# Patient Record
Sex: MALE | Race: WHITE | NOT HISPANIC OR LATINO | Employment: OTHER | URBAN - METROPOLITAN AREA
[De-identification: names, ages, dates, MRNs, and addresses within clinical notes are randomized per-mention and may not be internally consistent; named-entity substitution may affect disease eponyms.]

---

## 2023-03-30 LAB
ALANINE AMINOTRANSFERASE (SGPT) (U/L) IN SER/PLAS: 12 U/L (ref 10–52)
ALBUMIN (G/DL) IN SER/PLAS: 4.1 G/DL (ref 3.4–5)
ALKALINE PHOSPHATASE (U/L) IN SER/PLAS: 25 U/L (ref 33–136)
ANION GAP IN SER/PLAS: 12 MMOL/L (ref 10–20)
ASPARTATE AMINOTRANSFERASE (SGOT) (U/L) IN SER/PLAS: 17 U/L (ref 9–39)
BASOPHILS (10*3/UL) IN BLOOD BY AUTOMATED COUNT: 0.05 X10E9/L (ref 0–0.1)
BASOPHILS/100 LEUKOCYTES IN BLOOD BY AUTOMATED COUNT: 0.8 % (ref 0–2)
BILIRUBIN TOTAL (MG/DL) IN SER/PLAS: 0.6 MG/DL (ref 0–1.2)
CALCIDIOL (25 OH VITAMIN D3) (NG/ML) IN SER/PLAS: 64 NG/ML
CALCIUM (MG/DL) IN SER/PLAS: 9.5 MG/DL (ref 8.6–10.6)
CARBON DIOXIDE, TOTAL (MMOL/L) IN SER/PLAS: 27 MMOL/L (ref 21–32)
CHLORIDE (MMOL/L) IN SER/PLAS: 106 MMOL/L (ref 98–107)
CHOLESTEROL (MG/DL) IN SER/PLAS: 164 MG/DL (ref 0–199)
CHOLESTEROL IN HDL (MG/DL) IN SER/PLAS: 59.3 MG/DL
CHOLESTEROL/HDL RATIO: 2.8
COBALAMIN (VITAMIN B12) (PG/ML) IN SER/PLAS: 187 PG/ML (ref 211–911)
CREATININE (MG/DL) IN SER/PLAS: 1.48 MG/DL (ref 0.5–1.3)
EOSINOPHILS (10*3/UL) IN BLOOD BY AUTOMATED COUNT: 0.46 X10E9/L (ref 0–0.4)
EOSINOPHILS/100 LEUKOCYTES IN BLOOD BY AUTOMATED COUNT: 7.8 % (ref 0–6)
ERYTHROCYTE DISTRIBUTION WIDTH (RATIO) BY AUTOMATED COUNT: 13.2 % (ref 11.5–14.5)
ERYTHROCYTE MEAN CORPUSCULAR HEMOGLOBIN CONCENTRATION (G/DL) BY AUTOMATED: 32.8 G/DL (ref 32–36)
ERYTHROCYTE MEAN CORPUSCULAR VOLUME (FL) BY AUTOMATED COUNT: 95 FL (ref 80–100)
ERYTHROCYTES (10*6/UL) IN BLOOD BY AUTOMATED COUNT: 3.69 X10E12/L (ref 4.5–5.9)
GFR MALE: 44 ML/MIN/1.73M2
GLUCOSE (MG/DL) IN SER/PLAS: 94 MG/DL (ref 74–99)
HEMATOCRIT (%) IN BLOOD BY AUTOMATED COUNT: 35.1 % (ref 41–52)
HEMOGLOBIN (G/DL) IN BLOOD: 11.5 G/DL (ref 13.5–17.5)
IMMATURE GRANULOCYTES/100 LEUKOCYTES IN BLOOD BY AUTOMATED COUNT: 0.7 % (ref 0–0.9)
LDL: 91 MG/DL (ref 0–99)
LEUKOCYTES (10*3/UL) IN BLOOD BY AUTOMATED COUNT: 5.9 X10E9/L (ref 4.4–11.3)
LYMPHOCYTES (10*3/UL) IN BLOOD BY AUTOMATED COUNT: 2.32 X10E9/L (ref 0.8–3)
LYMPHOCYTES/100 LEUKOCYTES IN BLOOD BY AUTOMATED COUNT: 39.1 % (ref 13–44)
MONOCYTES (10*3/UL) IN BLOOD BY AUTOMATED COUNT: 0.42 X10E9/L (ref 0.05–0.8)
MONOCYTES/100 LEUKOCYTES IN BLOOD BY AUTOMATED COUNT: 7.1 % (ref 2–10)
NEUTROPHILS (10*3/UL) IN BLOOD BY AUTOMATED COUNT: 2.64 X10E9/L (ref 1.6–5.5)
NEUTROPHILS/100 LEUKOCYTES IN BLOOD BY AUTOMATED COUNT: 44.5 % (ref 40–80)
NRBC (PER 100 WBCS) BY AUTOMATED COUNT: 0 /100 WBC (ref 0–0)
PLATELETS (10*3/UL) IN BLOOD AUTOMATED COUNT: 213 X10E9/L (ref 150–450)
POTASSIUM (MMOL/L) IN SER/PLAS: 5.3 MMOL/L (ref 3.5–5.3)
PROTEIN TOTAL: 7.1 G/DL (ref 6.4–8.2)
SODIUM (MMOL/L) IN SER/PLAS: 140 MMOL/L (ref 136–145)
TRIGLYCERIDE (MG/DL) IN SER/PLAS: 67 MG/DL (ref 0–149)
UREA NITROGEN (MG/DL) IN SER/PLAS: 26 MG/DL (ref 6–23)
VLDL: 13 MG/DL (ref 0–40)

## 2023-11-01 DIAGNOSIS — F41.9 ANXIETY: Primary | ICD-10-CM

## 2023-11-01 RX ORDER — SERTRALINE HYDROCHLORIDE 50 MG/1
50 TABLET, FILM COATED ORAL NIGHTLY
Qty: 30 TABLET | Refills: 1 | Status: SHIPPED | OUTPATIENT
Start: 2023-11-01 | End: 2023-12-06

## 2023-11-04 PROBLEM — F32.A DEPRESSION: Status: ACTIVE | Noted: 2023-11-04

## 2023-11-04 PROBLEM — M54.9 BACK PAIN, CHRONIC: Status: ACTIVE | Noted: 2023-11-04

## 2023-11-04 PROBLEM — I35.8 HEART MURMUR, AORTIC: Status: ACTIVE | Noted: 2023-11-04

## 2023-11-04 PROBLEM — G89.29 BACK PAIN, CHRONIC: Status: ACTIVE | Noted: 2023-11-04

## 2023-11-04 PROBLEM — G62.9 PERIPHERAL NEUROPATHY: Status: ACTIVE | Noted: 2023-11-04

## 2023-11-04 PROBLEM — R94.4 ABNORMAL RENAL FUNCTION TEST: Status: ACTIVE | Noted: 2023-11-04

## 2023-11-04 PROBLEM — K59.09 CHRONIC CONSTIPATION: Status: ACTIVE | Noted: 2023-11-04

## 2023-11-04 PROBLEM — I35.1 AORTIC VALVE REGURGITATION, NONRHEUMATIC: Status: ACTIVE | Noted: 2023-11-04

## 2023-11-04 PROBLEM — E78.5 HYPERLIPIDEMIA: Status: ACTIVE | Noted: 2023-11-04

## 2023-11-04 PROBLEM — E87.6 HYPOKALEMIA: Status: ACTIVE | Noted: 2023-11-04

## 2023-11-04 PROBLEM — I35.0 AORTIC STENOSIS: Status: ACTIVE | Noted: 2023-11-04

## 2023-11-04 PROBLEM — I10 HYPERTENSION: Status: ACTIVE | Noted: 2023-11-04

## 2023-11-04 PROBLEM — N45.1 EPIDIDYMITIS, RIGHT: Status: ACTIVE | Noted: 2023-11-04

## 2023-11-04 PROBLEM — C46.9: Status: ACTIVE | Noted: 2023-11-04

## 2023-11-04 PROBLEM — N28.1 SIMPLE RENAL CYST: Status: ACTIVE | Noted: 2023-11-04

## 2023-11-04 PROBLEM — R53.83 CAREGIVER WITH FATIGUE: Status: ACTIVE | Noted: 2023-11-04

## 2023-11-04 PROBLEM — D64.9 ANEMIA: Status: ACTIVE | Noted: 2023-11-04

## 2023-11-04 PROBLEM — R73.09 ABNORMAL GLUCOSE: Status: ACTIVE | Noted: 2023-11-04

## 2023-11-04 PROBLEM — N18.30 CHRONIC KIDNEY DISEASE (CKD), STAGE III (MODERATE) (MULTI): Status: ACTIVE | Noted: 2023-11-04

## 2023-11-04 PROBLEM — R79.89 ABNORMAL TSH: Status: ACTIVE | Noted: 2023-11-04

## 2023-11-04 PROBLEM — B37.2 MONILIASIS SKIN: Status: ACTIVE | Noted: 2023-11-04

## 2023-11-04 PROBLEM — N52.9 ERECTILE DYSFUNCTION: Status: ACTIVE | Noted: 2023-11-04

## 2023-11-04 PROBLEM — R79.89 LOW VITAMIN D LEVEL: Status: ACTIVE | Noted: 2023-11-04

## 2023-11-04 PROBLEM — M48.061 SPINAL STENOSIS, LUMBAR: Status: ACTIVE | Noted: 2023-11-04

## 2023-11-04 RX ORDER — LISINOPRIL 20 MG/1
1 TABLET ORAL EVERY MORNING
COMMUNITY
Start: 2023-08-06

## 2023-11-04 RX ORDER — POTASSIUM CHLORIDE 1500 MG/1
1 TABLET, EXTENDED RELEASE ORAL DAILY
COMMUNITY
End: 2024-03-12

## 2023-11-04 RX ORDER — METOPROLOL SUCCINATE 50 MG/1
1 TABLET, EXTENDED RELEASE ORAL DAILY
COMMUNITY
End: 2023-11-10

## 2023-11-04 RX ORDER — NYSTATIN 100000 U/G
1 CREAM TOPICAL EVERY 12 HOURS
COMMUNITY
End: 2023-11-06 | Stop reason: ALTCHOICE

## 2023-11-04 RX ORDER — NORTRIPTYLINE HYDROCHLORIDE 10 MG/1
1 CAPSULE ORAL NIGHTLY
COMMUNITY
End: 2024-01-02

## 2023-11-04 RX ORDER — QUINAPRIL 20 MG/1
1 TABLET ORAL DAILY
COMMUNITY
Start: 2013-12-09 | End: 2023-11-06 | Stop reason: ALTCHOICE

## 2023-11-04 RX ORDER — CHOLECALCIFEROL (VITAMIN D3) 125 MCG
1 CAPSULE ORAL DAILY
COMMUNITY
Start: 2017-06-08

## 2023-11-04 RX ORDER — SIMVASTATIN 20 MG/1
1 TABLET, FILM COATED ORAL NIGHTLY
COMMUNITY
End: 2023-12-14

## 2023-11-04 RX ORDER — AMLODIPINE BESYLATE 10 MG/1
1 TABLET ORAL DAILY
COMMUNITY

## 2023-11-04 RX ORDER — ASPIRIN 81 MG/1
1 TABLET ORAL DAILY
COMMUNITY
Start: 2017-02-06

## 2023-11-06 ENCOUNTER — OFFICE VISIT (OUTPATIENT)
Dept: PRIMARY CARE | Facility: CLINIC | Age: 88
End: 2023-11-06
Payer: MEDICARE

## 2023-11-06 VITALS
HEART RATE: 67 BPM | BODY MASS INDEX: 26.91 KG/M2 | HEIGHT: 64 IN | WEIGHT: 157.63 LBS | DIASTOLIC BLOOD PRESSURE: 54 MMHG | SYSTOLIC BLOOD PRESSURE: 121 MMHG | OXYGEN SATURATION: 99 % | RESPIRATION RATE: 16 BRPM

## 2023-11-06 DIAGNOSIS — Z00.00 MEDICARE ANNUAL WELLNESS VISIT, SUBSEQUENT: Primary | ICD-10-CM

## 2023-11-06 DIAGNOSIS — Z23 NEED FOR IMMUNIZATION AGAINST INFLUENZA: ICD-10-CM

## 2023-11-06 DIAGNOSIS — Z23 NEED FOR VACCINATION: ICD-10-CM

## 2023-11-06 DIAGNOSIS — E78.00 PURE HYPERCHOLESTEROLEMIA: ICD-10-CM

## 2023-11-06 DIAGNOSIS — I10 PRIMARY HYPERTENSION: ICD-10-CM

## 2023-11-06 DIAGNOSIS — E53.8 VITAMIN B12 DEFICIENCY: ICD-10-CM

## 2023-11-06 PROBLEM — I70.91 GENERALIZED ATHEROSCLEROSIS: Status: ACTIVE | Noted: 2023-08-21

## 2023-11-06 PROBLEM — R53.83 CAREGIVER WITH FATIGUE: Status: RESOLVED | Noted: 2023-11-04 | Resolved: 2023-11-06

## 2023-11-06 PROBLEM — R73.09 ABNORMAL GLUCOSE: Status: RESOLVED | Noted: 2023-11-04 | Resolved: 2023-11-06

## 2023-11-06 PROBLEM — R79.89 ABNORMAL TSH: Status: RESOLVED | Noted: 2023-11-04 | Resolved: 2023-11-06

## 2023-11-06 PROBLEM — M54.9 BACK PAIN, CHRONIC: Status: RESOLVED | Noted: 2023-11-04 | Resolved: 2023-11-06

## 2023-11-06 PROBLEM — R60.0 EDEMA OF BOTH LOWER LEGS: Status: RESOLVED | Noted: 2023-08-21 | Resolved: 2023-11-06

## 2023-11-06 PROBLEM — G89.29 BACK PAIN, CHRONIC: Status: RESOLVED | Noted: 2023-11-04 | Resolved: 2023-11-06

## 2023-11-06 PROBLEM — K59.09 CHRONIC CONSTIPATION: Status: RESOLVED | Noted: 2023-11-04 | Resolved: 2023-11-06

## 2023-11-06 PROBLEM — R94.4 ABNORMAL RENAL FUNCTION TEST: Status: RESOLVED | Noted: 2023-11-04 | Resolved: 2023-11-06

## 2023-11-06 PROBLEM — B37.2 MONILIASIS SKIN: Status: RESOLVED | Noted: 2023-11-04 | Resolved: 2023-11-06

## 2023-11-06 PROBLEM — B35.1 ONYCHOMYCOSIS: Status: RESOLVED | Noted: 2023-08-21 | Resolved: 2023-11-06

## 2023-11-06 PROBLEM — I73.9 PVD (PERIPHERAL VASCULAR DISEASE) (CMS-HCC): Status: ACTIVE | Noted: 2023-08-21

## 2023-11-06 PROBLEM — L84 PRE-ULCERATIVE CALLUSES: Status: RESOLVED | Noted: 2023-08-21 | Resolved: 2023-11-06

## 2023-11-06 PROCEDURE — 90677 PCV20 VACCINE IM: CPT | Performed by: INTERNAL MEDICINE

## 2023-11-06 PROCEDURE — 1170F FXNL STATUS ASSESSED: CPT | Performed by: INTERNAL MEDICINE

## 2023-11-06 PROCEDURE — 96372 THER/PROPH/DIAG INJ SC/IM: CPT | Performed by: INTERNAL MEDICINE

## 2023-11-06 PROCEDURE — 1126F AMNT PAIN NOTED NONE PRSNT: CPT | Performed by: INTERNAL MEDICINE

## 2023-11-06 PROCEDURE — 99213 OFFICE O/P EST LOW 20 MIN: CPT | Performed by: INTERNAL MEDICINE

## 2023-11-06 PROCEDURE — 3074F SYST BP LT 130 MM HG: CPT | Performed by: INTERNAL MEDICINE

## 2023-11-06 PROCEDURE — 1159F MED LIST DOCD IN RCRD: CPT | Performed by: INTERNAL MEDICINE

## 2023-11-06 PROCEDURE — 90662 IIV NO PRSV INCREASED AG IM: CPT | Performed by: INTERNAL MEDICINE

## 2023-11-06 PROCEDURE — 3078F DIAST BP <80 MM HG: CPT | Performed by: INTERNAL MEDICINE

## 2023-11-06 PROCEDURE — G0008 ADMIN INFLUENZA VIRUS VAC: HCPCS | Performed by: INTERNAL MEDICINE

## 2023-11-06 PROCEDURE — 1160F RVW MEDS BY RX/DR IN RCRD: CPT | Performed by: INTERNAL MEDICINE

## 2023-11-06 PROCEDURE — G0009 ADMIN PNEUMOCOCCAL VACCINE: HCPCS | Performed by: INTERNAL MEDICINE

## 2023-11-06 PROCEDURE — G0439 PPPS, SUBSEQ VISIT: HCPCS | Performed by: INTERNAL MEDICINE

## 2023-11-06 RX ORDER — CYANOCOBALAMIN 1000 UG/ML
1000 INJECTION, SOLUTION INTRAMUSCULAR; SUBCUTANEOUS ONCE
Status: COMPLETED | OUTPATIENT
Start: 2023-11-06 | End: 2023-11-06

## 2023-11-06 RX ADMIN — CYANOCOBALAMIN 1000 MCG: 1000 INJECTION, SOLUTION INTRAMUSCULAR; SUBCUTANEOUS at 15:00

## 2023-11-06 ASSESSMENT — ENCOUNTER SYMPTOMS
SPEECH DIFFICULTY: 0
DYSURIA: 0
HEMATURIA: 0
WEAKNESS: 0
POLYPHAGIA: 0
APPETITE CHANGE: 0
BACK PAIN: 0
FLANK PAIN: 0
PALPITATIONS: 0
HEADACHES: 0
BRUISES/BLEEDS EASILY: 0
DIZZINESS: 0
PHOTOPHOBIA: 0
DIAPHORESIS: 0
NUMBNESS: 0
ARTHRALGIAS: 0
STRIDOR: 0
COUGH: 0
OCCASIONAL FEELINGS OF UNSTEADINESS: 0
DEPRESSION: 0
CHILLS: 0
TREMORS: 0
ADENOPATHY: 0
SEIZURES: 0
FATIGUE: 0
LOSS OF SENSATION IN FEET: 0

## 2023-11-06 ASSESSMENT — ACTIVITIES OF DAILY LIVING (ADL)
BATHING: INDEPENDENT
TAKING_MEDICATION: INDEPENDENT
GROCERY_SHOPPING: TOTAL CARE
DRESSING: INDEPENDENT
MANAGING_FINANCES: INDEPENDENT
DOING_HOUSEWORK: INDEPENDENT

## 2023-11-06 ASSESSMENT — PATIENT HEALTH QUESTIONNAIRE - PHQ9
SUM OF ALL RESPONSES TO PHQ9 QUESTIONS 1 AND 2: 0
2. FEELING DOWN, DEPRESSED OR HOPELESS: NOT AT ALL
1. LITTLE INTEREST OR PLEASURE IN DOING THINGS: NOT AT ALL

## 2023-11-06 ASSESSMENT — PAIN SCALES - GENERAL: PAINLEVEL: 0-NO PAIN

## 2023-11-06 NOTE — ASSESSMENT & PLAN NOTE
No recent hospitalizations.    All medications reviewed and reconciled by me the provider..  No use of controlled substances or opiates.    Family history, social history reviewed, no changes.    Patient does not smoke.    Patient does not drink.    Patient hydrates adequately daily.  Eats a well-balanced healthy diet.     Exercises adequately including walking and doing weightbearing exercises.    Patient denies any difficulty with memory or cognition.     Denies any difficulty with hearing.  Patient does not wear hearing aids.    No fall risk.  No recent falls.  Denies any difficulty walking.    Patient has  history of depression , denies any loss of interest, no feeling of sadness, no lack of motivation.    Patient is independent in all ADLs and IADLs.  Independent bathing, dressing, walking.  Takes care of own finances, shopping and cooking. Dependent on transportation.    End-of-life decision-making power of  reviewed with patient.  Received high-dose influenza vaccine and Prevnar 20 vaccine today.  Have fasting blood work.  Continue same medications.  Follow up in 6 months.

## 2023-11-06 NOTE — ASSESSMENT & PLAN NOTE
Hypertension : controlled blood pressure and continues same medications and educate a low salt diet do not exceed 200 mg per serving. Keep monitor the blood pressure at home. Refill medications

## 2023-11-06 NOTE — PROGRESS NOTES
"Subjective   Patient ID: Reid Cano is a 89 y.o. male who presents for Medicare Annual Wellness Visit Subsequent.    Medicare wellness visit.  He is in the room with his daughter.  He is dependent on transportation.  She lives alone and manages ADLs by himself.  Has no complaints today.  He has hypertension hypercholesterolemia, aortic stenosis, kyphosis,, history of depression.  He is compliant taking his medications.         Review of Systems   Constitutional:  Negative for appetite change, chills, diaphoresis and fatigue.   HENT:  Negative for congestion, ear discharge, hearing loss, mouth sores and postnasal drip.    Eyes:  Negative for photophobia and visual disturbance.   Respiratory:  Negative for cough and stridor.    Cardiovascular:  Negative for palpitations and leg swelling.   Endocrine: Negative for cold intolerance, heat intolerance, polyphagia and polyuria.   Genitourinary:  Negative for decreased urine volume, dysuria, enuresis, flank pain and hematuria.   Musculoskeletal:  Negative for arthralgias, back pain and gait problem.   Allergic/Immunologic: Negative for food allergies and immunocompromised state.   Neurological:  Negative for dizziness, tremors, seizures, syncope, speech difficulty, weakness, numbness and headaches.   Hematological:  Negative for adenopathy. Does not bruise/bleed easily.       Objective   /54 (BP Location: Left arm, Patient Position: Sitting)   Pulse 67   Resp 16   Ht 1.615 m (5' 3.58\")   Wt 71.5 kg (157 lb 10.1 oz)   SpO2 99%   BMI 27.41 kg/m²     Physical Exam  Vitals reviewed.   Constitutional:       Appearance: Normal appearance.   HENT:      Head: Normocephalic and atraumatic.      Right Ear: External ear normal.      Left Ear: External ear normal.      Mouth/Throat:      Mouth: Mucous membranes are moist.      Pharynx: Oropharynx is clear.   Neck:      Vascular: No carotid bruit.   Cardiovascular:      Rate and Rhythm: Normal rate and regular rhythm.      " Heart sounds: Murmur heard.      No gallop.      Comments: Systolic murmur.  Pulmonary:      Effort: Pulmonary effort is normal. No respiratory distress.      Breath sounds: No wheezing or rales.   Abdominal:      General: Abdomen is flat.      Palpations: Abdomen is soft.      Hernia: No hernia is present.   Musculoskeletal:         General: No swelling, tenderness, deformity or signs of injury.      Cervical back: No rigidity or tenderness.      Right lower leg: No edema.   Lymphadenopathy:      Cervical: No cervical adenopathy.   Skin:     Coloration: Skin is not jaundiced or pale.      Findings: No bruising, erythema, lesion or rash.      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      Mental Status: He is oriented to person, place, and time.      Motor: No weakness.      Coordination: Coordination normal.   Psychiatric:         Mood and Affect: Mood normal.         Behavior: Behavior normal.         Assessment/Plan   Problem List Items Addressed This Visit             ICD-10-CM    Hyperlipidemia E78.5     Hypercholesterolemia: check lipid profile.   Educate low cholesterol diet , avoid fast foods , processed meats and fried foods, red meat.  Increase physical activity.  Keep a low carb diet.             Relevant Orders    Lipid Panel    Hypertension I10     Hypertension : controlled blood pressure and continues same medications and educate a low salt diet do not exceed 200 mg per serving. Keep monitor the blood pressure at home. Refill medications            Relevant Orders    Comprehensive Metabolic Panel    Medicare annual wellness visit, subsequent - Primary Z00.00     No recent hospitalizations.    All medications reviewed and reconciled by me the provider..  No use of controlled substances or opiates.    Family history, social history reviewed, no changes.    Patient does not smoke.    Patient does not drink.    Patient hydrates  adequately daily.  Eats a well-balanced healthy diet.     Exercises adequately including walking and doing weightbearing exercises.    Patient denies any difficulty with memory or cognition.     Denies any difficulty with hearing.  Patient does not wear hearing aids.    No fall risk.  No recent falls.  Denies any difficulty walking.    Patient has  history of depression , denies any loss of interest, no feeling of sadness, no lack of motivation.    Patient is independent in all ADLs and IADLs.  Independent bathing, dressing, walking.  Takes care of own finances, shopping and cooking. Dependent on transportation.    End-of-life decision-making power of  reviewed with patient.  Received high-dose influenza vaccine and Prevnar 20 vaccine today.  Have fasting blood work.  Continue same medications.  Follow up in 6 months.         Vitamin B12 deficiency E53.8     Received Vit B12 injection today.         Relevant Medications    cyanocobalamin (Vitamin B-12) injection 1,000 mcg (Completed)     Other Visit Diagnoses         Codes    Need for immunization against influenza     Z23    Relevant Orders    Flu vaccine, quadrivalent, high-dose, preservative free, age 65y+ (FLUZONE) (Completed)    Need for vaccination     Z23    Relevant Orders    Pneumococcal conjugate vaccine, 20-valent (PREVNAR 20) (Completed)

## 2023-11-10 DIAGNOSIS — I10 ESSENTIAL HYPERTENSION, BENIGN: ICD-10-CM

## 2023-11-10 DIAGNOSIS — D51.9 ANEMIA DUE TO VITAMIN B12 DEFICIENCY, UNSPECIFIED B12 DEFICIENCY TYPE: ICD-10-CM

## 2023-11-10 RX ORDER — METOPROLOL SUCCINATE 50 MG/1
50 TABLET, EXTENDED RELEASE ORAL DAILY
Qty: 90 TABLET | Refills: 1 | Status: SHIPPED | OUTPATIENT
Start: 2023-11-10 | End: 2024-03-29 | Stop reason: SDUPTHER

## 2023-11-20 ENCOUNTER — TELEPHONE (OUTPATIENT)
Dept: PRIMARY CARE | Facility: CLINIC | Age: 88
End: 2023-11-20
Payer: MEDICARE

## 2023-12-06 DIAGNOSIS — F41.9 ANXIETY: ICD-10-CM

## 2023-12-06 RX ORDER — SERTRALINE HYDROCHLORIDE 50 MG/1
50 TABLET, FILM COATED ORAL NIGHTLY
Qty: 30 TABLET | Refills: 1 | Status: SHIPPED | OUTPATIENT
Start: 2023-12-06

## 2023-12-13 DIAGNOSIS — E78.5 HYPERLIPIDEMIA, UNSPECIFIED HYPERLIPIDEMIA TYPE: ICD-10-CM

## 2023-12-14 RX ORDER — SIMVASTATIN 20 MG/1
20 TABLET, FILM COATED ORAL NIGHTLY
Qty: 90 TABLET | Refills: 0 | Status: SHIPPED | OUTPATIENT
Start: 2023-12-14 | End: 2024-03-18

## 2024-01-01 DIAGNOSIS — F32.A DEPRESSION, UNSPECIFIED DEPRESSION TYPE: ICD-10-CM

## 2024-01-02 RX ORDER — NORTRIPTYLINE HYDROCHLORIDE 10 MG/1
10 CAPSULE ORAL NIGHTLY
Qty: 90 CAPSULE | Refills: 0 | Status: SHIPPED | OUTPATIENT
Start: 2024-01-02

## 2024-02-23 ENCOUNTER — TELEPHONE (OUTPATIENT)
Dept: PRIMARY CARE | Facility: CLINIC | Age: 89
End: 2024-02-23
Payer: MEDICARE

## 2024-02-26 RX ORDER — TIZANIDINE 2 MG/1
2 TABLET ORAL EVERY 6 HOURS PRN
COMMUNITY
Start: 2024-01-04

## 2024-02-26 RX ORDER — DOCUSATE SODIUM 100 MG/1
100 CAPSULE, LIQUID FILLED ORAL 2 TIMES DAILY
COMMUNITY
Start: 2024-01-04

## 2024-02-26 RX ORDER — SENNOSIDES 8.6 MG/1
8.6 TABLET ORAL 2 TIMES DAILY
COMMUNITY
Start: 2024-01-04

## 2024-03-05 ENCOUNTER — OFFICE VISIT (OUTPATIENT)
Dept: PRIMARY CARE | Facility: CLINIC | Age: 89
End: 2024-03-05
Payer: MEDICARE

## 2024-03-05 VITALS
HEART RATE: 83 BPM | DIASTOLIC BLOOD PRESSURE: 55 MMHG | SYSTOLIC BLOOD PRESSURE: 121 MMHG | RESPIRATION RATE: 16 BRPM | TEMPERATURE: 97.3 F | OXYGEN SATURATION: 99 %

## 2024-03-05 DIAGNOSIS — N18.32 STAGE 3B CHRONIC KIDNEY DISEASE (MULTI): ICD-10-CM

## 2024-03-05 DIAGNOSIS — E78.01 FAMILIAL HYPERCHOLESTEROLEMIA: ICD-10-CM

## 2024-03-05 DIAGNOSIS — E53.8 VITAMIN B12 DEFICIENCY: ICD-10-CM

## 2024-03-05 DIAGNOSIS — S14.0XXA: ICD-10-CM

## 2024-03-05 DIAGNOSIS — I10 ESSENTIAL HYPERTENSION, BENIGN: Primary | ICD-10-CM

## 2024-03-05 PROBLEM — I65.21 OCCLUSION AND STENOSIS OF RIGHT CAROTID ARTERY: Status: ACTIVE | Noted: 2024-01-26

## 2024-03-05 PROBLEM — Z00.00 MEDICARE ANNUAL WELLNESS VISIT, SUBSEQUENT: Status: RESOLVED | Noted: 2023-11-06 | Resolved: 2024-03-05

## 2024-03-05 PROBLEM — R13.10 DYSPHAGIA: Status: RESOLVED | Noted: 2024-01-26 | Resolved: 2024-03-05

## 2024-03-05 PROBLEM — S12.301A: Status: RESOLVED | Noted: 2024-01-26 | Resolved: 2024-03-05

## 2024-03-05 PROBLEM — N17.9 AKI (ACUTE KIDNEY INJURY) (CMS-HCC): Status: RESOLVED | Noted: 2024-01-02 | Resolved: 2024-03-05

## 2024-03-05 PROBLEM — T14.8XXA MULTIPLE SKIN TEARS: Status: RESOLVED | Noted: 2024-01-02 | Resolved: 2024-03-05

## 2024-03-05 PROBLEM — I65.22 OCCLUSION AND STENOSIS OF LEFT CAROTID ARTERY: Status: ACTIVE | Noted: 2024-01-26
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
## 2024-03-05 PROBLEM — S12.001A: Status: RESOLVED | Noted: 2024-01-26 | Resolved: 2024-03-05

## 2024-03-05 PROBLEM — S12.100A: Status: RESOLVED | Noted: 2024-01-01 | Resolved: 2024-03-05

## 2024-03-05 PROCEDURE — 99214 OFFICE O/P EST MOD 30 MIN: CPT | Performed by: INTERNAL MEDICINE

## 2024-03-05 PROCEDURE — 1159F MED LIST DOCD IN RCRD: CPT | Performed by: INTERNAL MEDICINE

## 2024-03-05 PROCEDURE — 3078F DIAST BP <80 MM HG: CPT | Performed by: INTERNAL MEDICINE

## 2024-03-05 PROCEDURE — 1126F AMNT PAIN NOTED NONE PRSNT: CPT | Performed by: INTERNAL MEDICINE

## 2024-03-05 PROCEDURE — 96372 THER/PROPH/DIAG INJ SC/IM: CPT | Performed by: INTERNAL MEDICINE

## 2024-03-05 PROCEDURE — 3074F SYST BP LT 130 MM HG: CPT | Performed by: INTERNAL MEDICINE

## 2024-03-05 PROCEDURE — 1160F RVW MEDS BY RX/DR IN RCRD: CPT | Performed by: INTERNAL MEDICINE

## 2024-03-05 RX ORDER — BACITRACIN 500 [USP'U]/G
OINTMENT TOPICAL
COMMUNITY
Start: 2024-01-17

## 2024-03-05 RX ORDER — CYANOCOBALAMIN 1000 UG/ML
1000 INJECTION, SOLUTION INTRAMUSCULAR; SUBCUTANEOUS ONCE
Status: COMPLETED | OUTPATIENT
Start: 2024-03-05 | End: 2024-03-05

## 2024-03-05 RX ADMIN — CYANOCOBALAMIN 1000 MCG: 1000 INJECTION, SOLUTION INTRAMUSCULAR; SUBCUTANEOUS at 15:31

## 2024-03-05 ASSESSMENT — ENCOUNTER SYMPTOMS
SPEECH DIFFICULTY: 0
NUMBNESS: 0
STRIDOR: 0
HEADACHES: 0
TREMORS: 0
PALPITATIONS: 0
HEMATURIA: 0
APPETITE CHANGE: 0
NERVOUS/ANXIOUS: 1
BRUISES/BLEEDS EASILY: 0
WEAKNESS: 0
ADENOPATHY: 0
FATIGUE: 0
POLYPHAGIA: 0
FLANK PAIN: 0
BACK PAIN: 0
SEIZURES: 0
ARTHRALGIAS: 0
DYSURIA: 0
DIZZINESS: 0
CHILLS: 0
DIAPHORESIS: 0
COUGH: 0
PHOTOPHOBIA: 0

## 2024-03-05 ASSESSMENT — PAIN SCALES - GENERAL: PAINLEVEL: 0-NO PAIN

## 2024-03-05 NOTE — ASSESSMENT & PLAN NOTE
Hypercholesterolemia: reviewed lipid profile.   Educate low cholesterol diet , avoid fast foods , processed meats and fried foods, red meat.  Increase physical activity.  Keep a low carb diet.

## 2024-03-05 NOTE — PROGRESS NOTES
Subjective   Patient ID: Reid Cano is a 89 y.o. male who presents for Anxiety.    Follow-up visit.  He is in the room with his daughter who gives most of the history.  December 31 he sustained a fall coming in the house from the garage, next day daughter took him to [facility] emergency room.  Has had CT scan of the cervical spine MRI showed multiple vertebral fractures, moderate to severe spinal canal stenosis cord compression C3-C4, odontoid fracture.  He wears a neck brace.  He has 24 hours care at home.  Denies pain.  He has hypertension, hypercholesterolemia history of Kaposi's sarcoma depression with anxiety.     Anxiety  Symptoms include nervous/anxious behavior. Patient reports no dizziness or palpitations.            Review of Systems   Constitutional:  Negative for appetite change, chills, diaphoresis and fatigue.   HENT:  Negative for congestion, ear discharge, hearing loss, mouth sores and postnasal drip.    Eyes:  Negative for photophobia and visual disturbance.   Respiratory:  Negative for cough and stridor.    Cardiovascular:  Negative for palpitations and leg swelling.   Endocrine: Negative for cold intolerance, heat intolerance, polyphagia and polyuria.   Genitourinary:  Negative for decreased urine volume, dysuria, enuresis, flank pain and hematuria.   Musculoskeletal:  Negative for arthralgias, back pain and gait problem.   Allergic/Immunologic: Negative for food allergies and immunocompromised state.   Neurological:  Negative for dizziness, tremors, seizures, syncope, speech difficulty, weakness, numbness and headaches.   Hematological:  Negative for adenopathy. Does not bruise/bleed easily.   Psychiatric/Behavioral:  The patient is nervous/anxious.    All other systems reviewed and are negative.      Objective   /55 (BP Location: Left arm, Patient Position: Sitting)   Pulse 83   Temp 36.3 °C (97.3 °F) (Temporal)   Resp 16   SpO2 99%     Physical Exam  Constitutional:        Appearance: Normal appearance. He is ill-appearing.   HENT:      Head: Normocephalic and atraumatic.      Right Ear: External ear normal.      Left Ear: External ear normal.      Mouth/Throat:      Mouth: Mucous membranes are moist.      Pharynx: Oropharynx is clear.   Neck:      Vascular: No carotid bruit.      Comments: Neck  brace.  Cardiovascular:      Rate and Rhythm: Normal rate and regular rhythm.      Heart sounds: No murmur heard.     No gallop.   Pulmonary:      Effort: Pulmonary effort is normal. No respiratory distress.      Breath sounds: No wheezing or rales.   Abdominal:      General: Abdomen is flat.      Palpations: Abdomen is soft.      Hernia: No hernia is present.   Musculoskeletal:         General: No swelling, tenderness, deformity or signs of injury.      Cervical back: No rigidity or tenderness.      Right lower leg: No edema.   Lymphadenopathy:      Cervical: No cervical adenopathy.   Skin:     Coloration: Skin is not jaundiced or pale.      Findings: No bruising, erythema, lesion or rash.      Comments: Kaposi  sarcoma skin changes lower extremities,   Neurological:      General: No focal deficit present.      Mental Status: He is oriented to person, place, and time.      Motor: No weakness.      Coordination: Coordination normal.   Psychiatric:         Mood and Affect: Mood normal.         Behavior: Behavior normal.         Assessment/Plan   Problem List Items Addressed This Visit             ICD-10-CM    Chronic kidney disease (CKD), stage III (moderate) (CMS/HCC) N18.30     Last GFR 43.  Avoid dehydration and NSAIDs.         Hyperlipidemia E78.5     Hypercholesterolemia: reviewed lipid profile.   Educate low cholesterol diet , avoid fast foods , processed meats and fried foods, red meat.  Increase physical activity.  Keep a low carb diet.             Essential hypertension, benign - Primary I10     Hypertension : controlled blood pressure and continues same medications and educate a low  salt diet do not exceed 200 mg per serving. Keep monitor the blood pressure at home.            Vitamin B12 deficiency E53.8     Received vitamin B12 injection today.         Relevant Medications    cyanocobalamin (Vitamin B-12) injection 1,000 mcg (Completed)    Traumatic edema of cervical spinal cord (CMS/HCC) S14.0XXA     Odontoid fracture.  Follows up with neurologist.  Wears a neck brace

## 2024-03-12 DIAGNOSIS — E87.6 HYPOKALEMIA: Primary | ICD-10-CM

## 2024-03-12 RX ORDER — POTASSIUM CHLORIDE 1500 MG/1
20 TABLET, EXTENDED RELEASE ORAL DAILY
Qty: 90 TABLET | Refills: 1 | Status: SHIPPED | OUTPATIENT
Start: 2024-03-12

## 2024-03-17 DIAGNOSIS — E78.5 HYPERLIPIDEMIA, UNSPECIFIED HYPERLIPIDEMIA TYPE: ICD-10-CM

## 2024-03-18 RX ORDER — SIMVASTATIN 20 MG/1
20 TABLET, FILM COATED ORAL NIGHTLY
Qty: 90 TABLET | Refills: 1 | Status: SHIPPED | OUTPATIENT
Start: 2024-03-18

## 2024-03-29 DIAGNOSIS — I10 ESSENTIAL HYPERTENSION, BENIGN: ICD-10-CM

## 2024-03-29 RX ORDER — METOPROLOL SUCCINATE 50 MG/1
50 TABLET, EXTENDED RELEASE ORAL DAILY
Qty: 90 TABLET | Refills: 1 | Status: SHIPPED | OUTPATIENT
Start: 2024-03-29

## 2024-06-04 ENCOUNTER — APPOINTMENT (OUTPATIENT)
Dept: PRIMARY CARE | Facility: CLINIC | Age: 89
End: 2024-06-04
Payer: MEDICARE

## 2024-07-02 ENCOUNTER — APPOINTMENT (OUTPATIENT)
Dept: PRIMARY CARE | Facility: CLINIC | Age: 89
End: 2024-07-02
Payer: MEDICARE

## 2024-07-25 ENCOUNTER — APPOINTMENT (OUTPATIENT)
Dept: PRIMARY CARE | Facility: CLINIC | Age: 89
End: 2024-07-25
Payer: MEDICARE

## 2024-07-29 ENCOUNTER — LAB (OUTPATIENT)
Dept: LAB | Facility: LAB | Age: 89
End: 2024-07-29
Payer: MEDICARE

## 2024-07-29 ENCOUNTER — APPOINTMENT (OUTPATIENT)
Dept: PRIMARY CARE | Facility: CLINIC | Age: 89
End: 2024-07-29
Payer: MEDICARE

## 2024-07-29 VITALS
RESPIRATION RATE: 16 BRPM | SYSTOLIC BLOOD PRESSURE: 130 MMHG | DIASTOLIC BLOOD PRESSURE: 75 MMHG | BODY MASS INDEX: 26.65 KG/M2 | OXYGEN SATURATION: 97 % | TEMPERATURE: 97.5 F | HEART RATE: 54 BPM | WEIGHT: 153.22 LBS

## 2024-07-29 DIAGNOSIS — D63.1 ANEMIA DUE TO STAGE 3B CHRONIC KIDNEY DISEASE (MULTI): ICD-10-CM

## 2024-07-29 DIAGNOSIS — E53.8 VITAMIN B12 DEFICIENCY: ICD-10-CM

## 2024-07-29 DIAGNOSIS — I10 ESSENTIAL HYPERTENSION, BENIGN: ICD-10-CM

## 2024-07-29 DIAGNOSIS — N18.32 ANEMIA DUE TO STAGE 3B CHRONIC KIDNEY DISEASE (MULTI): ICD-10-CM

## 2024-07-29 DIAGNOSIS — N18.32 STAGE 3B CHRONIC KIDNEY DISEASE (MULTI): ICD-10-CM

## 2024-07-29 DIAGNOSIS — R60.0 LEG EDEMA, RIGHT: Primary | ICD-10-CM

## 2024-07-29 LAB
ALBUMIN SERPL BCP-MCNC: 4.3 G/DL (ref 3.4–5)
ALP SERPL-CCNC: 25 U/L (ref 33–136)
ALT SERPL W P-5'-P-CCNC: 9 U/L (ref 10–52)
ANION GAP SERPL CALC-SCNC: 14 MMOL/L (ref 10–20)
AST SERPL W P-5'-P-CCNC: 14 U/L (ref 9–39)
BASOPHILS # BLD AUTO: 0.06 X10*3/UL (ref 0–0.1)
BASOPHILS NFR BLD AUTO: 0.9 %
BILIRUB SERPL-MCNC: 0.5 MG/DL (ref 0–1.2)
BUN SERPL-MCNC: 31 MG/DL (ref 6–23)
CALCIUM SERPL-MCNC: 9.6 MG/DL (ref 8.6–10.6)
CHLORIDE SERPL-SCNC: 105 MMOL/L (ref 98–107)
CO2 SERPL-SCNC: 24 MMOL/L (ref 21–32)
CREAT SERPL-MCNC: 1.75 MG/DL (ref 0.5–1.3)
EGFRCR SERPLBLD CKD-EPI 2021: 35 ML/MIN/1.73M*2
EOSINOPHIL # BLD AUTO: 0.37 X10*3/UL (ref 0–0.4)
EOSINOPHIL NFR BLD AUTO: 5.7 %
ERYTHROCYTE [DISTWIDTH] IN BLOOD BY AUTOMATED COUNT: 13.3 % (ref 11.5–14.5)
GLUCOSE SERPL-MCNC: 78 MG/DL (ref 74–99)
HCT VFR BLD AUTO: 35 % (ref 41–52)
HGB BLD-MCNC: 11.6 G/DL (ref 13.5–17.5)
IMM GRANULOCYTES # BLD AUTO: 0.02 X10*3/UL (ref 0–0.5)
IMM GRANULOCYTES NFR BLD AUTO: 0.3 % (ref 0–0.9)
LYMPHOCYTES # BLD AUTO: 3.22 X10*3/UL (ref 0.8–3)
LYMPHOCYTES NFR BLD AUTO: 49.9 %
MCH RBC QN AUTO: 31.4 PG (ref 26–34)
MCHC RBC AUTO-ENTMCNC: 33.1 G/DL (ref 32–36)
MCV RBC AUTO: 95 FL (ref 80–100)
MONOCYTES # BLD AUTO: 0.4 X10*3/UL (ref 0.05–0.8)
MONOCYTES NFR BLD AUTO: 6.2 %
NEUTROPHILS # BLD AUTO: 2.38 X10*3/UL (ref 1.6–5.5)
NEUTROPHILS NFR BLD AUTO: 37 %
NRBC BLD-RTO: 0 /100 WBCS (ref 0–0)
PLATELET # BLD AUTO: 222 X10*3/UL (ref 150–450)
POTASSIUM SERPL-SCNC: 4.7 MMOL/L (ref 3.5–5.3)
PROT SERPL-MCNC: 7.5 G/DL (ref 6.4–8.2)
RBC # BLD AUTO: 3.69 X10*6/UL (ref 4.5–5.9)
SODIUM SERPL-SCNC: 138 MMOL/L (ref 136–145)
VIT B12 SERPL-MCNC: 233 PG/ML (ref 211–911)
WBC # BLD AUTO: 6.5 X10*3/UL (ref 4.4–11.3)

## 2024-07-29 PROCEDURE — 99214 OFFICE O/P EST MOD 30 MIN: CPT | Performed by: INTERNAL MEDICINE

## 2024-07-29 PROCEDURE — 1126F AMNT PAIN NOTED NONE PRSNT: CPT | Performed by: INTERNAL MEDICINE

## 2024-07-29 PROCEDURE — 1036F TOBACCO NON-USER: CPT | Performed by: INTERNAL MEDICINE

## 2024-07-29 PROCEDURE — 82607 VITAMIN B-12: CPT

## 2024-07-29 PROCEDURE — 3075F SYST BP GE 130 - 139MM HG: CPT | Performed by: INTERNAL MEDICINE

## 2024-07-29 PROCEDURE — 3078F DIAST BP <80 MM HG: CPT | Performed by: INTERNAL MEDICINE

## 2024-07-29 PROCEDURE — 1160F RVW MEDS BY RX/DR IN RCRD: CPT | Performed by: INTERNAL MEDICINE

## 2024-07-29 PROCEDURE — 85025 COMPLETE CBC W/AUTO DIFF WBC: CPT

## 2024-07-29 PROCEDURE — 1159F MED LIST DOCD IN RCRD: CPT | Performed by: INTERNAL MEDICINE

## 2024-07-29 PROCEDURE — 80053 COMPREHEN METABOLIC PANEL: CPT

## 2024-07-29 PROCEDURE — 36415 COLL VENOUS BLD VENIPUNCTURE: CPT

## 2024-07-29 RX ORDER — AMLODIPINE BESYLATE 5 MG/1
5 TABLET ORAL DAILY
Qty: 30 TABLET | Refills: 5 | Status: SHIPPED | OUTPATIENT
Start: 2024-07-29 | End: 2024-07-29

## 2024-07-29 RX ORDER — AMLODIPINE BESYLATE 5 MG/1
5 TABLET ORAL DAILY
Qty: 90 TABLET | Refills: 1 | Status: SHIPPED | OUTPATIENT
Start: 2024-07-29

## 2024-07-29 ASSESSMENT — ENCOUNTER SYMPTOMS
OCCASIONAL FEELINGS OF UNSTEADINESS: 0
DEPRESSION: 0
LOSS OF SENSATION IN FEET: 0

## 2024-07-29 ASSESSMENT — PATIENT HEALTH QUESTIONNAIRE - PHQ9
2. FEELING DOWN, DEPRESSED OR HOPELESS: NOT AT ALL
1. LITTLE INTEREST OR PLEASURE IN DOING THINGS: NOT AT ALL
SUM OF ALL RESPONSES TO PHQ9 QUESTIONS 1 AND 2: 0

## 2024-07-29 ASSESSMENT — PAIN SCALES - GENERAL: PAINLEVEL: 0-NO PAIN

## 2024-07-29 NOTE — ASSESSMENT & PLAN NOTE
Venous ultrasound right lower extremity to rule out DVT.  Decrease amount of sodium intake no more than 200 mg per serving.  Keep legs elevated while resting.  Recommend compression stocking during the day.  Remove at night.

## 2024-07-29 NOTE — PROGRESS NOTES
Subjective   Patient ID: Reid Cano is a 89 y.o. male who presents for Leg Swelling (Bilateral).    Follow-up visit.  He is in the room with his daughter.  He lives alone, family helps with shopping meals.  He has hypertension hypercholesterolemia, history of depression,  Vit B12 deficiency, aortic stenosis, Kaposi's sarcoma lower extremities.  Complains of lower extremities edema for 1 month actually right lower extremity is larger than the left 1.  Denies pain.  Denies chest pain shortness of breath.         Review of Systems   Cardiovascular:  Positive for leg swelling.   All other systems reviewed and are negative.      Objective   /67 (BP Location: Left arm, Patient Position: Sitting)   Pulse 54   Temp 36.4 °C (97.5 °F) (Temporal)   Resp 16   Wt 69.5 kg (153 lb 3.5 oz)   SpO2 97%   BMI 26.65 kg/m²     Physical Exam  Constitutional:       Appearance: Normal appearance.   HENT:      Head: Normocephalic and atraumatic.      Right Ear: External ear normal.      Left Ear: External ear normal.      Mouth/Throat:      Mouth: Mucous membranes are moist.      Pharynx: Oropharynx is clear.   Neck:      Vascular: No carotid bruit.   Cardiovascular:      Rate and Rhythm: Normal rate and regular rhythm.      Heart sounds: Murmur heard.      No gallop.   Pulmonary:      Effort: Pulmonary effort is normal. No respiratory distress.      Breath sounds: No wheezing or rales.   Abdominal:      General: Abdomen is flat.      Palpations: Abdomen is soft.      Hernia: No hernia is present.   Musculoskeletal:         General: No swelling, tenderness, deformity or signs of injury.      Cervical back: No rigidity or tenderness.      Right lower leg: Edema present.   Lymphadenopathy:      Cervical: No cervical adenopathy.   Skin:     Coloration: Skin is not jaundiced or pale.      Findings: No bruising, erythema, lesion or rash.   Neurological:      General: No focal deficit present.      Mental Status: He is oriented to  person, place, and time.      Motor: No weakness.      Coordination: Coordination normal.   Psychiatric:         Mood and Affect: Mood normal.         Behavior: Behavior normal.         Assessment/Plan   Problem List Items Addressed This Visit             ICD-10-CM    Anemia D64.9     Check CBC with differential.         Relevant Orders    CBC and Auto Differential    Vitamin B12    Chronic kidney disease (CKD), stage III (moderate) (Multi) N18.30     GFR 43 6 months ago.  Avoid dehydration and NSAIDs.  Check renal panel         Essential hypertension, benign I10     Hypertension : controlled blood pressure and continues same medications and educate a low salt diet do not exceed 200 mg per serving.   Keep monitor the blood pressure at home.   Decrease  Amlodipine to 5 mg. Daily.  10 mg daily may be the cause of legs edema.                 Relevant Medications    amLODIPine (Norvasc) 5 mg tablet    Other Relevant Orders    Comprehensive Metabolic Panel    Vitamin B12 deficiency E53.8     Check vitamin B12 level.         Leg edema, right - Primary R60.0     Venous ultrasound right lower extremity to rule out DVT.  Decrease amount of sodium intake no more than 200 mg per serving.  Keep legs elevated while resting.  Recommend compression stocking during the day.  Remove at night.         Relevant Orders    Vascular US lower extremity venous duplex right

## 2024-07-29 NOTE — ASSESSMENT & PLAN NOTE
Hypertension : controlled blood pressure and continues same medications and educate a low salt diet do not exceed 200 mg per serving.   Keep monitor the blood pressure at home.   Decrease  Amlodipine to 5 mg. Daily.  10 mg daily may be the cause of legs edema.

## 2024-07-30 DIAGNOSIS — N18.32 STAGE 3B CHRONIC KIDNEY DISEASE (MULTI): Primary | ICD-10-CM

## 2024-08-01 ENCOUNTER — APPOINTMENT (OUTPATIENT)
Dept: VASCULAR MEDICINE | Facility: CLINIC | Age: 89
End: 2024-08-01
Payer: MEDICARE

## 2024-08-13 ENCOUNTER — APPOINTMENT (OUTPATIENT)
Dept: VASCULAR MEDICINE | Facility: CLINIC | Age: 89
End: 2024-08-13
Payer: MEDICARE

## 2024-08-19 DIAGNOSIS — I10 ESSENTIAL HYPERTENSION, BENIGN: Primary | ICD-10-CM

## 2024-08-19 RX ORDER — LISINOPRIL 20 MG/1
20 TABLET ORAL DAILY
Qty: 90 TABLET | Refills: 1 | Status: SHIPPED | OUTPATIENT
Start: 2024-08-19

## 2024-09-05 DIAGNOSIS — E87.6 HYPOKALEMIA: ICD-10-CM

## 2024-09-05 RX ORDER — POTASSIUM CHLORIDE 1500 MG/1
20 TABLET, EXTENDED RELEASE ORAL DAILY
Qty: 90 TABLET | Refills: 1 | Status: SHIPPED | OUTPATIENT
Start: 2024-09-05

## 2024-09-17 ENCOUNTER — ANCILLARY PROCEDURE (OUTPATIENT)
Dept: VASCULAR MEDICINE | Facility: CLINIC | Age: 89
End: 2024-09-17
Payer: MEDICARE

## 2024-09-17 DIAGNOSIS — R60.0 LEG EDEMA, RIGHT: ICD-10-CM

## 2024-09-17 PROCEDURE — 93971 EXTREMITY STUDY: CPT

## 2024-09-17 PROCEDURE — 93971 EXTREMITY STUDY: CPT | Performed by: SURGERY

## 2024-10-17 DIAGNOSIS — F32.A DEPRESSION, UNSPECIFIED DEPRESSION TYPE: ICD-10-CM

## 2024-10-17 RX ORDER — NORTRIPTYLINE HYDROCHLORIDE 10 MG/1
10 CAPSULE ORAL NIGHTLY
Qty: 90 CAPSULE | Refills: 1 | Status: SHIPPED | OUTPATIENT
Start: 2024-10-17

## 2024-11-21 ENCOUNTER — OFFICE VISIT (OUTPATIENT)
Dept: URGENT CARE | Age: 89
End: 2024-11-21
Payer: MEDICARE

## 2024-11-21 ENCOUNTER — TELEPHONE (OUTPATIENT)
Dept: PRIMARY CARE | Facility: CLINIC | Age: 89
End: 2024-11-21
Payer: MEDICARE

## 2024-11-21 ENCOUNTER — APPOINTMENT (OUTPATIENT)
Dept: RADIOLOGY | Facility: HOSPITAL | Age: 89
End: 2024-11-21
Payer: MEDICARE

## 2024-11-21 ENCOUNTER — APPOINTMENT (OUTPATIENT)
Dept: CARDIOLOGY | Facility: HOSPITAL | Age: 89
End: 2024-11-21
Payer: MEDICARE

## 2024-11-21 ENCOUNTER — HOSPITAL ENCOUNTER (EMERGENCY)
Facility: HOSPITAL | Age: 89
Discharge: LEFT BEFORE DISCHARGE | End: 2024-11-21
Attending: STUDENT IN AN ORGANIZED HEALTH CARE EDUCATION/TRAINING PROGRAM
Payer: MEDICARE

## 2024-11-21 VITALS
SYSTOLIC BLOOD PRESSURE: 130 MMHG | HEART RATE: 68 BPM | OXYGEN SATURATION: 100 % | RESPIRATION RATE: 18 BRPM | TEMPERATURE: 97.9 F | DIASTOLIC BLOOD PRESSURE: 55 MMHG

## 2024-11-21 VITALS
HEART RATE: 63 BPM | BODY MASS INDEX: 25.61 KG/M2 | RESPIRATION RATE: 16 BRPM | WEIGHT: 150 LBS | TEMPERATURE: 98 F | OXYGEN SATURATION: 100 % | DIASTOLIC BLOOD PRESSURE: 57 MMHG | SYSTOLIC BLOOD PRESSURE: 130 MMHG | HEIGHT: 64 IN

## 2024-11-21 DIAGNOSIS — R53.1 GENERAL WEAKNESS: Primary | ICD-10-CM

## 2024-11-21 DIAGNOSIS — R53.1 GENERALIZED WEAKNESS: Primary | ICD-10-CM

## 2024-11-21 LAB
ALBUMIN SERPL BCP-MCNC: 4.2 G/DL (ref 3.4–5)
ALP SERPL-CCNC: 25 U/L (ref 33–136)
ALT SERPL W P-5'-P-CCNC: 7 U/L (ref 10–52)
ANION GAP SERPL CALC-SCNC: 13 MMOL/L (ref 10–20)
APPEARANCE UR: CLEAR
AST SERPL W P-5'-P-CCNC: 13 U/L (ref 9–39)
ATRIAL RATE: 61 BPM
BASOPHILS # BLD AUTO: 0.05 X10*3/UL (ref 0–0.1)
BASOPHILS NFR BLD AUTO: 0.8 %
BILIRUB SERPL-MCNC: 0.8 MG/DL (ref 0–1.2)
BILIRUB UR STRIP.AUTO-MCNC: NEGATIVE MG/DL
BUN SERPL-MCNC: 26 MG/DL (ref 6–23)
CALCIUM SERPL-MCNC: 9.3 MG/DL (ref 8.6–10.3)
CARDIAC TROPONIN I PNL SERPL HS: 113 NG/L (ref 0–20)
CHLORIDE SERPL-SCNC: 105 MMOL/L (ref 98–107)
CO2 SERPL-SCNC: 23 MMOL/L (ref 21–32)
COLOR UR: NORMAL
CREAT SERPL-MCNC: 1.71 MG/DL (ref 0.5–1.3)
EGFRCR SERPLBLD CKD-EPI 2021: 36 ML/MIN/1.73M*2
EOSINOPHIL # BLD AUTO: 0.15 X10*3/UL (ref 0–0.4)
EOSINOPHIL NFR BLD AUTO: 2.3 %
ERYTHROCYTE [DISTWIDTH] IN BLOOD BY AUTOMATED COUNT: 13.1 % (ref 11.5–14.5)
FLUAV RNA RESP QL NAA+PROBE: NOT DETECTED
FLUBV RNA RESP QL NAA+PROBE: NOT DETECTED
GLUCOSE SERPL-MCNC: 108 MG/DL (ref 74–99)
GLUCOSE UR STRIP.AUTO-MCNC: NORMAL MG/DL
HCT VFR BLD AUTO: 38.3 % (ref 41–52)
HGB BLD-MCNC: 12.4 G/DL (ref 13.5–17.5)
IMM GRANULOCYTES # BLD AUTO: 0.02 X10*3/UL (ref 0–0.5)
IMM GRANULOCYTES NFR BLD AUTO: 0.3 % (ref 0–0.9)
KETONES UR STRIP.AUTO-MCNC: NEGATIVE MG/DL
LEUKOCYTE ESTERASE UR QL STRIP.AUTO: NEGATIVE
LYMPHOCYTES # BLD AUTO: 2.54 X10*3/UL (ref 0.8–3)
LYMPHOCYTES NFR BLD AUTO: 39.1 %
MAGNESIUM SERPL-MCNC: 2.09 MG/DL (ref 1.6–2.4)
MCH RBC QN AUTO: 30.9 PG (ref 26–34)
MCHC RBC AUTO-ENTMCNC: 32.4 G/DL (ref 32–36)
MCV RBC AUTO: 96 FL (ref 80–100)
MONOCYTES # BLD AUTO: 0.39 X10*3/UL (ref 0.05–0.8)
MONOCYTES NFR BLD AUTO: 6 %
NEUTROPHILS # BLD AUTO: 3.35 X10*3/UL (ref 1.6–5.5)
NEUTROPHILS NFR BLD AUTO: 51.5 %
NITRITE UR QL STRIP.AUTO: NEGATIVE
NRBC BLD-RTO: 0 /100 WBCS (ref 0–0)
P AXIS: 49 DEGREES
P OFFSET: 173 MS
P ONSET: 128 MS
PH UR STRIP.AUTO: 5 [PH]
PLATELET # BLD AUTO: 259 X10*3/UL (ref 150–450)
POC FINGERSTICK BLOOD GLUCOSE: 106 MG/DL (ref 70–100)
POTASSIUM SERPL-SCNC: 5.2 MMOL/L (ref 3.5–5.3)
PR INTERVAL: 186 MS
PROT SERPL-MCNC: 8.3 G/DL (ref 6.4–8.2)
PROT UR STRIP.AUTO-MCNC: NEGATIVE MG/DL
Q ONSET: 221 MS
QRS COUNT: 11 BEATS
QRS DURATION: 86 MS
QT INTERVAL: 412 MS
QTC CALCULATION(BAZETT): 414 MS
QTC FREDERICIA: 414 MS
R AXIS: 17 DEGREES
RBC # BLD AUTO: 4.01 X10*6/UL (ref 4.5–5.9)
RBC # UR STRIP.AUTO: NEGATIVE /UL
RSV RNA RESP QL NAA+PROBE: NOT DETECTED
SARS-COV-2 RNA RESP QL NAA+PROBE: NOT DETECTED
SODIUM SERPL-SCNC: 136 MMOL/L (ref 136–145)
SP GR UR STRIP.AUTO: 1.01
T AXIS: 83 DEGREES
T OFFSET: 427 MS
UROBILINOGEN UR STRIP.AUTO-MCNC: NORMAL MG/DL
VENTRICULAR RATE: 61 BPM
WBC # BLD AUTO: 6.5 X10*3/UL (ref 4.4–11.3)

## 2024-11-21 PROCEDURE — 81003 URINALYSIS AUTO W/O SCOPE: CPT | Performed by: PHYSICIAN ASSISTANT

## 2024-11-21 PROCEDURE — 99283 EMERGENCY DEPT VISIT LOW MDM: CPT | Mod: 25

## 2024-11-21 PROCEDURE — 80053 COMPREHEN METABOLIC PANEL: CPT | Performed by: PHYSICIAN ASSISTANT

## 2024-11-21 PROCEDURE — 85025 COMPLETE CBC W/AUTO DIFF WBC: CPT | Performed by: PHYSICIAN ASSISTANT

## 2024-11-21 PROCEDURE — 84484 ASSAY OF TROPONIN QUANT: CPT | Performed by: PHYSICIAN ASSISTANT

## 2024-11-21 PROCEDURE — 87637 SARSCOV2&INF A&B&RSV AMP PRB: CPT | Performed by: PHYSICIAN ASSISTANT

## 2024-11-21 PROCEDURE — 36415 COLL VENOUS BLD VENIPUNCTURE: CPT | Performed by: PHYSICIAN ASSISTANT

## 2024-11-21 PROCEDURE — 71045 X-RAY EXAM CHEST 1 VIEW: CPT

## 2024-11-21 PROCEDURE — 71045 X-RAY EXAM CHEST 1 VIEW: CPT | Performed by: RADIOLOGY

## 2024-11-21 PROCEDURE — 83735 ASSAY OF MAGNESIUM: CPT | Performed by: PHYSICIAN ASSISTANT

## 2024-11-21 PROCEDURE — 93005 ELECTROCARDIOGRAM TRACING: CPT

## 2024-11-21 ASSESSMENT — COLUMBIA-SUICIDE SEVERITY RATING SCALE - C-SSRS
6. HAVE YOU EVER DONE ANYTHING, STARTED TO DO ANYTHING, OR PREPARED TO DO ANYTHING TO END YOUR LIFE?: NO
1. IN THE PAST MONTH, HAVE YOU WISHED YOU WERE DEAD OR WISHED YOU COULD GO TO SLEEP AND NOT WAKE UP?: NO
2. HAVE YOU ACTUALLY HAD ANY THOUGHTS OF KILLING YOURSELF?: NO

## 2024-11-21 ASSESSMENT — ACTIVITIES OF DAILY LIVING (ADL): LACK_OF_TRANSPORTATION: NO

## 2024-11-21 ASSESSMENT — PAIN - FUNCTIONAL ASSESSMENT: PAIN_FUNCTIONAL_ASSESSMENT: 0-10

## 2024-11-21 ASSESSMENT — PAIN SCALES - GENERAL: PAINLEVEL_OUTOF10: 0 - NO PAIN

## 2024-11-21 ASSESSMENT — ENCOUNTER SYMPTOMS
WEAKNESS: 1
NERVOUS/ANXIOUS: 1

## 2024-11-21 NOTE — ED TRIAGE NOTES
"TRIAGE NOTE   I saw the patient as the Clinician in Triage and performed a brief history and physical exam, established acuity, and ordered appropriate tests to develop basic plan of care. Patient will be seen by an MARIA ELENA, resident and/or physician who will independently evaluate the patient. Please see subsequent provider notes for further details and disposition.      Brief HPI:   Patient is a 89-year-old male with complicated PMH including HTN, HLD, CKD, CAD who presents to the ED with daughter for evaluation of general malaise, weakness.  She said yesterday he was complaining that he did not feel well.  Patient says today he feels better.  Yesterday he felt dizzy.  He denies dizziness currently. Cannot really articulate anything being wrong aside from \"yesterday I was dizzy\".   Does not really want to be in the hospital right now.  He denies chest pain, palpitations.  Lives alone.     Focused Physical exam:   Nonfocal neuroexam.  Van negative.  Elderly male.  Hard of hearing.  Breath sounds diminished.  Murmur.  Bilateral pedal edema.    Plan/MDM:   Chest x-ray, EKG, labs and urine.    Please see subsequent provider note for further details and disposition     "

## 2024-11-21 NOTE — ED TRIAGE NOTES
Pt here with c/o general weakness, dizzy lightheaded and just not feeling well no energy. Pt was sent here from

## 2024-11-21 NOTE — ED PROVIDER NOTES
History of Present Illness     History provided by: Patient and Family Member  Limitations to History: None  External Records Reviewed with Brief Summary:  None    HPI:  Reid Cano is a 89 y.o. male with h/o HTN, HLD, aortic stenosis, CKD3, Kaposi sarcoma the presents emergency room for feeling unwell.  Patient is with his daughter and states that yesterday he was feeling slightly nauseous and dizzy.  Patient not complaining of any chest pain, shortness of breath.  Patient states that he is feeling at his baseline today.  Daughter states that she was brought in given his concern for his wellbeing.  When asked the patient, he continued to state that he was feeling completely normal.  Patient was also complaining of some generalized weakness and lightheadedness yesterday.  Patient states that he did not want to be in the hospital right now.  Patient is denying any palpitations.    Physical Exam   Triage vitals:  T 36.6 °C (97.9 °F)  HR 59  /55  RR 18  O2 100 %      General: Awake, alert, in no acute distress  Eyes: Gaze conjugate.  No scleral icterus or injection  HENT: Normo-cephalic, atraumatic. No stridor  CV: Regular rate, regular rhythm. Radial pulses 2+ bilaterally  Resp: Breathing non-labored, speaking in full sentences.  Clear to auscultation bilaterally  GI: Soft, non-distended, non-tender. No rebound or guarding.  MSK/Extremities: No gross bony deformities. Moving all extremities  Skin: Warm. Appropriate color  Neuro: Alert. Oriented. Face symmetric. Speech is fluent.  Gross strength and sensation intact in b/l UE and LEs  Psych: Appropriate mood and affect    Medical Decision Making & ED Course   Medical Decision Makin y.o. male with h/o HTN, HLD, aortic stenosis, CKD3, Kaposi sarcoma the presents emergency room for feeling unwell.  Differential diagnosis includes upper respiratory tract infection, COVID, influenza, exacerbation of aortic stenosis, myocardial infarction.  Given his  symptoms, he was initiated on a nurse initiated protocol where they ordered urinalysis, RSV, influenza, COVID, troponin, CMP, CBC, magnesium, chest x-ray.  Chest x-ray shows mild pulmonary vascular congestion. Trace pleural effusions.  The rest of the labs are unremarkable with exception for creatinine however similar to baseline given his CKD and an elevated troponin.  Patient spoken both English and [place] and this provider also speaks in those languages.  We spoke to them multiple times about his elevated troponin and the risks that could have been if he leaves the emergency room and not be evaluated for his elevated troponin.  Patient stated that he understood the risks and risks as those risks, but still wanted to go home.  Both providers is significant time explaining to him the risk and benefits.   ----  Scoring Tools Utilized: None       Social Determinants of Health which Significantly Impact Care: None identified The following actions were taken to address these social determinants: None    EKG Independent Interpretation: EKG interpreted by myself. Please see ED Course for full interpretation.    Independent Result Review and Interpretation: Relevant laboratory and radiographic results were reviewed and independently interpreted by myself.  As necessary, they are commented on in the ED Course.    Chronic conditions affecting the patient's care: As documented above in [facility]    The patient was discussed with the following consultants/services: None    Care Considerations: As documented above in [facility]    ED Course:  Diagnoses as of 11/22/24 1035   Generalized weakness     Disposition   The patient elected to leave the Emergency Department against medical advice. In my opinion, the patient has capacity to leave AMA. he is clinically sober, free from distracting injury, appears to have intact insight, judgment, and reason, and in my opinion has capacity to make decisions. I explained to him that these symptoms may  represent a serious underlying medical condition and he verbalized understanding of my concerns and understands the consequences of leaving without complete evaluation.    I had a discussion with the patient about his workup and results, and informed him what the next step in diagnosis and treatment would be, and he verbalized understanding. I explained the risks of leaving without further workup or treatment, which included reasonably foreseeable complications such as death, serious injury, and permanent disability. I also offered alternatives to departing AMA.    I have asked him to return as soon as possible to complete his evaluation, and also explained that he is welcome to return to the ED for further evaluation whenever he chooses. I have asked him to follow up with his primary doctor as soon as possible. All of his questions were answered and he was given oral discharge instructions.    Procedures   Procedures    Patient seen and discussed with ED attending physician.    Rubia Correa MD  Emergency Medicine     Rubia Correa MD  Resident  11/22/24 1767

## 2024-11-21 NOTE — PROGRESS NOTES
11/21/24 1441   [facility] Disability Status   Are you deaf or do you have serious difficulty hearing? Y  ([place]- no aides used)   Are you blind or do you have serious difficulty seeing, even when wearing glasses? N   Because of a physical, mental, or emotional condition, do you have serious difficulty concentrating, remembering, or making decisions? (5 years old or older) N  (slow to respond to questions)   Do you have serious difficulty walking or climbing stairs? N  (no aides used)   Do you have serious difficulty dressing or bathing? N   Because of a physical, mental, or emotional condition, do you have serious difficulty doing errands alone such as visiting the doctor? Y  (daughter drives)

## 2024-11-21 NOTE — PROGRESS NOTES
Transitional Care Coordination Progress Note:  Plan per Medical/Surgical team: treatment of weakness & elevated trops, JAYDEN   Status: ED   Payor source: medicare A/B  Discharge disposition: Home alone  Met with patient & daughter @ bedside. Discussed purpose and benefits of home health care. Patient prefers to go home without additional assistance.    Potential Barriers: trop 113, renal 26/1.71  ADOD: 11/22/2024   MEAGAN Vides RN, BSN Transitional Care Coordinator ED# [phone]      11/21/24 1442   Discharge Planning   Living Arrangements Alone   Support Systems Children  (daughter)   Assistance Needed has house keeper 2x week, family visits daily   Type of Residence Private residence   Number of Stairs to Enter Residence 3   Number of Stairs Within Residence 0  (12 to basement, rarely used)   Do you have animals or pets at home? No   Home or Post Acute Services None   Expected Discharge Disposition Home   Does the patient need discharge transport arranged? No   Financial Resource Strain   How hard is it for you to pay for the very basics like food, housing, medical care, and heating? Not hard   Housing Stability   In the last 12 months, was there a time when you were not able to pay the mortgage or rent on time? N   In the past 12 months, how many times have you moved where you were living? 1   At any time in the past 12 months, were you homeless or living in a shelter (including now)? N   Transportation Needs   In the past 12 months, has lack of transportation kept you from medical appointments or from getting medications? no   In the past 12 months, has lack of transportation kept you from meetings, work, or from getting things needed for daily living? No   Patient Choice   Patient / Family choosing to utilize agency / facility established prior to hospitalization No   Stroke Family Assessment   Stroke Family Assessment Needed No   Intensity of Service   Intensity of Service 0-30 min

## 2024-11-21 NOTE — ED NOTES
Doc at bedside, attempting to explain risk associated with leaving. Family at bedside.      Mary Garcia RN  11/21/24 5641

## 2024-11-21 NOTE — PROGRESS NOTES
Patient is a 89-year-old male with significant past medical history including HTN, HLD, CKD and CAD who presented to the ED for generalized malaise and weakness.  Patient is primarily Hungarian-speaking, the resident physician that I saw this patient with  is fluent in Hungarian and served as .  Patient was initially seen in triage, evaluated, and EKG was obtained as well as some basic laboratory evaluation the patient was subsequently roomed.  I was able to evaluate the patient, on my evaluation, patient is a well-appearing 89-year-old in no acute distress sitting comfortably in the gurney.  Patient has no current complaints.  He corroborates the story above stating that he felt weak and generally unwell but denies any chest pain or shortness of breath.  Patient's physical exam was notable for symmetric chest rise with nonlabored breathing and clear breath sounds bilaterally.  He had a regular heart rate.  He had a soft and benign abdomen.  He was alert and orient x 4.  Moves all 4 extremity spontaneously and had intact sensation light touch.  Patient's EKG showed a normal heart rate without any obvious signs of ischemia or arrhythmia.  However, patient's initial troponin was 113 concerning for possible heart strain or possible myocarditis in the setting of feeling unwell.  Patient also had trace pleural effusions.    The patient requested to leave. I considered this to be leaving against medical advice. I personally discussed that following with them.    1. That they currently had a medical condition of: Elevated troponin and I am concerned that they have myocarditis or other serious pathology  2. My proposed course of evaluation and treatment and that of any consultants is: Admission for trending of troponin and echocardiogram as well as being monitored on telemetry.    3. Benefits would include: possible diagnosis or exclusion of myocarditis, heart strain, which if identified early would lead to  appropriate intervention in a timely manner lessing the burden of disability and death   4. Risks of leaving before this had been completed include: misdiagnosis, worsening illness leading up to and including prolonged or permanent disability or death. Specific risks pertinent, but not all inclusive, of their current medical condition include but are not limited to: Worsening heart strain, heart failure, worsening weakness, possible heart attack, and death.    5. I also discussed treatment alternatives including: Monitoring him without any additional labs.    6. Despite this they stated they wanted to leave due to being anxious and not wanting to be in the hospital and refused further evaluation, treatment, or admission at this time.     They appear clinically sober, to be mentating appropriately, free from distracting injury, have controlled pain, appear to have intact insight, judgement, and reason and in my opinion have the capacity to make this decision. Specifically, they were able to verbally state back in a coherent manner their current medical condition/current diagnosis, the proposes course of treatment, and the risks, benefits, and alternatives of treatment versus leaving against medical advice.  They understand that they may return to seek medical attention here at whatever time they want. I highly advised them to return to the Emergency Department immediately if they experienced any: Chest pain, shortness of breath, weakness,, reconsidered treatment, admission, or had any other concerns. This would be without any repercussions.    I recommended they follow-up with their primary care doctor or the emergency department within 24 hours for further evaluation and treatment.

## 2024-11-21 NOTE — PROGRESS NOTES
"Subjective   Patient ID: Reid Cano is a 89 y.o. male. They present today with a chief complaint of Weakness, Gen (Fatigue, general feeling weak, uncomfortable, and just generally concerned and worried).    History of Present Illness  Pt presents with daughter for evaluation of generalized weakness since yesterday. He told his daughter he just wasn't feel right, felt uncomfortable and concerned. She states he lives alone and tends to get \"worked up.\" Per chart review, daughter contacted pcp yesterday regarding symptoms. It says in this telephone encounter that he was not breathing right. When asking about this, daughter states \"I think he was just worked up.\" It was advised he go to the emergency room but they did not go because \"[facility] is no longer there.\" Pt denies any specific symptoms. Denies cp, sob, cough or uri sx, dizziness/lightheadedness, syncope, headache, abd pain, nvd, urinary sx, constipation, diarrhea, blood in stool. Daughter denies confusion. Eating and drinking normally.       History provided by:  Patient and relative (pt daughter)  History limited by:  Age (language barrier; pt daughter is translating)  Weakness, Gen      Past Medical History  Allergies as of 11/21/2024    (No Known Allergies)       (Not in a hospital admission)       Past Medical History:   Diagnosis Date    JAYDEN (acute kidney injury) ([facility]) 01/02/2024    Closed nondisplaced fracture of first cervical vertebra 01/26/2024    Dens fracture, closed, initial encounter (Multi) 01/01/2024    Dysphagia 01/26/2024    Edema of both lower legs 08/21/2023    Fall (on) (from) other stairs and steps, initial encounter 01/26/2024    Hip pain, right 12/19/2011    Multiple skin tears 01/02/2024    Onychomycosis 08/21/2023    Personal history of malignant neoplasm, unspecified 02/06/2017    History of Kaposi's sarcoma    Personal history of other diseases of the circulatory system     History of cardiac murmur    Pre-ulcerative " "alia 08/21/2023       No past surgical history on file.     reports that he has quit smoking. His smoking use included cigarettes. He has never used smokeless tobacco. He reports current alcohol use. He reports that he does not use drugs.    Review of Systems  Review of Systems   Neurological:  Positive for weakness (generalized).   Psychiatric/Behavioral:  The patient is nervous/anxious.    All other systems reviewed and are negative.                                 Objective    Vitals:    11/21/24 1056   BP: 130/57   BP Location: Left arm   Patient Position: Sitting   BP Cuff Size: Small adult   Pulse: 63   Resp: 16   Temp: 36.7 °C (98 °F)   TempSrc: Oral   SpO2: 100%   Weight: 68 kg (150 lb)   Height: 1.615 m (5' 3.58\")     No LMP for male patient.    Physical Exam  Constitutional:       General: He is not in acute distress.     Appearance: Normal appearance. He is not ill-appearing, toxic-appearing or diaphoretic.   HENT:      Head: Normocephalic and atraumatic.      Right Ear: Tympanic membrane, ear canal and external ear normal.      Left Ear: Tympanic membrane, ear canal and external ear normal.      Nose: Nose normal.      Mouth/Throat:      Mouth: Mucous membranes are moist.   Eyes:      Extraocular Movements: Extraocular movements intact.      Pupils: Pupils are equal, round, and reactive to light.   Cardiovascular:      Rate and Rhythm: Normal rate and regular rhythm.      Pulses: Normal pulses.      Heart sounds: Murmur heard.   Pulmonary:      Effort: Pulmonary effort is normal. No respiratory distress.      Breath sounds: No wheezing, rhonchi or rales.   Abdominal:      General: Bowel sounds are normal.      Palpations: Abdomen is soft.   Musculoskeletal:      Right lower leg: No edema.      Left lower leg: No edema.   Skin:     Capillary Refill: Capillary refill takes less than 2 seconds.      Findings: No rash.   Neurological:      General: No focal deficit present.      Mental Status: He is " alert and oriented to person, place, and time.      Cranial Nerves: No cranial nerve deficit.      Sensory: No sensory deficit.      Motor: No weakness.      Coordination: Coordination normal.      Gait: Gait normal.         Procedures    Point of Care Test & Imaging Results from this visit  No results found for this visit on 11/21/24.   No results found.    Diagnostic study results (if any) were reviewed by Selene Sofia PA-C.    Assessment/Plan   Allergies, medications, history, and pertinent labs/EKGs/Imaging reviewed by Selene Sofia PA-C.     Medical Decision Making    Pt referred to emergency department at this time for further evaluation. Broad differential at this time including but not limited to JAYDEN, UTI, ACS, CHF, arrhthymias, electrolyte disturbance, cardiomyopathy. Dicussed differential and my concern with pt and pt daughter. Daughter is agreeable to plan, will be taking him to [place] ED immediately from clinic via [facility]. I do feel he is stable for self transport at this time. They were advised to go directly to the ED and no where else. Advised of risks of not seeking further evaluation up to and including death. Pt daughter verbalizes understanding of risks.     Case d/w supervising physician Dr. Medrano and EKG reviewed.      Orders and Diagnoses  Diagnoses and all orders for this visit:  General weakness  -     ECG 12 Lead  -     POCT fingerstick glucose manually resulted      Medical Admin Record      Patient disposition: ED    Electronically signed by Selene Sofia PA-C  11:10 AM

## 2024-11-21 NOTE — ED NOTES
Received patient at this time for evaluation of 89 year old man who came to ED today for generalized weakness and malaise. Denies dizziness today, but states he felt dizzy with unsteady gait yesterday. Patient denies CP, SOB, palpitations. Labs drawn and sent. Family at bedside. Pending MD initial eval and plan of care.      Nicole Valladares RN  11/21/24 1441

## 2024-11-22 LAB — HOLD SPECIMEN: NORMAL

## 2024-11-27 ENCOUNTER — TELEPHONE (OUTPATIENT)
Dept: PRIMARY CARE | Facility: CLINIC | Age: 89
End: 2024-11-27

## 2024-11-27 ENCOUNTER — APPOINTMENT (OUTPATIENT)
Dept: PRIMARY CARE | Facility: CLINIC | Age: 89
End: 2024-11-27
Payer: MEDICARE

## 2024-12-03 ENCOUNTER — TELEPHONE (OUTPATIENT)
Dept: PRIMARY CARE | Facility: CLINIC | Age: 89
End: 2024-12-03
Payer: MEDICARE

## 2024-12-05 ENCOUNTER — APPOINTMENT (OUTPATIENT)
Dept: PRIMARY CARE | Facility: CLINIC | Age: 89
End: 2024-12-05
Payer: MEDICARE

## 2024-12-05 DIAGNOSIS — F41.9 ANXIETY: ICD-10-CM

## 2024-12-09 ENCOUNTER — APPOINTMENT (OUTPATIENT)
Dept: PRIMARY CARE | Facility: CLINIC | Age: 89
End: 2024-12-09
Payer: MEDICARE

## 2024-12-09 VITALS
OXYGEN SATURATION: 96 % | DIASTOLIC BLOOD PRESSURE: 65 MMHG | WEIGHT: 147.93 LBS | RESPIRATION RATE: 16 BRPM | HEART RATE: 71 BPM | SYSTOLIC BLOOD PRESSURE: 110 MMHG | BODY MASS INDEX: 25.73 KG/M2

## 2024-12-09 DIAGNOSIS — E78.01 FAMILIAL HYPERCHOLESTEROLEMIA: ICD-10-CM

## 2024-12-09 DIAGNOSIS — Z00.00 MEDICARE ANNUAL WELLNESS VISIT, SUBSEQUENT: Primary | ICD-10-CM

## 2024-12-09 DIAGNOSIS — I10 ESSENTIAL HYPERTENSION, BENIGN: ICD-10-CM

## 2024-12-09 DIAGNOSIS — I35.0 AORTIC VALVE STENOSIS, ETIOLOGY OF CARDIAC VALVE DISEASE UNSPECIFIED: ICD-10-CM

## 2024-12-09 DIAGNOSIS — Z23 NEED FOR IMMUNIZATION AGAINST INFLUENZA: ICD-10-CM

## 2024-12-09 PROCEDURE — 90656 IIV3 VACC NO PRSV 0.5 ML IM: CPT | Performed by: INTERNAL MEDICINE

## 2024-12-09 PROCEDURE — 1160F RVW MEDS BY RX/DR IN RCRD: CPT | Performed by: INTERNAL MEDICINE

## 2024-12-09 PROCEDURE — G0008 ADMIN INFLUENZA VIRUS VAC: HCPCS | Performed by: INTERNAL MEDICINE

## 2024-12-09 PROCEDURE — 1036F TOBACCO NON-USER: CPT | Performed by: INTERNAL MEDICINE

## 2024-12-09 PROCEDURE — G0439 PPPS, SUBSEQ VISIT: HCPCS | Performed by: INTERNAL MEDICINE

## 2024-12-09 PROCEDURE — 3074F SYST BP LT 130 MM HG: CPT | Performed by: INTERNAL MEDICINE

## 2024-12-09 PROCEDURE — 99213 OFFICE O/P EST LOW 20 MIN: CPT | Performed by: INTERNAL MEDICINE

## 2024-12-09 PROCEDURE — 3078F DIAST BP <80 MM HG: CPT | Performed by: INTERNAL MEDICINE

## 2024-12-09 PROCEDURE — 1170F FXNL STATUS ASSESSED: CPT | Performed by: INTERNAL MEDICINE

## 2024-12-09 PROCEDURE — 1123F ACP DISCUSS/DSCN MKR DOCD: CPT | Performed by: INTERNAL MEDICINE

## 2024-12-09 PROCEDURE — 1159F MED LIST DOCD IN RCRD: CPT | Performed by: INTERNAL MEDICINE

## 2024-12-09 RX ORDER — AMLODIPINE BESYLATE 2.5 MG/1
5 TABLET ORAL DAILY
Qty: 180 TABLET | Refills: 1 | Status: SHIPPED | OUTPATIENT
Start: 2024-12-09 | End: 2025-06-07

## 2024-12-09 ASSESSMENT — ACTIVITIES OF DAILY LIVING (ADL)
TAKING_MEDICATION: NEEDS ASSISTANCE
DOING_HOUSEWORK: TOTAL CARE
MANAGING_FINANCES: TOTAL CARE
GROCERY_SHOPPING: TOTAL CARE
BATHING: INDEPENDENT
DRESSING: INDEPENDENT

## 2024-12-09 ASSESSMENT — PATIENT HEALTH QUESTIONNAIRE - PHQ9
1. LITTLE INTEREST OR PLEASURE IN DOING THINGS: NOT AT ALL
SUM OF ALL RESPONSES TO PHQ9 QUESTIONS 1 AND 2: 0
2. FEELING DOWN, DEPRESSED OR HOPELESS: NOT AT ALL

## 2024-12-09 ASSESSMENT — ENCOUNTER SYMPTOMS
LOSS OF SENSATION IN FEET: 0
DEPRESSION: 0
OCCASIONAL FEELINGS OF UNSTEADINESS: 0

## 2024-12-09 NOTE — PROGRESS NOTES
Subjective   Patient ID: Reid Cano is a 89 y.o. male who presents for Medicare Annual Wellness Visit Subsequent.    Subsequent Medicare wellness visit.  He is in the room with his daughter and son-in-law.  Recently he was taken to emergency room by family for complaints of chest discomfort.  Found to have high troponin, ER physician recommended admission but he declined, wanted to return home.  He has intact judgment.  Did not have any more chest discomfort or shortness of breath.  He has hypertension hypercholesterolemia, severe aortic stenosis, congestive heart failure, chronic kidney disease, last GFR 36.  He has Kaposi's sarcoma, chronic edema right lower extremity.  He lives alone, family is visiting regularly, helps him taking the medications correctly.  Today he has no complaints         Review of Systems   Cardiovascular:  Positive for leg swelling.   All other systems reviewed and are negative.      Objective   /61 (BP Location: Left arm, Patient Position: Sitting)   Pulse 71   Resp 16   Wt 67.1 kg (147 lb 14.9 oz)   SpO2 96%   BMI 25.73 kg/m²     Physical Exam  Constitutional:       Appearance: Normal appearance. He is ill-appearing.   HENT:      Head: Normocephalic and atraumatic.      Right Ear: External ear normal.      Left Ear: External ear normal.      Mouth/Throat:      Mouth: Mucous membranes are moist.      Pharynx: Oropharynx is clear.   Neck:      Vascular: No carotid bruit.   Cardiovascular:      Rate and Rhythm: Normal rate and regular rhythm.      Heart sounds: Murmur heard.      No gallop.   Pulmonary:      Effort: Pulmonary effort is normal. No respiratory distress.      Breath sounds: No wheezing or rales.   Abdominal:      General: Abdomen is flat.      Palpations: Abdomen is soft.      Hernia: No hernia is present.   Musculoskeletal:         General: Swelling present. No tenderness, deformity or signs of injury.      Cervical back: No rigidity or tenderness.      Right  lower leg: No edema.      Comments: Rt. Lower extremity chronic edema, venous insufficiency.   Lymphadenopathy:      Cervical: No cervical adenopathy.   Skin:     Coloration: Skin is not jaundiced or pale.      Findings: No bruising, erythema, lesion or rash.      Comments: Kaposi's sarcoma lower extremities   Neurological:      General: No focal deficit present.      Mental Status: He is oriented to person, place, and time.      Motor: No weakness.      Coordination: Coordination normal.   Psychiatric:         Mood and Affect: Mood normal.         Behavior: Behavior normal.         Assessment/Plan   Problem List Items Addressed This Visit             ICD-10-CM    Aortic stenosis I35.0     Severe aortic stenosis, can cause dizziness chest pain, affecting, shortness of breath.  Recommend comfort care consult hospice.  He has DNR form signed.  He wishes not to go to hospital if develops chest pain, shortness of breath.         Relevant Medications    amLODIPine (Norvasc) 2.5 mg tablet    Hyperlipidemia E78.5     Discontinue atorvastatin         Essential hypertension, benign I10     Blood pressure on the low side my reading was 110/70.  Advised to decrease amlodipine to 2.5 mg daily, prescription sent to pharmacy for 2.5 mg daily         Relevant Medications    amLODIPine (Norvasc) 2.5 mg tablet    Medicare annual wellness visit, subsequent - Primary Z00.00     Received Influenza vaccine today.  He has multiple chronic medical conditions severe aortic stenosis, congestive heart failure, chronic kidney disease, Kaposi's sarcoma, carotid artery stenosis.  He wishes comfort care, has DNR.  He is competent to make his own decisions.  Recommend hospice care.          Other Visit Diagnoses         Codes    Need for immunization against influenza     Z23    Relevant Orders    Flu vaccine, trivalent, preservative free, age 6 months and greater (Fluarix/Fluzone/Flulaval) (Completed)

## 2024-12-10 NOTE — ASSESSMENT & PLAN NOTE
Received Influenza vaccine today.  He has multiple chronic medical conditions severe aortic stenosis, congestive heart failure, chronic kidney disease, Kaposi's sarcoma, carotid artery stenosis.  He wishes comfort care, has DNR.  He is competent to make his own decisions.  Recommend hospice care.

## 2024-12-10 NOTE — ASSESSMENT & PLAN NOTE
Blood pressure on the low side my reading was 110/70.  Advised to decrease amlodipine to 2.5 mg daily, prescription sent to pharmacy for 2.5 mg daily

## 2024-12-10 NOTE — ASSESSMENT & PLAN NOTE
Severe aortic stenosis, can cause dizziness chest pain, affecting, shortness of breath.  Recommend comfort care consult hospice.  He has DNR form signed.  He wishes not to go to hospital if develops chest pain, shortness of breath.

## 2024-12-16 DIAGNOSIS — I50.32 CHRONIC DIASTOLIC CONGESTIVE HEART FAILURE: Primary | ICD-10-CM

## 2024-12-16 DIAGNOSIS — I35.0 AORTIC VALVE STENOSIS, ETIOLOGY OF CARDIAC VALVE DISEASE UNSPECIFIED: ICD-10-CM

## 2024-12-18 RX ORDER — SERTRALINE HYDROCHLORIDE 50 MG/1
50 TABLET, FILM COATED ORAL NIGHTLY
Qty: 90 TABLET | Refills: 1 | Status: SHIPPED | OUTPATIENT
Start: 2024-12-18 | End: 2025-06-16

## 2024-12-31 ENCOUNTER — APPOINTMENT (OUTPATIENT)
Dept: NEPHROLOGY | Facility: CLINIC | Age: 89
End: 2024-12-31
Payer: MEDICARE

## 2025-01-02 ENCOUNTER — TELEPHONE (OUTPATIENT)
Dept: PRIMARY CARE | Facility: CLINIC | Age: OVER 89
End: 2025-01-02

## 2025-01-10 ENCOUNTER — APPOINTMENT (OUTPATIENT)
Dept: CARDIOLOGY | Facility: HOSPITAL | Age: OVER 89
End: 2025-01-10
Payer: MEDICARE

## 2025-01-10 ENCOUNTER — APPOINTMENT (OUTPATIENT)
Dept: RADIOLOGY | Facility: HOSPITAL | Age: OVER 89
End: 2025-01-10
Payer: MEDICARE

## 2025-01-10 ENCOUNTER — HOSPITAL ENCOUNTER (INPATIENT)
Facility: HOSPITAL | Age: OVER 89
End: 2025-01-10
Attending: EMERGENCY MEDICINE | Admitting: STUDENT IN AN ORGANIZED HEALTH CARE EDUCATION/TRAINING PROGRAM
Payer: MEDICARE

## 2025-01-10 DIAGNOSIS — R01.1 CARDIAC MURMUR, UNSPECIFIED: ICD-10-CM

## 2025-01-10 DIAGNOSIS — R79.89 TROPONIN I ABOVE REFERENCE RANGE: ICD-10-CM

## 2025-01-10 DIAGNOSIS — R60.0 LEG EDEMA, RIGHT: ICD-10-CM

## 2025-01-10 DIAGNOSIS — K21.9 GASTROESOPHAGEAL REFLUX DISEASE WITHOUT ESOPHAGITIS: ICD-10-CM

## 2025-01-10 DIAGNOSIS — I35.1 AORTIC VALVE REGURGITATION, NONRHEUMATIC: ICD-10-CM

## 2025-01-10 DIAGNOSIS — R52 PAIN: ICD-10-CM

## 2025-01-10 DIAGNOSIS — I50.9 CONGESTIVE HEART FAILURE, UNSPECIFIED HF CHRONICITY, UNSPECIFIED HEART FAILURE TYPE: Primary | ICD-10-CM

## 2025-01-10 DIAGNOSIS — I35.0 AORTIC VALVE STENOSIS, ETIOLOGY OF CARDIAC VALVE DISEASE UNSPECIFIED: ICD-10-CM

## 2025-01-10 DIAGNOSIS — I35.8 HEART MURMUR, AORTIC: ICD-10-CM

## 2025-01-10 DIAGNOSIS — C46.9 KAPOSI SARCOMA (MULTI): ICD-10-CM

## 2025-01-10 DIAGNOSIS — I65.22 OCCLUSION AND STENOSIS OF LEFT CAROTID ARTERY: ICD-10-CM

## 2025-01-10 DIAGNOSIS — J96.01 ACUTE HYPOXIC RESPIRATORY FAILURE (MULTI): ICD-10-CM

## 2025-01-10 LAB
ALBUMIN SERPL BCP-MCNC: 4 G/DL (ref 3.4–5)
ALP SERPL-CCNC: 23 U/L (ref 33–136)
ALT SERPL W P-5'-P-CCNC: 6 U/L (ref 10–52)
ANION GAP BLDA CALCULATED.4IONS-SCNC: 15 MMO/L (ref 10–25)
ANION GAP SERPL CALCULATED.3IONS-SCNC: 16 MMOL/L (ref 10–20)
APPARATUS: ABNORMAL
APTT PPP: 24.9 SECONDS (ref 22–32.5)
ARTERIAL PATENCY WRIST A: POSITIVE
AST SERPL W P-5'-P-CCNC: 12 U/L (ref 9–39)
BASE EXCESS BLDA CALC-SCNC: -5.7 MMOL/L (ref -2–3)
BASOPHILS # BLD AUTO: 0.06 X10*3/UL (ref 0–0.1)
BASOPHILS NFR BLD AUTO: 0.5 %
BILIRUB SERPL-MCNC: 0.7 MG/DL (ref 0–1.2)
BNP SERPL-MCNC: 3620 PG/ML (ref 0–99)
BODY TEMPERATURE: 37 DEGREES CELSIUS
BUN SERPL-MCNC: 25 MG/DL (ref 6–23)
CA-I BLDA-SCNC: 1.19 MMOL/L (ref 1.1–1.33)
CALCIUM SERPL-MCNC: 9 MG/DL (ref 8.6–10.3)
CARDIAC TROPONIN I PNL SERPL HS: 170 NG/L (ref 0–20)
CARDIAC TROPONIN I PNL SERPL HS: 195 NG/L (ref 0–20)
CHLORIDE BLDA-SCNC: 103 MMOL/L (ref 98–107)
CHLORIDE SERPL-SCNC: 102 MMOL/L (ref 98–107)
CO2 SERPL-SCNC: 20 MMOL/L (ref 21–32)
CREAT SERPL-MCNC: 1.52 MG/DL (ref 0.5–1.3)
EGFRCR SERPLBLD CKD-EPI 2021: 42 ML/MIN/1.73M*2
EOSINOPHIL # BLD AUTO: 0.09 X10*3/UL (ref 0–0.4)
EOSINOPHIL NFR BLD AUTO: 0.7 %
EPAP CMH2O: 5 CM H2O
ERYTHROCYTE [DISTWIDTH] IN BLOOD BY AUTOMATED COUNT: 13.2 % (ref 11.5–14.5)
FLUAV RNA RESP QL NAA+PROBE: NOT DETECTED
FLUBV RNA RESP QL NAA+PROBE: NOT DETECTED
GLUCOSE BLDA-MCNC: 172 MG/DL (ref 74–99)
GLUCOSE SERPL-MCNC: 150 MG/DL (ref 74–99)
HCO3 BLDA-SCNC: 18.2 MMOL/L (ref 22–26)
HCT VFR BLD AUTO: 35.8 % (ref 41–52)
HCT VFR BLD EST: 37 % (ref 41–52)
HGB BLD-MCNC: 11.8 G/DL (ref 13.5–17.5)
HGB BLDA-MCNC: 12.3 G/DL (ref 13.5–17.5)
IMM GRANULOCYTES # BLD AUTO: 0.05 X10*3/UL (ref 0–0.5)
IMM GRANULOCYTES NFR BLD AUTO: 0.4 % (ref 0–0.9)
INHALED O2 CONCENTRATION: 100 %
INR PPP: 1 (ref 0.9–1.2)
IPAP CMH2O: 15 CM H2O
LACTATE BLDA-SCNC: 2.5 MMOL/L (ref 0.4–2)
LACTATE SERPL-SCNC: 1.7 MMOL/L (ref 0.4–2)
LYMPHOCYTES # BLD AUTO: 3.41 X10*3/UL (ref 0.8–3)
LYMPHOCYTES NFR BLD AUTO: 28.2 %
MCH RBC QN AUTO: 31.2 PG (ref 26–34)
MCHC RBC AUTO-ENTMCNC: 33 G/DL (ref 32–36)
MCV RBC AUTO: 95 FL (ref 80–100)
MONOCYTES # BLD AUTO: 0.81 X10*3/UL (ref 0.05–0.8)
MONOCYTES NFR BLD AUTO: 6.7 %
NEUTROPHILS # BLD AUTO: 7.68 X10*3/UL (ref 1.6–5.5)
NEUTROPHILS NFR BLD AUTO: 63.5 %
NRBC BLD-RTO: 0 /100 WBCS (ref 0–0)
OXYHGB MFR BLDA: 97.8 % (ref 94–98)
PCO2 BLDA: 30 MM HG (ref 38–42)
PH BLDA: 7.39 PH (ref 7.38–7.42)
PLATELET # BLD AUTO: 311 X10*3/UL (ref 150–450)
PO2 BLDA: 185 MM HG (ref 85–95)
POTASSIUM BLDA-SCNC: 4.3 MMOL/L (ref 3.5–5.3)
POTASSIUM SERPL-SCNC: 3.9 MMOL/L (ref 3.5–5.3)
PROT SERPL-MCNC: 7.6 G/DL (ref 6.4–8.2)
PROTHROMBIN TIME: 10.8 SECONDS (ref 9.3–12.7)
RBC # BLD AUTO: 3.78 X10*6/UL (ref 4.5–5.9)
SAO2 % BLDA: 100 % (ref 94–100)
SARS-COV-2 RNA RESP QL NAA+PROBE: NOT DETECTED
SODIUM BLDA-SCNC: 132 MMOL/L (ref 136–145)
SODIUM SERPL-SCNC: 134 MMOL/L (ref 136–145)
SPECIMEN DRAWN FROM PATIENT: ABNORMAL
VENTILATOR MODE: ABNORMAL
WBC # BLD AUTO: 12.1 X10*3/UL (ref 4.4–11.3)

## 2025-01-10 PROCEDURE — 83880 ASSAY OF NATRIURETIC PEPTIDE: CPT

## 2025-01-10 PROCEDURE — 85610 PROTHROMBIN TIME: CPT

## 2025-01-10 PROCEDURE — 93005 ELECTROCARDIOGRAM TRACING: CPT

## 2025-01-10 PROCEDURE — 96375 TX/PRO/DX INJ NEW DRUG ADDON: CPT

## 2025-01-10 PROCEDURE — 87636 SARSCOV2 & INF A&B AMP PRB: CPT

## 2025-01-10 PROCEDURE — 85018 HEMOGLOBIN: CPT

## 2025-01-10 PROCEDURE — 84484 ASSAY OF TROPONIN QUANT: CPT

## 2025-01-10 PROCEDURE — 2500000004 HC RX 250 GENERAL PHARMACY W/ HCPCS (ALT 636 FOR OP/ED)

## 2025-01-10 PROCEDURE — 36415 COLL VENOUS BLD VENIPUNCTURE: CPT

## 2025-01-10 PROCEDURE — 96367 TX/PROPH/DG ADDL SEQ IV INF: CPT

## 2025-01-10 PROCEDURE — 96365 THER/PROPH/DIAG IV INF INIT: CPT

## 2025-01-10 PROCEDURE — 99291 CRITICAL CARE FIRST HOUR: CPT | Performed by: EMERGENCY MEDICINE

## 2025-01-10 PROCEDURE — 99291 CRITICAL CARE FIRST HOUR: CPT

## 2025-01-10 PROCEDURE — 84132 ASSAY OF SERUM POTASSIUM: CPT

## 2025-01-10 PROCEDURE — 71045 X-RAY EXAM CHEST 1 VIEW: CPT

## 2025-01-10 PROCEDURE — 2500000001 HC RX 250 WO HCPCS SELF ADMINISTERED DRUGS (ALT 637 FOR MEDICARE OP)

## 2025-01-10 PROCEDURE — 93010 ELECTROCARDIOGRAM REPORT: CPT | Performed by: INTERNAL MEDICINE

## 2025-01-10 PROCEDURE — 2500000005 HC RX 250 GENERAL PHARMACY W/O HCPCS

## 2025-01-10 PROCEDURE — 2060000001 HC INTERMEDIATE ICU ROOM DAILY

## 2025-01-10 PROCEDURE — 87040 BLOOD CULTURE FOR BACTERIA: CPT | Mod: WESLAB

## 2025-01-10 PROCEDURE — 85025 COMPLETE CBC W/AUTO DIFF WBC: CPT

## 2025-01-10 PROCEDURE — 36600 WITHDRAWAL OF ARTERIAL BLOOD: CPT

## 2025-01-10 PROCEDURE — 82947 ASSAY GLUCOSE BLOOD QUANT: CPT

## 2025-01-10 PROCEDURE — 83605 ASSAY OF LACTIC ACID: CPT

## 2025-01-10 PROCEDURE — 71045 X-RAY EXAM CHEST 1 VIEW: CPT | Mod: FOREIGN READ | Performed by: RADIOLOGY

## 2025-01-10 PROCEDURE — 94660 CPAP INITIATION&MGMT: CPT

## 2025-01-10 PROCEDURE — 99223 1ST HOSP IP/OBS HIGH 75: CPT | Performed by: STUDENT IN AN ORGANIZED HEALTH CARE EDUCATION/TRAINING PROGRAM

## 2025-01-10 PROCEDURE — 85730 THROMBOPLASTIN TIME PARTIAL: CPT

## 2025-01-10 RX ORDER — FUROSEMIDE 10 MG/ML
40 INJECTION INTRAMUSCULAR; INTRAVENOUS ONCE
Status: COMPLETED | OUTPATIENT
Start: 2025-01-10 | End: 2025-01-10

## 2025-01-10 RX ORDER — NAPROXEN SODIUM 220 MG/1
324 TABLET, FILM COATED ORAL ONCE
Status: COMPLETED | OUTPATIENT
Start: 2025-01-10 | End: 2025-01-10

## 2025-01-10 RX ORDER — CEFTRIAXONE 2 G/50ML
2 INJECTION, SOLUTION INTRAVENOUS ONCE
Status: COMPLETED | OUTPATIENT
Start: 2025-01-10 | End: 2025-01-10

## 2025-01-10 RX ADMIN — ASPIRIN 324 MG: 81 TABLET, CHEWABLE ORAL at 22:03

## 2025-01-10 RX ADMIN — CEFTRIAXONE SODIUM 2 G: 2 INJECTION, SOLUTION INTRAVENOUS at 20:09

## 2025-01-10 RX ADMIN — Medication 100 PERCENT: at 19:35

## 2025-01-10 RX ADMIN — Medication 100 PERCENT: at 19:39

## 2025-01-10 RX ADMIN — DEXTROSE MONOHYDRATE 490.2 MG: 50 INJECTION, SOLUTION INTRAVENOUS at 21:05

## 2025-01-10 RX ADMIN — FUROSEMIDE 40 MG: 10 INJECTION, SOLUTION INTRAMUSCULAR; INTRAVENOUS at 22:03

## 2025-01-10 ASSESSMENT — LIFESTYLE VARIABLES
EVER FELT BAD OR GUILTY ABOUT YOUR DRINKING: NO
TOTAL SCORE: 0
EVER HAD A DRINK FIRST THING IN THE MORNING TO STEADY YOUR NERVES TO GET RID OF A HANGOVER: NO
HAVE YOU EVER FELT YOU SHOULD CUT DOWN ON YOUR DRINKING: NO
HAVE PEOPLE ANNOYED YOU BY CRITICIZING YOUR DRINKING: NO

## 2025-01-10 ASSESSMENT — COLUMBIA-SUICIDE SEVERITY RATING SCALE - C-SSRS
6. HAVE YOU EVER DONE ANYTHING, STARTED TO DO ANYTHING, OR PREPARED TO DO ANYTHING TO END YOUR LIFE?: NO
2. HAVE YOU ACTUALLY HAD ANY THOUGHTS OF KILLING YOURSELF?: NO
1. IN THE PAST MONTH, HAVE YOU WISHED YOU WERE DEAD OR WISHED YOU COULD GO TO SLEEP AND NOT WAKE UP?: NO

## 2025-01-10 ASSESSMENT — PAIN SCALES - GENERAL: PAINLEVEL_OUTOF10: 0 - NO PAIN

## 2025-01-10 ASSESSMENT — PAIN - FUNCTIONAL ASSESSMENT: PAIN_FUNCTIONAL_ASSESSMENT: 0-10

## 2025-01-11 ENCOUNTER — APPOINTMENT (OUTPATIENT)
Dept: CARDIOLOGY | Facility: HOSPITAL | Age: OVER 89
End: 2025-01-11
Payer: MEDICARE

## 2025-01-11 ENCOUNTER — APPOINTMENT (OUTPATIENT)
Dept: RADIOLOGY | Facility: HOSPITAL | Age: OVER 89
End: 2025-01-11
Payer: MEDICARE

## 2025-01-11 PROBLEM — J96.01 ACUTE HYPOXIC RESPIRATORY FAILURE (MULTI): Status: ACTIVE | Noted: 2025-01-11

## 2025-01-11 PROBLEM — N18.9 ACUTE KIDNEY INJURY SUPERIMPOSED ON CKD (CMS-HCC): Status: ACTIVE | Noted: 2024-01-02

## 2025-01-11 PROBLEM — N17.9 ACUTE KIDNEY INJURY SUPERIMPOSED ON CKD (CMS-HCC): Status: ACTIVE | Noted: 2024-01-02

## 2025-01-11 PROBLEM — J18.9 PNEUMONIA: Status: ACTIVE | Noted: 2025-01-11

## 2025-01-11 LAB
25(OH)D3 SERPL-MCNC: 60 NG/ML (ref 30–100)
ANION GAP SERPL CALCULATED.3IONS-SCNC: 15 MMOL/L (ref 10–20)
AORTIC VALVE MEAN GRADIENT: 51 MMHG
AORTIC VALVE PEAK VELOCITY: 4.59 M/S
APPEARANCE UR: CLEAR
AV PEAK GRADIENT: 84 MMHG
AVA (PEAK VEL): 0.5 CM2
AVA (VTI): 0.51 CM2
BILIRUB UR STRIP.AUTO-MCNC: NEGATIVE MG/DL
BUN SERPL-MCNC: 28 MG/DL (ref 6–23)
CALCIUM SERPL-MCNC: 9 MG/DL (ref 8.6–10.3)
CARDIAC TROPONIN I PNL SERPL HS: 255 NG/L (ref 0–20)
CARDIAC TROPONIN I PNL SERPL HS: 263 NG/L (ref 0–20)
CHLORIDE SERPL-SCNC: 103 MMOL/L (ref 98–107)
CHLORIDE UR-SCNC: 127 MMOL/L
CHLORIDE/CREATININE (MMOL/G) IN URINE: 789 MMOL/G CREAT (ref 23–275)
CO2 SERPL-SCNC: 22 MMOL/L (ref 21–32)
COLOR UR: COLORLESS
CREAT SERPL-MCNC: 1.43 MG/DL (ref 0.5–1.3)
CREAT UR-MCNC: 16.1 MG/DL (ref 20–370)
CREAT UR-MCNC: 16.1 MG/DL (ref 20–370)
EGFRCR SERPLBLD CKD-EPI 2021: 45 ML/MIN/1.73M*2
EJECTION FRACTION APICAL 4 CHAMBER: 58.6
EJECTION FRACTION: 53 %
ERYTHROCYTE [DISTWIDTH] IN BLOOD BY AUTOMATED COUNT: 13.1 % (ref 11.5–14.5)
FERRITIN SERPL-MCNC: 337 NG/ML (ref 20–300)
FOLATE SERPL-MCNC: 9.4 NG/ML
GLUCOSE BLD MANUAL STRIP-MCNC: 141 MG/DL (ref 74–99)
GLUCOSE SERPL-MCNC: 114 MG/DL (ref 74–99)
GLUCOSE UR STRIP.AUTO-MCNC: NORMAL MG/DL
HCT VFR BLD AUTO: 37.1 % (ref 41–52)
HGB BLD-MCNC: 12.5 G/DL (ref 13.5–17.5)
HOLD SPECIMEN: NORMAL
IRON SATN MFR SERPL: 12 % (ref 25–45)
IRON SERPL-MCNC: 27 UG/DL (ref 35–150)
KETONES UR STRIP.AUTO-MCNC: NEGATIVE MG/DL
LACTATE BLDV-SCNC: 1.1 MMOL/L (ref 0.4–2)
LEFT VENTRICULAR OUTFLOW TRACT DIAMETER: 2.05 CM
LEUKOCYTE ESTERASE UR QL STRIP.AUTO: NEGATIVE
LV EJECTION FRACTION BIPLANE: 62 %
MCH RBC QN AUTO: 31.6 PG (ref 26–34)
MCHC RBC AUTO-ENTMCNC: 33.7 G/DL (ref 32–36)
MCV RBC AUTO: 94 FL (ref 80–100)
MITRAL VALVE E/A RATIO: 1.53
NITRITE UR QL STRIP.AUTO: NEGATIVE
NRBC BLD-RTO: 0 /100 WBCS (ref 0–0)
PH UR STRIP.AUTO: 5 [PH]
PLATELET # BLD AUTO: 261 X10*3/UL (ref 150–450)
POTASSIUM SERPL-SCNC: 3.8 MMOL/L (ref 3.5–5.3)
POTASSIUM UR-SCNC: 19 MMOL/L
POTASSIUM/CREAT UR-RTO: 118 MMOL/G CREAT
PROT UR STRIP.AUTO-MCNC: NEGATIVE MG/DL
PROT UR-MCNC: <4 MG/DL (ref 5–25)
PROT/CREAT UR: ABNORMAL MG/G{CREAT}
PTH-INTACT SERPL-MCNC: 32.2 PG/ML (ref 18.5–88)
RBC # BLD AUTO: 3.96 X10*6/UL (ref 4.5–5.9)
RBC # UR STRIP.AUTO: NEGATIVE /UL
RIGHT VENTRICLE PEAK SYSTOLIC PRESSURE: 52.6 MMHG
SODIUM SERPL-SCNC: 136 MMOL/L (ref 136–145)
SODIUM UR-SCNC: 105 MMOL/L
SODIUM/CREAT UR-RTO: 652 MMOL/G CREAT
SP GR UR STRIP.AUTO: 1.01
TIBC SERPL-MCNC: 229 UG/DL (ref 240–445)
UIBC SERPL-MCNC: 202 UG/DL (ref 110–370)
UROBILINOGEN UR STRIP.AUTO-MCNC: NORMAL MG/DL
VIT B12 SERPL-MCNC: 504 PG/ML (ref 211–911)
WBC # BLD AUTO: 7.9 X10*3/UL (ref 4.4–11.3)

## 2025-01-11 PROCEDURE — 84300 ASSAY OF URINE SODIUM: CPT | Performed by: INTERNAL MEDICINE

## 2025-01-11 PROCEDURE — 93306 TTE W/DOPPLER COMPLETE: CPT | Performed by: INTERNAL MEDICINE

## 2025-01-11 PROCEDURE — 84156 ASSAY OF PROTEIN URINE: CPT | Performed by: INTERNAL MEDICINE

## 2025-01-11 PROCEDURE — 81003 URINALYSIS AUTO W/O SCOPE: CPT | Performed by: INTERNAL MEDICINE

## 2025-01-11 PROCEDURE — 85027 COMPLETE CBC AUTOMATED: CPT | Performed by: STUDENT IN AN ORGANIZED HEALTH CARE EDUCATION/TRAINING PROGRAM

## 2025-01-11 PROCEDURE — 93306 TTE W/DOPPLER COMPLETE: CPT

## 2025-01-11 PROCEDURE — 76770 US EXAM ABDO BACK WALL COMP: CPT

## 2025-01-11 PROCEDURE — 2500000002 HC RX 250 W HCPCS SELF ADMINISTERED DRUGS (ALT 637 FOR MEDICARE OP, ALT 636 FOR OP/ED): Performed by: STUDENT IN AN ORGANIZED HEALTH CARE EDUCATION/TRAINING PROGRAM

## 2025-01-11 PROCEDURE — 87449 NOS EACH ORGANISM AG IA: CPT | Mod: WESLAB | Performed by: STUDENT IN AN ORGANIZED HEALTH CARE EDUCATION/TRAINING PROGRAM

## 2025-01-11 PROCEDURE — 36415 COLL VENOUS BLD VENIPUNCTURE: CPT | Performed by: STUDENT IN AN ORGANIZED HEALTH CARE EDUCATION/TRAINING PROGRAM

## 2025-01-11 PROCEDURE — 71250 CT THORAX DX C-: CPT | Performed by: RADIOLOGY

## 2025-01-11 PROCEDURE — 1210000001 HC SEMI-PRIVATE ROOM DAILY

## 2025-01-11 PROCEDURE — 2500000004 HC RX 250 GENERAL PHARMACY W/ HCPCS (ALT 636 FOR OP/ED): Performed by: STUDENT IN AN ORGANIZED HEALTH CARE EDUCATION/TRAINING PROGRAM

## 2025-01-11 PROCEDURE — 83540 ASSAY OF IRON: CPT | Performed by: INTERNAL MEDICINE

## 2025-01-11 PROCEDURE — 82374 ASSAY BLOOD CARBON DIOXIDE: CPT | Performed by: STUDENT IN AN ORGANIZED HEALTH CARE EDUCATION/TRAINING PROGRAM

## 2025-01-11 PROCEDURE — 2500000005 HC RX 250 GENERAL PHARMACY W/O HCPCS

## 2025-01-11 PROCEDURE — 86738 MYCOPLASMA ANTIBODY: CPT | Performed by: STUDENT IN AN ORGANIZED HEALTH CARE EDUCATION/TRAINING PROGRAM

## 2025-01-11 PROCEDURE — 76770 US EXAM ABDO BACK WALL COMP: CPT | Performed by: RADIOLOGY

## 2025-01-11 PROCEDURE — 2500000001 HC RX 250 WO HCPCS SELF ADMINISTERED DRUGS (ALT 637 FOR MEDICARE OP): Performed by: STUDENT IN AN ORGANIZED HEALTH CARE EDUCATION/TRAINING PROGRAM

## 2025-01-11 PROCEDURE — 99222 1ST HOSP IP/OBS MODERATE 55: CPT | Performed by: PHYSICIAN ASSISTANT

## 2025-01-11 PROCEDURE — 82306 VITAMIN D 25 HYDROXY: CPT | Mod: WESLAB | Performed by: INTERNAL MEDICINE

## 2025-01-11 PROCEDURE — 2500000004 HC RX 250 GENERAL PHARMACY W/ HCPCS (ALT 636 FOR OP/ED): Performed by: NURSE PRACTITIONER

## 2025-01-11 PROCEDURE — 82728 ASSAY OF FERRITIN: CPT | Performed by: INTERNAL MEDICINE

## 2025-01-11 PROCEDURE — 87899 AGENT NOS ASSAY W/OPTIC: CPT | Mod: WESLAB | Performed by: STUDENT IN AN ORGANIZED HEALTH CARE EDUCATION/TRAINING PROGRAM

## 2025-01-11 PROCEDURE — 84145 PROCALCITONIN (PCT): CPT | Mod: WESLAB | Performed by: STUDENT IN AN ORGANIZED HEALTH CARE EDUCATION/TRAINING PROGRAM

## 2025-01-11 PROCEDURE — 82947 ASSAY GLUCOSE BLOOD QUANT: CPT

## 2025-01-11 PROCEDURE — 82436 ASSAY OF URINE CHLORIDE: CPT | Performed by: INTERNAL MEDICINE

## 2025-01-11 PROCEDURE — 82607 VITAMIN B-12: CPT | Mod: WESLAB | Performed by: INTERNAL MEDICINE

## 2025-01-11 PROCEDURE — 99233 SBSQ HOSP IP/OBS HIGH 50: CPT | Performed by: NURSE PRACTITIONER

## 2025-01-11 PROCEDURE — 84484 ASSAY OF TROPONIN QUANT: CPT | Performed by: STUDENT IN AN ORGANIZED HEALTH CARE EDUCATION/TRAINING PROGRAM

## 2025-01-11 PROCEDURE — 2500000005 HC RX 250 GENERAL PHARMACY W/O HCPCS: Performed by: STUDENT IN AN ORGANIZED HEALTH CARE EDUCATION/TRAINING PROGRAM

## 2025-01-11 PROCEDURE — 94660 CPAP INITIATION&MGMT: CPT

## 2025-01-11 PROCEDURE — 82746 ASSAY OF FOLIC ACID SERUM: CPT | Mod: WESLAB | Performed by: INTERNAL MEDICINE

## 2025-01-11 PROCEDURE — 83970 ASSAY OF PARATHORMONE: CPT | Mod: WESLAB | Performed by: INTERNAL MEDICINE

## 2025-01-11 PROCEDURE — 71250 CT THORAX DX C-: CPT

## 2025-01-11 PROCEDURE — 80048 BASIC METABOLIC PNL TOTAL CA: CPT | Performed by: STUDENT IN AN ORGANIZED HEALTH CARE EDUCATION/TRAINING PROGRAM

## 2025-01-11 RX ORDER — POLYETHYLENE GLYCOL 3350 17 G/17G
17 POWDER, FOR SOLUTION ORAL DAILY
Status: DISCONTINUED | OUTPATIENT
Start: 2025-01-11 | End: 2025-01-11 | Stop reason: SDUPTHER

## 2025-01-11 RX ORDER — SERTRALINE HYDROCHLORIDE 50 MG/1
50 TABLET, FILM COATED ORAL NIGHTLY
Status: DISPENSED | OUTPATIENT
Start: 2025-01-11

## 2025-01-11 RX ORDER — CEFTRIAXONE 1 G/50ML
1 INJECTION, SOLUTION INTRAVENOUS NIGHTLY
Status: DISCONTINUED | OUTPATIENT
Start: 2025-01-11 | End: 2025-01-11

## 2025-01-11 RX ORDER — SENNOSIDES 8.6 MG/1
8.6 TABLET ORAL NIGHTLY
Status: DISPENSED | OUTPATIENT
Start: 2025-01-11

## 2025-01-11 RX ORDER — ASPIRIN 81 MG/1
81 TABLET ORAL DAILY
Status: DISPENSED | OUTPATIENT
Start: 2025-01-11

## 2025-01-11 RX ORDER — PANTOPRAZOLE SODIUM 40 MG/1
40 TABLET, DELAYED RELEASE ORAL
Status: DISPENSED | OUTPATIENT
Start: 2025-01-11

## 2025-01-11 RX ORDER — ONDANSETRON 4 MG/1
4 TABLET, FILM COATED ORAL EVERY 8 HOURS PRN
Status: ACTIVE | OUTPATIENT
Start: 2025-01-11

## 2025-01-11 RX ORDER — ACETAMINOPHEN 325 MG/1
650 TABLET ORAL EVERY 4 HOURS PRN
Status: ACTIVE | OUTPATIENT
Start: 2025-01-11

## 2025-01-11 RX ORDER — METOPROLOL SUCCINATE 50 MG/1
50 TABLET, EXTENDED RELEASE ORAL DAILY
Status: DISPENSED | OUTPATIENT
Start: 2025-01-11

## 2025-01-11 RX ORDER — PANTOPRAZOLE SODIUM 40 MG/10ML
40 INJECTION, POWDER, LYOPHILIZED, FOR SOLUTION INTRAVENOUS
Status: ACTIVE | OUTPATIENT
Start: 2025-01-11

## 2025-01-11 RX ORDER — ONDANSETRON HYDROCHLORIDE 2 MG/ML
4 INJECTION, SOLUTION INTRAVENOUS EVERY 8 HOURS PRN
Status: ACTIVE | OUTPATIENT
Start: 2025-01-11

## 2025-01-11 RX ORDER — POLYETHYLENE GLYCOL 3350 17 G/17G
17 POWDER, FOR SOLUTION ORAL DAILY
Status: ACTIVE | OUTPATIENT
Start: 2025-01-11

## 2025-01-11 RX ORDER — TALC
3 POWDER (GRAM) TOPICAL NIGHTLY PRN
Status: DISPENSED | OUTPATIENT
Start: 2025-01-11

## 2025-01-11 RX ORDER — AMLODIPINE BESYLATE 5 MG/1
5 TABLET ORAL DAILY
Status: DISPENSED | OUTPATIENT
Start: 2025-01-11

## 2025-01-11 RX ORDER — ENOXAPARIN SODIUM 100 MG/ML
30 INJECTION SUBCUTANEOUS DAILY
Status: DISPENSED | OUTPATIENT
Start: 2025-01-11

## 2025-01-11 RX ORDER — FUROSEMIDE 10 MG/ML
40 INJECTION INTRAMUSCULAR; INTRAVENOUS EVERY 12 HOURS
Status: DISPENSED | OUTPATIENT
Start: 2025-01-11 | End: 2025-01-14

## 2025-01-11 RX ORDER — ACETAMINOPHEN 650 MG/1
650 SUPPOSITORY RECTAL EVERY 4 HOURS PRN
Status: ACTIVE | OUTPATIENT
Start: 2025-01-11

## 2025-01-11 RX ORDER — IPRATROPIUM BROMIDE AND ALBUTEROL SULFATE 2.5; .5 MG/3ML; MG/3ML
3 SOLUTION RESPIRATORY (INHALATION) 4 TIMES DAILY PRN
Status: ACTIVE | OUTPATIENT
Start: 2025-01-11

## 2025-01-11 RX ORDER — MIDODRINE HYDROCHLORIDE 5 MG/1
5 TABLET ORAL ONCE
Status: COMPLETED | OUTPATIENT
Start: 2025-01-11 | End: 2025-01-11

## 2025-01-11 RX ORDER — NORTRIPTYLINE HYDROCHLORIDE 10 MG/1
10 CAPSULE ORAL NIGHTLY
Status: DISPENSED | OUTPATIENT
Start: 2025-01-11

## 2025-01-11 RX ORDER — LISINOPRIL 20 MG/1
20 TABLET ORAL DAILY
Status: ACTIVE | OUTPATIENT
Start: 2025-01-11

## 2025-01-11 RX ORDER — ACETAMINOPHEN 160 MG/5ML
650 SOLUTION ORAL EVERY 4 HOURS PRN
Status: ACTIVE | OUTPATIENT
Start: 2025-01-11

## 2025-01-11 RX ADMIN — SERTRALINE 50 MG: 50 TABLET, FILM COATED ORAL at 21:33

## 2025-01-11 RX ADMIN — MIDODRINE HYDROCHLORIDE 5 MG: 5 TABLET ORAL at 22:55

## 2025-01-11 RX ADMIN — ENOXAPARIN SODIUM 30 MG: 30 INJECTION SUBCUTANEOUS at 08:53

## 2025-01-11 RX ADMIN — METOPROLOL SUCCINATE 50 MG: 50 TABLET, EXTENDED RELEASE ORAL at 08:54

## 2025-01-11 RX ADMIN — Medication 3 MG: at 21:33

## 2025-01-11 RX ADMIN — Medication 3 L/MIN: at 22:15

## 2025-01-11 RX ADMIN — Medication 21 PERCENT: at 08:34

## 2025-01-11 RX ADMIN — NORTRIPTYLINE HYDROCHLORIDE 10 MG: 10 CAPSULE ORAL at 22:01

## 2025-01-11 RX ADMIN — ASPIRIN 81 MG: 81 TABLET, COATED ORAL at 08:54

## 2025-01-11 RX ADMIN — SENNOSIDES 8.6 MG: 8.6 TABLET, FILM COATED ORAL at 21:33

## 2025-01-11 RX ADMIN — FUROSEMIDE 40 MG: 10 INJECTION, SOLUTION INTRAMUSCULAR; INTRAVENOUS at 08:54

## 2025-01-11 RX ADMIN — AMLODIPINE BESYLATE 5 MG: 5 TABLET ORAL at 08:54

## 2025-01-11 SDOH — ECONOMIC STABILITY: HOUSING INSECURITY: AT ANY TIME IN THE PAST 12 MONTHS, WERE YOU HOMELESS OR LIVING IN A SHELTER (INCLUDING NOW)?: PATIENT DECLINED

## 2025-01-11 SDOH — SOCIAL STABILITY: SOCIAL INSECURITY: ARE YOU MARRIED, WIDOWED, DIVORCED, SEPARATED, NEVER MARRIED, OR LIVING WITH A PARTNER?: PATIENT DECLINED

## 2025-01-11 SDOH — SOCIAL STABILITY: SOCIAL NETWORK: IN A TYPICAL WEEK, HOW MANY TIMES DO YOU TALK ON THE PHONE WITH FAMILY, FRIENDS, OR NEIGHBORS?: PATIENT DECLINED

## 2025-01-11 SDOH — ECONOMIC STABILITY: HOUSING INSECURITY: IN THE LAST 12 MONTHS, WAS THERE A TIME WHEN YOU WERE NOT ABLE TO PAY THE MORTGAGE OR RENT ON TIME?: PATIENT DECLINED

## 2025-01-11 SDOH — SOCIAL STABILITY: SOCIAL NETWORK: HOW OFTEN DO YOU ATTEND MEETINGS OF THE CLUBS OR ORGANIZATIONS YOU BELONG TO?: PATIENT DECLINED

## 2025-01-11 SDOH — HEALTH STABILITY: PHYSICAL HEALTH: ON AVERAGE, HOW MANY MINUTES DO YOU ENGAGE IN EXERCISE AT THIS LEVEL?: PATIENT DECLINED

## 2025-01-11 SDOH — ECONOMIC STABILITY: FOOD INSECURITY: HOW HARD IS IT FOR YOU TO PAY FOR THE VERY BASICS LIKE FOOD, HOUSING, MEDICAL CARE, AND HEATING?: PATIENT DECLINED

## 2025-01-11 SDOH — ECONOMIC STABILITY: FOOD INSECURITY
WITHIN THE PAST 12 MONTHS, YOU WORRIED THAT YOUR FOOD WOULD RUN OUT BEFORE YOU GOT THE MONEY TO BUY MORE.: PATIENT DECLINED

## 2025-01-11 SDOH — SOCIAL STABILITY: SOCIAL INSECURITY
WITHIN THE LAST YEAR, HAVE YOU BEEN HUMILIATED OR EMOTIONALLY ABUSED IN OTHER WAYS BY YOUR PARTNER OR EX-PARTNER?: PATIENT DECLINED

## 2025-01-11 SDOH — ECONOMIC STABILITY: FOOD INSECURITY: WITHIN THE PAST 12 MONTHS, THE FOOD YOU BOUGHT JUST DIDN'T LAST AND YOU DIDN'T HAVE MONEY TO GET MORE.: PATIENT DECLINED

## 2025-01-11 SDOH — HEALTH STABILITY: MENTAL HEALTH: HOW MANY DRINKS CONTAINING ALCOHOL DO YOU HAVE ON A TYPICAL DAY WHEN YOU ARE DRINKING?: PATIENT DECLINED

## 2025-01-11 SDOH — ECONOMIC STABILITY: INCOME INSECURITY
IN THE PAST 12 MONTHS HAS THE ELECTRIC, GAS, OIL, OR WATER COMPANY THREATENED TO SHUT OFF SERVICES IN YOUR HOME?: PATIENT DECLINED

## 2025-01-11 SDOH — HEALTH STABILITY: MENTAL HEALTH: HOW OFTEN DO YOU HAVE SIX OR MORE DRINKS ON ONE OCCASION?: PATIENT DECLINED

## 2025-01-11 SDOH — SOCIAL STABILITY: SOCIAL INSECURITY
WITHIN THE LAST YEAR, HAVE YOU BEEN KICKED, HIT, SLAPPED, OR OTHERWISE PHYSICALLY HURT BY YOUR PARTNER OR EX-PARTNER?: PATIENT DECLINED

## 2025-01-11 SDOH — HEALTH STABILITY: PHYSICAL HEALTH
ON AVERAGE, HOW MANY DAYS PER WEEK DO YOU ENGAGE IN MODERATE TO STRENUOUS EXERCISE (LIKE A BRISK WALK)?: PATIENT DECLINED

## 2025-01-11 SDOH — HEALTH STABILITY: MENTAL HEALTH
DO YOU FEEL STRESS - TENSE, RESTLESS, NERVOUS, OR ANXIOUS, OR UNABLE TO SLEEP AT NIGHT BECAUSE YOUR MIND IS TROUBLED ALL THE TIME - THESE DAYS?: PATIENT DECLINED

## 2025-01-11 SDOH — SOCIAL STABILITY: SOCIAL NETWORK
DO YOU BELONG TO ANY CLUBS OR ORGANIZATIONS SUCH AS CHURCH GROUPS, UNIONS, FRATERNAL OR ATHLETIC GROUPS, OR SCHOOL GROUPS?: PATIENT DECLINED

## 2025-01-11 SDOH — HEALTH STABILITY: PHYSICAL HEALTH
HOW OFTEN DO YOU NEED TO HAVE SOMEONE HELP YOU WHEN YOU READ INSTRUCTIONS, PAMPHLETS, OR OTHER WRITTEN MATERIAL FROM YOUR DOCTOR OR PHARMACY?: PATIENT DECLINES TO RESPOND

## 2025-01-11 SDOH — SOCIAL STABILITY: SOCIAL NETWORK: HOW OFTEN DO YOU ATTEND CHURCH OR RELIGIOUS SERVICES?: PATIENT DECLINED

## 2025-01-11 SDOH — SOCIAL STABILITY: SOCIAL INSECURITY
WITHIN THE LAST YEAR, HAVE YOU BEEN RAPED OR FORCED TO HAVE ANY KIND OF SEXUAL ACTIVITY BY YOUR PARTNER OR EX-PARTNER?: PATIENT DECLINED

## 2025-01-11 SDOH — ECONOMIC STABILITY: TRANSPORTATION INSECURITY
IN THE PAST 12 MONTHS, HAS LACK OF TRANSPORTATION KEPT YOU FROM MEDICAL APPOINTMENTS OR FROM GETTING MEDICATIONS?: PATIENT DECLINED

## 2025-01-11 SDOH — HEALTH STABILITY: MENTAL HEALTH: HOW OFTEN DO YOU HAVE A DRINK CONTAINING ALCOHOL?: PATIENT DECLINED

## 2025-01-11 SDOH — SOCIAL STABILITY: SOCIAL INSECURITY: WITHIN THE LAST YEAR, HAVE YOU BEEN AFRAID OF YOUR PARTNER OR EX-PARTNER?: PATIENT DECLINED

## 2025-01-11 SDOH — SOCIAL STABILITY: SOCIAL NETWORK: HOW OFTEN DO YOU GET TOGETHER WITH FRIENDS OR RELATIVES?: PATIENT DECLINED

## 2025-01-11 ASSESSMENT — LIFESTYLE VARIABLES
SKIP TO QUESTIONS 9-10: 0
AUDIT-C TOTAL SCORE: -1

## 2025-01-11 ASSESSMENT — COGNITIVE AND FUNCTIONAL STATUS - GENERAL
MOBILITY SCORE: 23
DAILY ACTIVITIY SCORE: 23
HELP NEEDED FOR BATHING: A LITTLE
CLIMB 3 TO 5 STEPS WITH RAILING: A LITTLE

## 2025-01-11 ASSESSMENT — PAIN SCALES - GENERAL
PAINLEVEL_OUTOF10: 0 - NO PAIN

## 2025-01-11 ASSESSMENT — ACTIVITIES OF DAILY LIVING (ADL): LACK_OF_TRANSPORTATION: PATIENT DECLINED

## 2025-01-11 NOTE — PROGRESS NOTES
Reid Cano is a 89 y.o. male on day 1 of admission presenting with Congestive heart failure, unspecified HF chronicity, unspecified heart failure type.       01/11/25 1409   ACS Disability Status   Are you deaf or do you have serious difficulty hearing? Y   Are you blind or do you have serious difficulty seeing, even when wearing glasses? N   Because of a physical, mental, or emotional condition, do you have serious difficulty concentrating, remembering, or making decisions? (5 years old or older) Y   Do you have serious difficulty walking or climbing stairs? N   Do you have serious difficulty dressing or bathing? Y   Because of a physical, mental, or emotional condition, do you have serious difficulty doing errands alone such as visiting the doctor? Y         Rossy Mcghee RN

## 2025-01-11 NOTE — ASSESSMENT & PLAN NOTE
Intake blood pressure 148/73, acceptable  Lisinopril 20 on hold for JAYDEN  Continue to monitor.

## 2025-01-11 NOTE — ASSESSMENT & PLAN NOTE
CT imaging as above, received Rocephin and azithromycin in the ED.  Continue empiric IV antibiotics.  Pneumonia labs pending, procalcitonin pending, sputum culture pending  Follow blood culture

## 2025-01-11 NOTE — ASSESSMENT & PLAN NOTE
Secondary to acute CHF and pneumonia.  Continue supplemental oxygen, wean as tolerable.  Respiratory therapy  Bronchodilators.

## 2025-01-11 NOTE — ASSESSMENT & PLAN NOTE
Chest x-ray cardiomegaly, moderately large left and small right pleural effusion as well as possible small infrahilar alveolar infiltrate on the right.  CT chest showed bilateral pleural effusions with interstitial infiltrates all suggestive of CHF  BNP 3620  Lasix 40 mg IV twice daily  Echocardiogram pending  Cardiology consult

## 2025-01-11 NOTE — CONSULTS
Inpatient consult to Cardiology  Consult performed by: Vivi Wharton PA-C  Consult ordered by: Ruperto Theodore MD  Reason for consult: Acute congestive heart failure, elevated pro BNP        History Of Present Illness:    Reid Cano is a 89 y.o. male presenting with a PMH Kaposi sarcoma, hypertension, hyperlipidemia, CKD 3, aortic stenosis presenting brought in by EMS for evaluation of shortness of breath.   Patient arrived by EMS reports feeling short of breath at 6 PM last night, denies any chest pain, dizziness, fevers.  Patient reports a mild cough.  Upon arrival to the ED oxygen saturation 82% on 4 L nasal cannula.  Patient speaks Italian, Sinhala, and some English. Previously follow cardiology w Dr. Zach Royal.      Last Recorded Vitals:  Vitals:    01/10/25 1943 01/10/25 2049 01/11/25 0300 01/11/25 0430   BP:   131/54 131/62   Pulse:   58 60   Resp:  20 17 17   Temp: 36.7 °C (98.1 °F)      TempSrc: Axillary      SpO2:  99% 96% (!) 92%   Weight:       Height:           Last Labs:  CBC - 1/10/2025:  7:29 PM  12.1 11.8 311    35.8      CMP - 1/10/2025:  7:29 PM  9.0 7.6 12 --- 0.7   _ 4.0 6 23      PTT - 1/10/2025:  7:29 PM  1.0   10.8 24.9     Troponin I, High Sensitivity   Date/Time Value Ref Range Status   01/11/2025 04:23  (HH) 0 - 20 ng/L Final     Comment:     Previous result verified on 1/10/2025 2022 on specimen/case [id number] called with component [facility] for procedure Troponin I, High Sensitivity, Initial with value 170 ng/L.   01/11/2025 12:56  (HH) 0 - 20 ng/L Final     Comment:     Previous result verified on 1/10/2025 2022 on specimen/case [id number] called with component [facility] for procedure Troponin I, High Sensitivity, Initial with value 170 ng/L.   01/10/2025 09:02  (HH) 0 - 20 ng/L Final     Comment:     Previous result verified on 1/10/2025 2022 on specimen/case [id number] called with component [facility] for procedure Troponin I, High Sensitivity, Initial  with value 170 ng/L.     BNP   Date/Time Value Ref Range Status   01/10/2025 07:29 PM 3,620 (H) 0 - 99 pg/mL Final   03/04/2020 05:12  (H) 0 - 99 pg/mL Final     Comment:     .  <100 pg/mL - Heart failure unlikely  100-299 pg/mL - Intermediate probability of acute heart  .               failure exacerbation. Correlate with clinical  .               context and patient history.    >=300 pg/mL - Heart Failure likely. Correlate with clinical  .               context and patient history.  BNP testing is performed using different testing   methodology at [facility] than at other   [facility]. Direct result comparisons should   only be made within the same method.       Hemoglobin A1C   Date/Time Value Ref Range Status   11/29/2018 07:54 AM 5.0 % Final     Comment:          Diagnosis of Diabetes-Adults   Non-Diabetic: < or = 5.6%   Increased risk for developing diabetes: 5.7-6.4%   Diagnostic of diabetes: > or = 6.5%  .       Monitoring of Diabetes                Age (y)     Therapeutic Goal (%)   Adults:          >18           <7.0   Pediatrics:    13-18           <7.5                   7-12           <8.0                   0- 6            7.5-8.5   American Diabetes Association. Diabetes Care 33(S1), Jan 2010.     06/16/2018 07:37 AM 5.0 % Final     Comment:          Diagnosis of Diabetes-Adults   Non-Diabetic: < or = 5.6%   Increased risk for developing diabetes: 5.7-6.4%   Diagnostic of diabetes: > or = 6.5%  .       Monitoring of Diabetes                Age (y)     Therapeutic Goal (%)   Adults:          >18           <7.0   Pediatrics:    13-18           <7.5                   7-12           <8.0                   0- 6            7.5-8.5   American Diabetes Association. Diabetes Care 33(S1), Jan 2010.       VLDL   Date/Time Value Ref Range Status   03/30/2023 10:05 AM 13 0 - 40 mg/dL Final   08/28/2019 07:38 AM 18 0 - 40 mg/dL Final   11/29/2018 07:53 AM 15 0 - 40 mg/dL Final      Last  I/O:  I/O last 3 completed shifts:  In: 300 (4.5 mL/kg) [IV Piggyback:300]  Out: 300 (4.5 mL/kg) [Urine:300 (0.1 mL/kg/hr)]  Weight: 67.1 kg     Past Cardiology Tests (Last 3 Years):  EKG:  ECG 12 Lead 11/21/2024      Echo:  No results found for this or any previous visit from the past 1095 days.      Past Medical History:  He has a past medical history of JAYDEN (acute kidney injury) (CMS-HCC) (01/02/2024), Closed nondisplaced fracture of first cervical vertebra (01/26/2024), Dens fracture, closed, initial encounter (Multi) (01/01/2024), Dysphagia (01/26/2024), Edema of both lower legs (08/21/2023), Fall (on) (from) other stairs and steps, initial encounter (01/26/2024), Hip pain, right (12/19/2011), Multiple skin tears (01/02/2024), Onychomycosis (08/21/2023), Personal history of malignant neoplasm, unspecified (02/06/2017), Personal history of other diseases of the circulatory system, and Pre-ulcerative calluses (08/21/2023).    Past Surgical History:  He has no past surgical history on file.      Social History:  He reports that he has quit smoking. His smoking use included cigarettes. He has never used smokeless tobacco. He reports current alcohol use. He reports that he does not use drugs.    Family History:  Family History   Problem Relation Name Age of Onset    Colon cancer Mother      Colon cancer Father      Colon cancer Brother          Allergies:  Patient has no known allergies.    Inpatient Medications:  Scheduled medications   Medication Dose Route Frequency    amLODIPine  5 mg oral Daily    aspirin  81 mg oral Daily    azithromycin  500 mg intravenous Nightly    cefTRIAXone  1 g intravenous Nightly    enoxaparin  30 mg subcutaneous Daily    furosemide  40 mg intravenous q12h    [Held by provider] lisinopril  20 mg oral Daily    metoprolol succinate XL  50 mg oral Daily    nortriptyline  10 mg oral Nightly    oxygen   inhalation Continuous - Inhalation    pantoprazole  40 mg oral Daily before breakfast     Or    pantoprazole  40 mg intravenous Daily before breakfast    polyethylene glycol  17 g oral Daily    sennosides  8.6 mg oral Nightly    sertraline  50 mg oral Nightly     PRN medications   Medication    acetaminophen    Or    acetaminophen    Or    acetaminophen    ipratropium-albuteroL    melatonin    ondansetron    Or    ondansetron     Continuous Medications   Medication Dose Last Rate     Outpatient Medications:  Current Outpatient Medications   Medication Instructions    amLODIPine (NORVASC) 5 mg, oral, Daily    aspirin 81 mg EC tablet 1 tablet, Daily    bacitracin 500 unit/gram ointment Daily RT    cholecalciferol (Vitamin D-3) 125 MCG (5000 UT) capsule 1 capsule, Daily    docusate sodium (COLACE) 100 mg, 2 times daily    lisinopril 20 mg, oral, Daily    metoprolol succinate XL (TOPROL-XL) 50 mg, oral, Daily    nortriptyline (PAMELOR) 10 mg, oral, Nightly    potassium chloride CR 20 mEq ER tablet 20 mEq, oral, Daily    sennosides (SENOKOT) 8.6 mg, 2 times daily    sertraline (ZOLOFT) 50 mg, oral, Nightly       Physical Exam:  Appearance: Alert, oriented, cooperative, in no acute distress.     Skin: Purpuric skin lesions on lower extremities    Eyes: Conjunctiva pink with no redness or exudates. Eyelids without lesions. No scleral icterus.     ENT: Hearing grossly intact. External inspection of ears without lesions or erythema. no nasal flaring. no tripoding, no drooling, no difficulty swallowing oral secretions.  Nasal cannula    Pulmonary: Decreased breath sounds in all lobes. No accessory muscle use or stridor. No difficulty completing a full sentence without taking a breath    Cardiac: Murmur heard best left sternal border, regular rhythm    Musculoskeletal: Bilateral lower extremity edema, pitting +1    Neurological: no focal findings identified.    Psychiatric: Appropriate mood and affect.       Assessment/Plan     Hypertension  Chronic kidney disease stage III   Acute kidney injury  Elevated  troponin  Pneumonia  Acute on Chronic Diastolic Congestive heart failure  Pleural effusion  Acute hypoxic respiratory failure      Reid Cano is a 89 y.o. male presenting with a PMH Kaposi sarcoma, hypertension, hyperlipidemia, CKD 3, aortic stenosis presenting brought in by EMS for evaluation of shortness of breath.  Patient's oxygen saturation upon arrival to the ER 82% on 4 L of nasal cannula, /73, troponins elevated 263, mildly elevated from first arrival, 170, sodium 134, potassium 3.9, creatinine 1.52, GFR 42, BNP 3,620.  Chest x-ray in the ER shows cardiomegaly, moderately  large left and small right pleural effusion as well as small infrahilar infiltrate on the right side.  Patient was given ceftriaxone, azithromycin, aspirin and Lasix 40 mg IV in the ER.  Patient is a former smoker, currently drinks alcohol, denies any illicit drug use.  EKG shows normal sinus rhythm, ST abnormalities, LBBB.  Patient's last echocardiogram 3/2020, EF 55-60%, borderline increased left ventricular mass, impaired relaxation of left ventricle diastolic filling, dilation of left atrium, severe aortic stenosis, moderate to severe aortic valve cusp calcification, mild to moderate aortic regurgitation. his morning patient denies any shortness of breath, chest pain, dizziness at this time, and appears comfortable laying in bed. We were  consulted due to acute congestive diastolic heart failure, and elevated proBNP.  Elevated troponin at this time considered likely due to acute hypoxic respiratory failure/acute congestive diastolic heart failure. Echocardiogram has been ordered, further recommendations pending echo result.  Patient will continue with diuresis management by nephrology. We will continue to follow this patient with you       Code Status:  Full Code    I spent 60 minutes in the professional and overall care of this patient.        Vivi Wharton PA-C

## 2025-01-11 NOTE — PROGRESS NOTES
Reid Cano is a 89 y.o. male on day 1 of admission presenting with Congestive heart failure, unspecified HF chronicity, unspecified heart failure type.      Subjective   Patient states his breathing is a little better today.  The patient was noted to be very hard of hearing.  He denies chest pain or abdominal pain.       Objective     Last Recorded Vitals  /66 (BP Location: Right arm, Patient Position: Lying)   Pulse 74   Temp 36.7 °C (98.1 °F) (Axillary)   Resp 20   Wt 67.1 kg (147 lb 14.9 oz)   SpO2 98%   Intake/Output last 3 Shifts:    Intake/Output Summary (Last 24 hours) at 1/11/2025 1014  Last data filed at 1/11/2025 0017  Gross per 24 hour   Intake 300 ml   Output 300 ml   Net 0 ml       Admission Weight  Weight: 67.1 kg (147 lb 14.9 oz) (01/10/25 1937)    Daily Weight  01/10/25 : 67.1 kg (147 lb 14.9 oz)          Physical Exam  Constitutional: No acute distress, calm, cooperative  HEENT: PERRL, normocephalic, atraumatic, mucous membranes moist  Cardiovascular: Regular rhythm and rate, systolic murmur  Respiratory: Lungs clear to auscultation, decreased bilaterally  Gastrointestinal: Bowel sounds positive x 4, soft, nontender  Neurologic: Alert and oriented x 3 equal strength bilaterally  Musculoskeletal: Able to move all extremities 2+ pitting lower extremity edema bilaterally  Skin: Sarcoma lesions bilateral lower extremities               Assessment/Plan                  Assessment & Plan  Congestive heart failure, unspecified HF chronicity, unspecified heart failure type  Chest x-ray cardiomegaly, moderately large left and small right pleural effusion as well as possible small infrahilar alveolar infiltrate on the right.  CT chest showed bilateral pleural effusions with interstitial infiltrates all suggestive of CHF  BNP 3620  Lasix 40 mg IV twice daily  Echocardiogram pending  Cardiology consult    Pneumonia  Currently on Rocephin and azithromycin  Sputum culture pending  Follow blood  cultures  Acute hypoxic respiratory failure (Multi)  Secondary to acute CHF and pneumonia.  Continue supplemental oxygen, wean as tolerable.  Respiratory therapy  Bronchodilators.    Troponin I above reference range  Likely due to acute hypoxic respiratory failure  Cardiology is following  Acute kidney injury superimposed on CKD (CMS-HCC)  Avoid nephrotoxic agents  Holding lisinopril 20  Nephrology following  Kaposi sarcoma (Multi)  Chronic, present on lower extremities  Essential hypertension, benign  Hold lisinopril  Continue home amlodipine  DVT prophylaxis  Lovenox  Disposition  SNF for rehab versus home with home health care once cleared  Care coordination has been consulted                BLAKE Francisco-CNP

## 2025-01-11 NOTE — PROGRESS NOTES
Reid Cano is a 89 y.o. male on day 1 of admission presenting with Congestive heart failure, unspecified HF chronicity, unspecified heart failure type.       01/11/25 1407   Discharge Planning   Living Arrangements Alone   Support Systems Children   Assistance Needed to be determined   Type of Residence Private residence   Number of Stairs to Enter Residence 3   Number of Stairs Within Residence 1  (flight to basement)   Do you have animals or pets at home? No   Who is requesting discharge planning? Provider   Home or Post Acute Services In home services   Type of Home Care Services Home nursing visits;Home OT;Home PT   Expected Discharge Disposition Home H   Does the patient need discharge transport arranged? No   Patient Choice   Provider Choice list and CMS website (https://medicare.gov/care-compare#search) for post-acute Quality and Resource Measure Data were provided and reviewed with: Family   Patient / Family choosing to utilize agency / facility established prior to hospitalization No   Stroke Family Assessment   Stroke Family Assessment Needed No         Rossy Mcghee RN

## 2025-01-11 NOTE — PROGRESS NOTES
Reid Cano is a 89 y.o. male on day 1 of admission presenting with Congestive heart failure, unspecified HF chronicity, unspecified heart failure type.    Patient agitated, unable to conduct assessment.     01/11/25 1404   Physical Activity   On average, how many days per week do you engage in moderate to strenuous exercise (like a brisk walk)? Pt Declined   On average, how many minutes do you engage in exercise at this level? Pt Declined   Financial Resource Strain   How hard is it for you to pay for the very basics like food, housing, medical care, and heating? Pt Declined   Housing Stability   In the last 12 months, was there a time when you were not able to pay the mortgage or rent on time? Pt Declined   At any time in the past 12 months, were you homeless or living in a shelter (including now)? Pt Declined   Transportation Needs   In the past 12 months, has lack of transportation kept you from medical appointments or from getting medications? Pt Declined   In the past 12 months, has lack of transportation kept you from meetings, work, or from getting things needed for daily living? Pt Declined   Food Insecurity   Within the past 12 months, you worried that your food would run out before you got the money to buy more. Pt Declined   Within the past 12 months, the food you bought just didn't last and you didn't have money to get more. Pt Declined   Stress   Do you feel stress - tense, restless, nervous, or anxious, or unable to sleep at night because your mind is troubled all the time - these days? Pt Declined   Social Connections   In a typical week, how many times do you talk on the phone with family, friends, or neighbors? Pt Declined   How often do you get together with friends or relatives? Pt Declined   How often do you attend Bahai or Yazidi services? Pt Declined   Do you belong to any clubs or organizations such as Bahai groups, unions, fraternal or athletic groups, or school groups? Pt Declined    How often do you attend meetings of the clubs or organizations you belong to? Pt Declined   Are you , , , , never , or living with a partner? Pt Declined   Intimate Partner Violence   Within the last year, have you been afraid of your partner or ex-partner? Pt Declined   Within the last year, have you been humiliated or emotionally abused in other ways by your partner or ex-partner? Pt Declined   Within the last year, have you been kicked, hit, slapped, or otherwise physically hurt by your partner or ex-partner? Pt Declined   Within the last year, have you been raped or forced to have any kind of sexual activity by your partner or ex-partner? Pt Declined   Alcohol Use   Q1: How often do you have a drink containing alcohol? Pt Declined   Q2: How many drinks containing alcohol do you have on a typical day when you are drinking? Pt Declined   Q3: How often do you have six or more drinks on one occasion? Pt Declined   Utilities   In the past 12 months has the electric, gas, oil, or water company threatened to shut off services in your home? Pt Declined   Health Literacy   How often do you need to have someone help you when you read instructions, pamphlets, or other written material from your doctor or pharmacy? Patient declines to respond         Rossy Mcghee RN

## 2025-01-11 NOTE — H&P
"History Of Present Illness  Reid Cano is a 89 y.o. male presenting with shortness of breath.    This is a 89-year-old male with past medical history of HTN, HLD, CKD 3, AS, Kaposi's sarcoma and other comorbidities presented to the ED was found to saturating 82% on 4 L oxygen upon arrival.  Patient does not speak English well, he speaks [place] and Faroese.  Per EMS his shortness of breath started around 6 PM he had mentioned \" he is going to die\" a couple times today.  He also was anxious at that time.  Patient denies chest pain, abdominal pain, fever or chills.    In the ED on admission vitals notable for respiratory rate 31, /73, saturating 100% on BiPAP.  After 2 hours of BiPAP patient was taken down to 4 L and stable at 97%.  He was also given Rocephin and azithromycin in the ED, aspirin 324, Lasix 40 IV.    Labs notable for glucose 150, sodium 134, bicarb 20, BUN 25, CR 1.52, EGFR 42, BNP 3620, troponin 170->195->255.  CBC notable for WBC 12.1, hemoglobin 11.8, HCT 35.8,    Admitted for acute CHF and JAYDEN.     Past Medical History  Past Medical History:   Diagnosis Date    JAYDEN (acute kidney injury) (CMS-[facility]) 01/02/2024    Closed nondisplaced fracture of first cervical vertebra 01/26/2024    Dens fracture, closed, initial encounter (Multi) 01/01/2024    Dysphagia 01/26/2024    Edema of both lower legs 08/21/2023    Fall (on) (from) other stairs and steps, initial encounter 01/26/2024    Hip pain, right 12/19/2011    Multiple skin tears 01/02/2024    Onychomycosis 08/21/2023    Personal history of malignant neoplasm, unspecified 02/06/2017    History of Kaposi's sarcoma    Personal history of other diseases of the circulatory system     History of cardiac murmur    Pre-ulcerative calluses 08/21/2023       Surgical History  No past surgical history on file.     Social History  He reports that he has quit smoking. His smoking use included cigarettes. He has never used smokeless tobacco. He reports current " "alcohol use. He reports that he does not use drugs.    Family History  Family History   Problem Relation Name Age of Onset    Colon cancer Mother      Colon cancer Father      Colon cancer Brother          Allergies  Patient has no known allergies.    Review of Systems  General: no fatigue, no malaise, no fevers/chills   HENT: no rhinorrhea, no sore throat, no ear pain   Eyes: no change in vision, denies eye pain or discharge   Lungs: SOB, no cough, no hemoptysis   CV: no chest pain, no palpitations, leg edema   Abd: no nausea/vomiting, no constipation/diarrhea, no abdominal pain   : no dysuria, no frequency, no nocturia, no flank pain   Endocrine: no polydipsia/polyuria, no hot or cold intolerance   Neuro: no headaches, no syncope, no seizures   MSK: no back pain, no neck pain, no joint problems   Psych:  anxiety, no depression, no hallucinations      Physical Exam   General: alert, no diaphoresis   HENT: mucous membranes moist, external ears normal, no rhinorrhea   Eyes: no icterus or injection, no discharge   Lungs: Diffuse decreased bilateral breath sounds   Heart: RRR, systolic murmur, 2+ pitting LE edema BL   GI: abdomen soft, nontender, nondistended, BS present   MSK: no joint effusion or deformity   Skin: Kaposi's sarcoma lesions on bilateral lower extremity   Neuro: grossly normal cognition, motor strength, sensation    Last Recorded Vitals  Blood pressure 148/73, pulse 78, temperature 36.7 °C (98.1 °F), temperature source Axillary, resp. rate 20, height 1.676 m (5' 6\"), weight 67.1 kg (147 lb 14.9 oz), SpO2 99%.    Relevant Results  Scheduled medications  amLODIPine, 5 mg, oral, Daily  aspirin, 81 mg, oral, Daily  azithromycin, 500 mg, intravenous, q24h  cefTRIAXone, 1 g, intravenous, q24h  enoxaparin, 30 mg, subcutaneous, Daily  furosemide, 40 mg, intravenous, q12h  [Held by provider] lisinopril, 20 mg, oral, Daily  metoprolol succinate XL, 50 mg, oral, Daily  nortriptyline, 10 mg, oral, " Nightly  oxygen, , inhalation, Continuous - Inhalation  pantoprazole, 40 mg, oral, Daily before breakfast   Or  pantoprazole, 40 mg, intravenous, Daily before breakfast  polyethylene glycol, 17 g, oral, Daily  polyethylene glycol, 17 g, oral, Daily  sennosides, 8.6 mg, oral, Nightly  sertraline, 50 mg, oral, Nightly      Continuous medications     PRN medications  PRN medications: acetaminophen **OR** acetaminophen **OR** acetaminophen, melatonin, ondansetron **OR** ondansetron      Results for orders placed or performed during the hospital encounter of 01/10/25 (from the past 24 hours)   CBC and Auto Differential   Result Value Ref Range    WBC 12.1 (H) 4.4 - 11.3 x10*3/uL    nRBC 0.0 0.0 - 0.0 /100 WBCs    RBC 3.78 (L) 4.50 - 5.90 x10*6/uL    Hemoglobin 11.8 (L) 13.5 - 17.5 g/dL    Hematocrit 35.8 (L) 41.0 - 52.0 %    MCV 95 80 - 100 fL    MCH 31.2 26.0 - 34.0 pg    MCHC 33.0 32.0 - 36.0 g/dL    RDW 13.2 11.5 - 14.5 %    Platelets 311 150 - 450 x10*3/uL    Neutrophils % 63.5 40.0 - 80.0 %    Immature Granulocytes %, Automated 0.4 0.0 - 0.9 %    Lymphocytes % 28.2 13.0 - 44.0 %    Monocytes % 6.7 2.0 - 10.0 %    Eosinophils % 0.7 0.0 - 6.0 %    Basophils % 0.5 0.0 - 2.0 %    Neutrophils Absolute 7.68 (H) 1.60 - 5.50 x10*3/uL    Immature Granulocytes Absolute, Automated 0.05 0.00 - 0.50 x10*3/uL    Lymphocytes Absolute 3.41 (H) 0.80 - 3.00 x10*3/uL    Monocytes Absolute 0.81 (H) 0.05 - 0.80 x10*3/uL    Eosinophils Absolute 0.09 0.00 - 0.40 x10*3/uL    Basophils Absolute 0.06 0.00 - 0.10 x10*3/uL   Comprehensive metabolic panel   Result Value Ref Range    Glucose 150 (H) 74 - 99 mg/dL    Sodium 134 (L) 136 - 145 mmol/L    Potassium 3.9 3.5 - 5.3 mmol/L    Chloride 102 98 - 107 mmol/L    Bicarbonate 20 (L) 21 - 32 mmol/L    Anion Gap 16 10 - 20 mmol/L    Urea Nitrogen 25 (H) 6 - 23 mg/dL    Creatinine 1.52 (H) 0.50 - 1.30 mg/dL    eGFR 42 (L) >60 mL/min/1.73m*2    Calcium 9.0 8.6 - 10.3 mg/dL    Albumin 4.0 3.4 - 5.0  g/dL    Alkaline Phosphatase 23 (L) 33 - 136 U/L    Total Protein 7.6 6.4 - 8.2 g/dL    AST 12 9 - 39 U/L    Bilirubin, Total 0.7 0.0 - 1.2 mg/dL    ALT 6 (L) 10 - 52 U/L   B-Type Natriuretic Peptide   Result Value Ref Range    BNP 3,620 (H) 0 - 99 pg/mL   Protime-INR   Result Value Ref Range    Protime 10.8 9.3 - 12.7 seconds    INR 1.0 0.9 - 1.2   APTT   Result Value Ref Range    aPTT 24.9 22.0 - 32.5 seconds   Sars-CoV-2 and Influenza A/B PCR   Result Value Ref Range    Flu A Result Not Detected Not Detected    Flu B Result Not Detected Not Detected    Coronavirus 2019, PCR Not Detected Not Detected   Troponin I, High Sensitivity, Initial   Result Value Ref Range    Troponin I, High Sensitivity 170 (HH) 0 - 20 ng/L   BLOOD GAS ARTERIAL FULL PANEL   Result Value Ref Range    POCT pH, Arterial 7.39 7.38 - 7.42 pH    POCT pCO2, Arterial 30 (L) 38 - 42 mm Hg    POCT pO2, Arterial 185 (H) 85 - 95 mm Hg    POCT SO2, Arterial 100 94 - 100 %    POCT Oxy Hemoglobin, Arterial 97.8 94.0 - 98.0 %    POCT Hematocrit Calculated, Arterial 37.0 (L) 41.0 - 52.0 %    POCT Sodium, Arterial 132 (L) 136 - 145 mmol/L    POCT Potassium, Arterial 4.3 3.5 - 5.3 mmol/L    POCT Chloride, Arterial 103 98 - 107 mmol/L    POCT Ionized Calcium, Arterial 1.19 1.10 - 1.33 mmol/L    POCT Glucose, Arterial 172 (H) 74 - 99 mg/dL    POCT Lactate, Arterial 2.5 (H) 0.4 - 2.0 mmol/L    POCT Base Excess, Arterial -5.7 (L) -2.0 - 3.0 mmol/L    POCT HCO3 Calculated, Arterial 18.2 (L) 22.0 - 26.0 mmol/L    POCT Hemoglobin, Arterial 12.3 (L) 13.5 - 17.5 g/dL    POCT Anion Gap, Arterial 15 10 - 25 mmo/L    Patient Temperature 37.0 degrees Celsius    FiO2 100 %    Apparatus      Ventilator Mode BiPAP     Ipap CMH2O 15.0 cm H2O    Epap CMH2O 5.0 cm H2O    Site of Arterial Puncture Radial Right     Michael's Test Positive    Lactate   Result Value Ref Range    Lactate 1.7 0.4 - 2.0 mmol/L   Troponin, High Sensitivity, 1 Hour   Result Value Ref Range    Troponin  I, High Sensitivity 195 (HH) 0 - 20 ng/L   Troponin I, High Sensitivity   Result Value Ref Range    Troponin I, High Sensitivity 255 (HH) 0 - 20 ng/L     XR chest 1 view    Result Date: 1/10/2025  STUDY: Chest Radiograph;  Shortness of breath INDICATION: Shortness of breath. COMPARISON: 11/21/2024 XR Chest ACCESSION NUMBER(S): [id number] ORDERING CLINICIAN: LISSET CEBALLOS TECHNIQUE:  Frontal chest was obtained at 19:49 hours. FINDINGS: CARDIOMEDIASTINAL SILHOUETTE: The heart remains mildly enlarged but no definite or obvious vascular congestion is appreciated..  LUNGS: There is a moderately large left and a small right pleural effusion with a possible small infrahilar infiltrate on the right.  There is no pneumothorax..  ABDOMEN: No remarkable upper abdominal findings.  BONES: No acute osseous changes.    There is cardiomegaly with a moderately large left and a small right pleural effusion as well as a possible small infrahilar alveolar infiltrate on the right.. Signed by Reid Valiente MD        Assessment/Plan   Assessment & Plan  Congestive heart failure, unspecified HF chronicity, unspecified heart failure type  Chest x-ray cardiomegaly, moderately large left and small right pleural effusion as well as possible small infrahilar alveolar infiltrate on the right.  BNP 3620  Received 40 IV Lasix in the ED.  Lasix 40 mg IV x 2 tomorrow  Echocardiogram pending  CT chest pending for pleural effusion  Cardiology consult    Pneumonia  CT imaging as above, received Rocephin and azithromycin in the ED.  Continue empiric IV antibiotics.  Pneumonia labs pending, procalcitonin pending, sputum culture pending  Follow blood culture    Acute hypoxic respiratory failure (Multi)  Secondary to acute CHF and pneumonia.  Continue supplemental oxygen, wean as tolerable.  Respiratory therapy  Bronchodilators.    Troponin I above reference range  troponin 170->195->255.  Continue to trend.    Likely type II MI demand ischemia secondary  to above  Initial EKG NSR, LBBB  Repeat EKG NSR, nonspecific ST and T wave abnormality,  Telemetry monitoring  Cardiology consult    Acute kidney injury superimposed on CKD (CMS-HCC)  Avoid nephrotoxic agents  Holding lisinopril 20  Nephrology consult for diuretic management for CHF    Kaposi sarcoma (Multi)  Chronic, present on lower extremities    Essential hypertension, benign  Intake blood pressure 148/73, acceptable  Lisinopril 20 on hold for JAYDEN  Continue to monitor.      DVT and GI prophylaxis: Enoxaparin and PPI.    CODE STATUS: Full code    I spent 75 minutes in the professional and overall care of this patient.      Ruperto Theodore MD

## 2025-01-11 NOTE — ASSESSMENT & PLAN NOTE
troponin 170->195->255.  Continue to trend.    Likely type II MI demand ischemia secondary to above  Initial EKG NSR, LBBB  Repeat EKG NSR, nonspecific ST and T wave abnormality,  Telemetry monitoring  Cardiology consult

## 2025-01-11 NOTE — ED PROVIDER NOTES
"HPI   Chief Complaint   Patient presents with    Shortness of Breath     Pt comes in short of breath and is anxious, keeps sitting forward, very restless. Pt was found to be 82% on 4L nc.       HPI  Patient is a 89-year-old male with history of hypertension, hyperlipidemia, CKD 3, aortic stenosis presenting brought in by EMS for evaluation of shortness of breath.  Patient was found to be hypoxic by EMS and put on 4 L and was 82% on 4 L upon arrival.  The patient denies chest pain.  Per EMS he was stating \"I feel like I am going to die\" several times today.      Patient History   Past Medical History:   Diagnosis Date    JAYDEN (acute kidney injury) (CMS-[facility]) 01/02/2024    Closed nondisplaced fracture of first cervical vertebra 01/26/2024    Dens fracture, closed, initial encounter (Multi) 01/01/2024    Dysphagia 01/26/2024    Edema of both lower legs 08/21/2023    Fall (on) (from) other stairs and steps, initial encounter 01/26/2024    Hip pain, right 12/19/2011    Multiple skin tears 01/02/2024    Onychomycosis 08/21/2023    Personal history of malignant neoplasm, unspecified 02/06/2017    History of Kaposi's sarcoma    Personal history of other diseases of the circulatory system     History of cardiac murmur    Pre-ulcerative calluses 08/21/2023     No past surgical history on file.  Family History   Problem Relation Name Age of Onset    Colon cancer Mother      Colon cancer Father      Colon cancer Brother       Social History     Tobacco Use    Smoking status: Former     Types: Cigarettes    Smokeless tobacco: Never   Substance Use Topics    Alcohol use: Yes    Drug use: Never       Physical Exam   ED Triage Vitals   Temperature Heart Rate Respirations BP   01/10/25 1943 01/10/25 1937 01/1935 01/10/25 1937   36.7 °C (98.1 °F) 78 (!) 31 148/73      Pulse Ox Temp Source Heart Rate Source Patient Position   01/1935 01/10/25 1943 -- --   100 % Axillary        BP Location FiO2 (%)     -- 01/1935      " 100 %       Physical Exam  Vitals and nursing note reviewed.   Constitutional:       General: He is not in acute distress.     Appearance: He is well-developed.   HENT:      Head: Normocephalic and atraumatic.   Eyes:      Conjunctiva/sclera: Conjunctivae normal.   Cardiovascular:      Rate and Rhythm: Normal rate and regular rhythm.      Heart sounds: No murmur heard.  Pulmonary:      Effort: Tachypnea and accessory muscle usage present.      Comments: Breath sounds with rhonchi and crackles more prominent at the bases bilaterally  Abdominal:      Palpations: Abdomen is soft.      Tenderness: There is no abdominal tenderness.   Musculoskeletal:         General: No swelling.      Cervical back: Neck supple.      Comments: +1 pretibial edema bilaterally   Skin:     General: Skin is warm and dry.      Capillary Refill: Capillary refill takes less than 2 seconds.   Neurological:      Mental Status: He is alert.   Psychiatric:         Mood and Affect: Mood normal.           ED Course & [facility]   ED Course as of 01/11/25 0158   Fri Maicol 10, 2025   1923 Patient presents 82% on 4 L nasal cannula brought in by home by EMS, respiratory called by nursing staff, patient placed on BiPAP with improvement currently 95% though with some work of breathing still present. [JJ]   2016 Patient appears much better after being on the BiPAP and is sitting back in the hospital bed in no apparent distress satting 100%.  Daughter at bedside. [JJ]   2017 30 cc/kg fluid bolus not given for concerns of fluid overload [JJ]   2026 ECG 12 lead  ECG performed on January 10, 2025 at 1924 and interpreted by me at 1925 showing a normal sinus rhythm, no axis deviation, prolonged QRS consistent with LBBB not meeting Sgarbossa Criteria, otherwise intervals within normal limits, no STEMI.  Nonspecific ST-T wave abnormality.  No previous for comparison. [EG]      ED Course User Index  [EG] Dorys Lima MD  [JJ] Tanesha Greco PA-C         Diagnoses  as of 01/11/25 0158   Congestive heart failure, unspecified HF chronicity, unspecified heart failure type   Acute hypoxic respiratory failure (Multi)                 No data recorded     [place] Coma Scale Score: 15 (01/11/25 0133 : Katie Jones RN)                           Medical Decision Making  Parts of this chart have been completed using voice recognition software. Please excuse any errors of transcription.  My thought process and reason for plan has been formulated from the time that I saw the patient until the time of disposition and is not specific to one specific moment during their visit and furthermore my MDM encompasses this entire chart and not only this text box.      HPI: Detailed above.    Exam: A medically appropriate exam performed, outlined above, given the known history and presentation.    History obtained from: Patient, daughter    EKG: Interpreted by attending physician    Social Determinants of Health considered during this visit: Lives independently    Medications given during visit:  Medications   oxygen (O2) therapy (100 percent inhalation Start 1/10/25 1939)   cefTRIAXone (Rocephin) 2 g in dextrose (iso) IV 50 mL (0 g intravenous Stopped 1/10/25 2039)   azithromycin (Zithromax) 500 mg in dextrose 5% 250 mL IV (0 mg intravenous Stopped 1/10/25 2205)   aspirin chewable tablet 324 mg (324 mg oral Given 1/10/25 2203)   furosemide (Lasix) injection 40 mg (40 mg intravenous Given 1/10/25 2203)        Diagnostic/tests  Labs Reviewed   CBC WITH AUTO DIFFERENTIAL - Abnormal       Result Value    WBC 12.1 (*)     nRBC 0.0      RBC 3.78 (*)     Hemoglobin 11.8 (*)     Hematocrit 35.8 (*)     MCV 95      MCH 31.2      MCHC 33.0      RDW 13.2      Platelets 311      Neutrophils % 63.5      Immature Granulocytes %, Automated 0.4      Lymphocytes % 28.2      Monocytes % 6.7      Eosinophils % 0.7      Basophils % 0.5      Neutrophils Absolute 7.68 (*)     Immature Granulocytes Absolute, Automated  0.05      Lymphocytes Absolute 3.41 (*)     Monocytes Absolute 0.81 (*)     Eosinophils Absolute 0.09      Basophils Absolute 0.06     COMPREHENSIVE METABOLIC PANEL - Abnormal    Glucose 150 (*)     Sodium 134 (*)     Potassium 3.9      Chloride 102      Bicarbonate 20 (*)     Anion Gap 16      Urea Nitrogen 25 (*)     Creatinine 1.52 (*)     eGFR 42 (*)     Calcium 9.0      Albumin 4.0      Alkaline Phosphatase 23 (*)     Total Protein 7.6      AST 12      Bilirubin, Total 0.7      ALT 6 (*)    B-TYPE NATRIURETIC PEPTIDE - Abnormal    BNP 3,620 (*)     Narrative:        <100 pg/mL - Heart failure unlikely  100-299 pg/mL - Intermediate probability of acute heart                  failure exacerbation. Correlate with clinical                  context and patient history.    >=300 pg/mL - Heart Failure likely. Correlate with clinical                  context and patient history.    BNP testing is performed using different testing methodology at [facility] than at other [facility]. Direct result comparisons should only be made within the same method.      SERIAL TROPONIN-INITIAL - Abnormal    Troponin I, High Sensitivity 170 (*)     Narrative:     Less than 99th percentile of normal range cutoff-  Female and children under 18 years old <14 ng/L; Male <21 ng/L: Negative  Repeat testing should be performed if clinically indicated.     Female and children under 18 years old 14-50 ng/L; Male 21-50 ng/L:  Consistent with possible cardiac damage and possible increased clinical   risk. Serial measurements may help to assess extent of myocardial damage.     >50 ng/L: Consistent with cardiac damage, increased clinical risk and  myocardial infarction. Serial measurements may help assess extent of   myocardial damage.      NOTE: Children less than 1 year old may have higher baseline troponin   levels and results should be interpreted in conjunction with the overall   clinical context.     NOTE: Troponin I  testing is performed using a different   testing methodology at [facility] than at other   [facility]. Direct result comparisons should only   be made within the same method.   BLOOD GAS ARTERIAL FULL PANEL - Abnormal    POCT pH, Arterial 7.39      POCT pCO2, Arterial 30 (*)     POCT pO2, Arterial 185 (*)     POCT SO2, Arterial 100      POCT Oxy Hemoglobin, Arterial 97.8      POCT Hematocrit Calculated, Arterial 37.0 (*)     POCT Sodium, Arterial 132 (*)     POCT Potassium, Arterial 4.3      POCT Chloride, Arterial 103      POCT Ionized Calcium, Arterial 1.19      POCT Glucose, Arterial 172 (*)     POCT Lactate, Arterial 2.5 (*)     POCT Base Excess, Arterial -5.7 (*)     POCT HCO3 Calculated, Arterial 18.2 (*)     POCT Hemoglobin, Arterial 12.3 (*)     POCT Anion Gap, Arterial 15      Patient Temperature 37.0      FiO2 100      Apparatus        Ventilator Mode BiPAP      Ipap CMH2O 15.0      Epap CMH2O 5.0      Site of Arterial Puncture Radial Right      Michael's Test Positive     SERIAL TROPONIN, 1 HOUR - Abnormal    Troponin I, High Sensitivity 195 (*)     Narrative:     Less than 99th percentile of normal range cutoff-  Female and children under 18 years old <14 ng/L; Male <21 ng/L: Negative  Repeat testing should be performed if clinically indicated.     Female and children under 18 years old 14-50 ng/L; Male 21-50 ng/L:  Consistent with possible cardiac damage and possible increased clinical   risk. Serial measurements may help to assess extent of myocardial damage.     >50 ng/L: Consistent with cardiac damage, increased clinical risk and  myocardial infarction. Serial measurements may help assess extent of   myocardial damage.      NOTE: Children less than 1 year old may have higher baseline troponin   levels and results should be interpreted in conjunction with the overall   clinical context.     NOTE: Troponin I testing is performed using a different   testing methodology at [facility] than at other   [facility]. Direct result comparisons should only   be made within the same method.   TROPONIN I, HIGH SENSITIVITY - Abnormal    Troponin I, High Sensitivity 255 (*)     Narrative:     Less than 99th percentile of normal range cutoff-  Female and children under 18 years old <14 ng/L; Male <21 ng/L: Negative  Repeat testing should be performed if clinically indicated.     Female and children under 18 years old 14-50 ng/L; Male 21-50 ng/L:  Consistent with possible cardiac damage and possible increased clinical   risk. Serial measurements may help to assess extent of myocardial damage.     >50 ng/L: Consistent with cardiac damage, increased clinical risk and  myocardial infarction. Serial measurements may help assess extent of   myocardial damage.      NOTE: Children less than 1 year old may have higher baseline troponin   levels and results should be interpreted in conjunction with the overall   clinical context.     NOTE: Troponin I testing is performed using a different   testing methodology at [facility] than at [facility]. Direct result comparisons should only   be made within the same method.   PROTIME-INR - Normal    Protime 10.8      INR 1.0      Narrative:     INR Therapeutic Range: 2.0-3.5   APTT - Normal    aPTT 24.9     SARS-COV-2 AND INFLUENZA A/B PCR - Normal    Flu A Result Not Detected      Flu B Result Not Detected      Coronavirus 2019, PCR Not Detected      Narrative:     This assay has received FDA Emergency Use Authorization (EUA) and  is only authorized for the duration of time that circumstances exist to justify the authorization of the emergency use of in vitro diagnostic tests for the detection of SARS-CoV-2 virus and/or diagnosis of COVID-19 infection under section 564(b)(1) of the Act, 21 U.S.C. 360bbb-3(b)(1). Testing for SARS-CoV-2 is only recommended for patients who meet current clinical and/or epidemiological criteria as  defined by federal, state, or local public health directives. This assay is an in vitro diagnostic nucleic acid amplification test for the qualitative detection of SARS-CoV-2, Influenza A, and Influenza B from nasopharyngeal specimens and has been validated for use at [facility]. Negative results do not preclude COVID-19 infections or Influenza A/B infections, and should not be used as the sole basis for diagnosis, treatment, or other management decisions. If Influenza A/B and RSV PCR results are negative, testing for Parainfluenza virus, Adenovirus and Metapneumovirus is routinely performed for [facility] pediatric oncology and intensive care inpatients, and is available on other patients by placing an add-on request.    LACTATE - Normal    Lactate 1.7      Narrative:     Venipuncture immediately after or during the administration of Metamizole may lead to falsely low results. Testing should be performed immediately prior to Metamizole dosing.   BLOOD CULTURE   BLOOD CULTURE   TROPONIN SERIES- (INITIAL, 1 HR)    Narrative:     The following orders were created for panel order Troponin I Series, High Sensitivity (0, 1 HR).  Procedure                               Abnormality         Status                     ---------                               -----------         ------                     Troponin I, High Sensiti...[[id number]]  Abnormal            Final result               Troponin, High Sensitivi...[[id number]]  Abnormal            Final result                 Please view results for these tests on the individual orders.   BLOOD GAS LACTIC ACID, VENOUS      XR chest 1 view   Final Result   There is cardiomegaly with a moderately large left and a small right   pleural effusion as well as a possible small infrahilar alveolar   infiltrate on the right..   Signed by Reid Valiente MD           Considerations/further MDM:  Patient is a 89-year-old male brought in by EMS for shortness of breath,  hypoxia    I saw this patient in conjunction with Dr. Nathan.    Patient hypoxic 82% on 4 L placed on BiPAP with improvement and was weaned to 4 L nasal cannula.  EKG interpreted by attending physician with evidence of a left bundle branch block but without meeting Sgarbossa criteria.  Chest x-ray does show a lung infiltrate on the right, blood cultures were obtained and patient is placed on ceftriaxone and azithromycin for coverage of pneumonia.  Patient clinically appears fluid overloaded and BNP is elevated concerning for acute congestive heart failure for which the patient was provided 40 mg IV of Lasix.  Cardiac enzymes are elevated with initial 170, repeat 195 though this is felt likely to be related to the congestive heart failure.  Patient remains chest pain-free during the visit.  He is admitted to the stepdown unit under the hospitalist service for further management of his acute hypoxic respiratory failure.      Procedure  Critical Care    Performed by: Tanesha Greco PA-C  Authorized by: Dorys Lima MD    Critical care provider statement:     Critical care time (minutes):  35    Critical care time was exclusive of:  Separately billable procedures and treating other patients    Critical care was necessary to treat or prevent imminent or life-threatening deterioration of the following conditions:  Respiratory failure    Critical care was time spent personally by me on the following activities:  Development of treatment plan with patient or surrogate, evaluation of patient's response to treatment, examination of patient, obtaining history from patient or surrogate, ordering and performing treatments and interventions, ordering and review of laboratory studies, pulse oximetry, ordering and review of radiographic studies, re-evaluation of patient's condition and review of old charts    Care discussed with: admitting provider         Tanesha Greco PA-C  01/11/25 0158

## 2025-01-11 NOTE — ASSESSMENT & PLAN NOTE
Hold lisinopril  Continue home amlodipine  DVT prophylaxis  Lovenox  Disposition  SNF for rehab versus home with home health care once cleared  Care coordination has been consulted

## 2025-01-11 NOTE — PROGRESS NOTES
Reid Cano is a 89 y.o. male on day 1 of admission presenting with Congestive heart failure, unspecified HF chronicity, unspecified heart failure type.    Patient agitated, confused, RNCC called daughter Coty to conduct assessment, she put her spouse Kishan, [phone], on the phone.   Patient lives alone in a ranch style house with a basement, he does not use the basement. No use of assistive devices. He has a caretaker for 4 hrs/day who does housework and spends time with him. Kishan states they are looking at the possibility of assisted living. Prateek and Coty provide transportation and do patient's iADLs, organize his medications. PCP is Yessica Alvarez MD.  ADOD 01/14/2025  PT/OT ordered, not yet seen.  RNCC explained discharge planning, Kishan states he wants to better understand patient's current diagnosis before deciding on discharge needs.   RNCC will follow for updates and planning.       Rossy Mcghee RN

## 2025-01-11 NOTE — ASSESSMENT & PLAN NOTE
Avoid nephrotoxic agents  Holding lisinopril 20  Nephrology consult for diuretic management for CHF

## 2025-01-11 NOTE — CONSULTS
"Reason For Consult  Acute CHF requiring diuretic, JAYDEN on CKD.  Diuretic management.     History Of Present Illness  Reid Cano is a 89 y.o. male   PMH; HTN, HLD, CKD 3, AS, Kaposi's sarcoma.   Patient presented to the ED was found to saturating 82% on 4 L oxygen upon arrival.  Patient does not speak English well, he speaks [place]ian and Nepalese.  Per EMS his shortness of breath started around 6 PM he had mentioned \" he is going to die\" a couple times today. Patient denies chest pain, abdominal pain, fever or chills.    In the ED on admission vitals notable for respiratory rate 31, /73, saturating 100% on BiPAP.  After 2 hours of BiPAP patient was taken down to 4 L and stable at 97%.  He was also given Rocephin and azithromycin in the ED, aspirin 324, Lasix 40 IV.  Labs notable for glucose 150, sodium 134, bicarb 20, BUN 25, CR 1.52, EGFR 42, BNP 3620, troponin 170->195->255.  CBC notable for WBC 12.1, hemoglobin 11.8, HCT 35.8,  Patient is admitted for acute CHF and JAYDEN.  Nephrology is consulted for JAYDEN, volume management.        Past Medical History  He has a past medical history of JAYDEN (acute kidney injury) (CMS-HCC) (01/02/2024), Closed nondisplaced fracture of first cervical vertebra (01/26/2024), Dens fracture, closed, initial encounter (Multi) (01/01/2024), Dysphagia (01/26/2024), Edema of both lower legs (08/21/2023), Fall (on) (from) other stairs and steps, initial encounter (01/26/2024), Hip pain, right (12/19/2011), Multiple skin tears (01/02/2024), Onychomycosis (08/21/2023), Personal history of malignant neoplasm, unspecified (02/06/2017), Personal history of other diseases of the circulatory system, and Pre-ulcerative calluses (08/21/2023).    Surgical History  He has no past surgical history on file.     Social History  He reports that he has quit smoking. His smoking use included cigarettes. He has never used smokeless tobacco. He reports current alcohol use. He reports that he does not use " "drugs.    Family History  Family History   Problem Relation Name Age of Onset    Colon cancer Mother      Colon cancer Father      Colon cancer Brother          Allergies  Patient has no known allergies.  Scheduled medications  amLODIPine, 5 mg, oral, Daily  aspirin, 81 mg, oral, Daily  azithromycin, 500 mg, intravenous, Nightly  cefTRIAXone, 1 g, intravenous, Nightly  enoxaparin, 30 mg, subcutaneous, Daily  furosemide, 40 mg, intravenous, q12h  [Held by provider] lisinopril, 20 mg, oral, Daily  metoprolol succinate XL, 50 mg, oral, Daily  nortriptyline, 10 mg, oral, Nightly  oxygen, , inhalation, Continuous - Inhalation  pantoprazole, 40 mg, oral, Daily before breakfast   Or  pantoprazole, 40 mg, intravenous, Daily before breakfast  polyethylene glycol, 17 g, oral, Daily  sennosides, 8.6 mg, oral, Nightly  sertraline, 50 mg, oral, Nightly      Continuous medications     PRN medications  PRN medications: acetaminophen **OR** acetaminophen **OR** acetaminophen, ipratropium-albuteroL, melatonin, ondansetron **OR** ondansetron    Review of Systems  12 systems were reviewed and pertinent findings were addressed in HPI       Physical Exam   General: alert, no diaphoresis   HENT: mucous membranes moist, external ears normal, no rhinorrhea   Eyes: no icterus or injection, no discharge   Lungs: Diffuse decreased bilateral breath sounds   Heart: RRR, systolic murmur, pitting LE edema BL   GI: abdomen soft, nontender, nondistended, BS present   MSK: no joint effusion or deformity   Skin: Kaposi's sarcoma lesions on bilateral lower extremity   Neuro: grossly normal cognition, motor strength, sensation         I&O 24HR    Intake/Output Summary (Last 24 hours) at 1/11/2025 0515  Last data filed at 1/11/2025 0017  Gross per 24 hour   Intake 300 ml   Output 300 ml   Net 0 ml       Vitals 24HR  Heart Rate:  [58-78]   Temperature:  [36.7 °C (98.1 °F)]   Respirations:  [17-31]   BP: (131-148)/(54-73)   Height:  [167.6 cm (5' 6\")] "   Weight:  [67.1 kg (147 lb 14.9 oz)]   Pulse Ox:  [92 %-100 %]     Relevant Results  Results for orders placed or performed during the hospital encounter of 01/10/25 (from the past 24 hours)   CBC and Auto Differential   Result Value Ref Range    WBC 12.1 (H) 4.4 - 11.3 x10*3/uL    nRBC 0.0 0.0 - 0.0 /100 WBCs    RBC 3.78 (L) 4.50 - 5.90 x10*6/uL    Hemoglobin 11.8 (L) 13.5 - 17.5 g/dL    Hematocrit 35.8 (L) 41.0 - 52.0 %    MCV 95 80 - 100 fL    MCH 31.2 26.0 - 34.0 pg    MCHC 33.0 32.0 - 36.0 g/dL    RDW 13.2 11.5 - 14.5 %    Platelets 311 150 - 450 x10*3/uL    Neutrophils % 63.5 40.0 - 80.0 %    Immature Granulocytes %, Automated 0.4 0.0 - 0.9 %    Lymphocytes % 28.2 13.0 - 44.0 %    Monocytes % 6.7 2.0 - 10.0 %    Eosinophils % 0.7 0.0 - 6.0 %    Basophils % 0.5 0.0 - 2.0 %    Neutrophils Absolute 7.68 (H) 1.60 - 5.50 x10*3/uL    Immature Granulocytes Absolute, Automated 0.05 0.00 - 0.50 x10*3/uL    Lymphocytes Absolute 3.41 (H) 0.80 - 3.00 x10*3/uL    Monocytes Absolute 0.81 (H) 0.05 - 0.80 x10*3/uL    Eosinophils Absolute 0.09 0.00 - 0.40 x10*3/uL    Basophils Absolute 0.06 0.00 - 0.10 x10*3/uL   Comprehensive metabolic panel   Result Value Ref Range    Glucose 150 (H) 74 - 99 mg/dL    Sodium 134 (L) 136 - 145 mmol/L    Potassium 3.9 3.5 - 5.3 mmol/L    Chloride 102 98 - 107 mmol/L    Bicarbonate 20 (L) 21 - 32 mmol/L    Anion Gap 16 10 - 20 mmol/L    Urea Nitrogen 25 (H) 6 - 23 mg/dL    Creatinine 1.52 (H) 0.50 - 1.30 mg/dL    eGFR 42 (L) >60 mL/min/1.73m*2    Calcium 9.0 8.6 - 10.3 mg/dL    Albumin 4.0 3.4 - 5.0 g/dL    Alkaline Phosphatase 23 (L) 33 - 136 U/L    Total Protein 7.6 6.4 - 8.2 g/dL    AST 12 9 - 39 U/L    Bilirubin, Total 0.7 0.0 - 1.2 mg/dL    ALT 6 (L) 10 - 52 U/L   B-Type Natriuretic Peptide   Result Value Ref Range    BNP 3,620 (H) 0 - 99 pg/mL   Protime-INR   Result Value Ref Range    Protime 10.8 9.3 - 12.7 seconds    INR 1.0 0.9 - 1.2   APTT   Result Value Ref Range    aPTT 24.9 22.0 -  32.5 seconds   Sars-CoV-2 and Influenza A/B PCR   Result Value Ref Range    Flu A Result Not Detected Not Detected    Flu B Result Not Detected Not Detected    Coronavirus 2019, PCR Not Detected Not Detected   Troponin I, High Sensitivity, Initial   Result Value Ref Range    Troponin I, High Sensitivity 170 (HH) 0 - 20 ng/L   BLOOD GAS ARTERIAL FULL PANEL   Result Value Ref Range    POCT pH, Arterial 7.39 7.38 - 7.42 pH    POCT pCO2, Arterial 30 (L) 38 - 42 mm Hg    POCT pO2, Arterial 185 (H) 85 - 95 mm Hg    POCT SO2, Arterial 100 94 - 100 %    POCT Oxy Hemoglobin, Arterial 97.8 94.0 - 98.0 %    POCT Hematocrit Calculated, Arterial 37.0 (L) 41.0 - 52.0 %    POCT Sodium, Arterial 132 (L) 136 - 145 mmol/L    POCT Potassium, Arterial 4.3 3.5 - 5.3 mmol/L    POCT Chloride, Arterial 103 98 - 107 mmol/L    POCT Ionized Calcium, Arterial 1.19 1.10 - 1.33 mmol/L    POCT Glucose, Arterial 172 (H) 74 - 99 mg/dL    POCT Lactate, Arterial 2.5 (H) 0.4 - 2.0 mmol/L    POCT Base Excess, Arterial -5.7 (L) -2.0 - 3.0 mmol/L    POCT HCO3 Calculated, Arterial 18.2 (L) 22.0 - 26.0 mmol/L    POCT Hemoglobin, Arterial 12.3 (L) 13.5 - 17.5 g/dL    POCT Anion Gap, Arterial 15 10 - 25 mmo/L    Patient Temperature 37.0 degrees Celsius    FiO2 100 %    Apparatus      Ventilator Mode BiPAP     Ipap CMH2O 15.0 cm H2O    Epap CMH2O 5.0 cm H2O    Site of Arterial Puncture Radial Right     Michael's Test Positive    Lactate   Result Value Ref Range    Lactate 1.7 0.4 - 2.0 mmol/L   Troponin, High Sensitivity, 1 Hour   Result Value Ref Range    Troponin I, High Sensitivity 195 (HH) 0 - 20 ng/L   Blood Gas Lactic Acid, Venous   Result Value Ref Range    POCT Lactate, Venous 1.1 0.4 - 2.0 mmol/L   Troponin I, High Sensitivity   Result Value Ref Range    Troponin I, High Sensitivity 255 (HH) 0 - 20 ng/L   Troponin I, High Sensitivity   Result Value Ref Range    Troponin I, High Sensitivity 263 (HH) 0 - 20 ng/L          Assessment/Plan     Assessment &  Plan  Congestive heart failure, unspecified HF chronicity, unspecified heart failure type    Chronic kidney disease (CKD), stage III (moderate) (Multi)    Kaposi sarcoma (Multi)    Troponin I above reference range    Essential hypertension, benign    Acute kidney injury superimposed on CKD (CMS-HCC)    Pneumonia    Acute hypoxic respiratory failure (Multi)      // CHF with volume overload.   Chest x-ray cardiomegaly, moderately large left and small right pleural effusion as well as possible small infrahilar alveolar infiltrate on the right.  Received 40 IV Lasix in the ED.  Echo 2020; 55-60%, Severe aortic valve stenosis, moderate to severe aortic valve cusp calcification. Mild to moderate aortic valve regurgitation.  Echocardiogram pending  CT chest pending for pleural effusion  Lasix 40 mg IV BID.   I+O  Daily weight.       // CKD 3B  - Baseline Cr; 1.4-1.7 going back to 2017   - Baseline GFR; 40-45  - Avoid nephrotoxic medications including IV contrast if possible.   - Dose medications to eGFR.   - Check urine electrolytes;   - Renal US;   - UPCR;   - Etiology is likely;     // Anemia of CKD   - Hgb at goal.   - Check TSAT  - Check ferritin level.   - Check B12   - Check folic acid.   - Medications;     // CKD MBD.   - Check PTH.   - Check 25D   - Ca and phos at goal.   - Medications; cholecal 5000 units every day     // Metabolic acidosis     // HTN   Intake blood pressure 148/73, acceptable  Home meds; norvasc 5, lisinopril 20, toprol 50.   Continue to monitor.       Pneumonia  Acute hypoxic respiratory failure (Multi)  Kaposi sarcoma (Multi)            Rajesh Klein MD

## 2025-01-11 NOTE — ASSESSMENT & PLAN NOTE
Chest x-ray cardiomegaly, moderately large left and small right pleural effusion as well as possible small infrahilar alveolar infiltrate on the right.  BNP 3620  Received 40 IV Lasix in the ED.  Lasix 40 mg IV x 2 tomorrow  Echocardiogram pending  CT chest pending for pleural effusion  Cardiology consult

## 2025-01-12 VITALS
WEIGHT: 147.93 LBS | TEMPERATURE: 97.7 F | RESPIRATION RATE: 17 BRPM | DIASTOLIC BLOOD PRESSURE: 53 MMHG | OXYGEN SATURATION: 100 % | BODY MASS INDEX: 23.77 KG/M2 | HEIGHT: 66 IN | HEART RATE: 72 BPM | SYSTOLIC BLOOD PRESSURE: 99 MMHG

## 2025-01-12 PROBLEM — I50.33 ACUTE ON CHRONIC DIASTOLIC HEART FAILURE: Status: ACTIVE | Noted: 2025-01-10

## 2025-01-12 LAB
ALBUMIN SERPL BCP-MCNC: 3.5 G/DL (ref 3.4–5)
ANION GAP SERPL CALCULATED.3IONS-SCNC: 13 MMOL/L (ref 10–20)
BACTERIA BLD CULT: NORMAL
BACTERIA BLD CULT: NORMAL
BUN SERPL-MCNC: 36 MG/DL (ref 6–23)
CALCIUM SERPL-MCNC: 8.5 MG/DL (ref 8.6–10.3)
CHLORIDE SERPL-SCNC: 101 MMOL/L (ref 98–107)
CO2 SERPL-SCNC: 25 MMOL/L (ref 21–32)
CREAT SERPL-MCNC: 1.72 MG/DL (ref 0.5–1.3)
EGFRCR SERPLBLD CKD-EPI 2021: 36 ML/MIN/1.73M*2
ERYTHROCYTE [DISTWIDTH] IN BLOOD BY AUTOMATED COUNT: 13.2 % (ref 11.5–14.5)
GLUCOSE SERPL-MCNC: 98 MG/DL (ref 74–99)
HCT VFR BLD AUTO: 32.7 % (ref 41–52)
HGB BLD-MCNC: 11 G/DL (ref 13.5–17.5)
LEGIONELLA AG UR QL: NEGATIVE
MCH RBC QN AUTO: 31.3 PG (ref 26–34)
MCHC RBC AUTO-ENTMCNC: 33.6 G/DL (ref 32–36)
MCV RBC AUTO: 93 FL (ref 80–100)
NRBC BLD-RTO: 0 /100 WBCS (ref 0–0)
PHOSPHATE SERPL-MCNC: 4.5 MG/DL (ref 2.5–4.9)
PLATELET # BLD AUTO: 247 X10*3/UL (ref 150–450)
POTASSIUM SERPL-SCNC: 3.4 MMOL/L (ref 3.5–5.3)
PROCALCITONIN SERPL-MCNC: 0.14 NG/ML
RBC # BLD AUTO: 3.52 X10*6/UL (ref 4.5–5.9)
S PNEUM AG UR QL: NEGATIVE
SODIUM SERPL-SCNC: 136 MMOL/L (ref 136–145)
WBC # BLD AUTO: 6.7 X10*3/UL (ref 4.4–11.3)

## 2025-01-12 PROCEDURE — 1210000001 HC SEMI-PRIVATE ROOM DAILY

## 2025-01-12 PROCEDURE — 84100 ASSAY OF PHOSPHORUS: CPT | Performed by: NURSE PRACTITIONER

## 2025-01-12 PROCEDURE — 2500000002 HC RX 250 W HCPCS SELF ADMINISTERED DRUGS (ALT 637 FOR MEDICARE OP, ALT 636 FOR OP/ED): Performed by: NURSE PRACTITIONER

## 2025-01-12 PROCEDURE — 2500000002 HC RX 250 W HCPCS SELF ADMINISTERED DRUGS (ALT 637 FOR MEDICARE OP, ALT 636 FOR OP/ED): Performed by: STUDENT IN AN ORGANIZED HEALTH CARE EDUCATION/TRAINING PROGRAM

## 2025-01-12 PROCEDURE — 99233 SBSQ HOSP IP/OBS HIGH 50: CPT | Performed by: PHYSICIAN ASSISTANT

## 2025-01-12 PROCEDURE — 2500000004 HC RX 250 GENERAL PHARMACY W/ HCPCS (ALT 636 FOR OP/ED): Performed by: STUDENT IN AN ORGANIZED HEALTH CARE EDUCATION/TRAINING PROGRAM

## 2025-01-12 PROCEDURE — 99233 SBSQ HOSP IP/OBS HIGH 50: CPT | Performed by: NURSE PRACTITIONER

## 2025-01-12 PROCEDURE — 80069 RENAL FUNCTION PANEL: CPT | Performed by: NURSE PRACTITIONER

## 2025-01-12 PROCEDURE — 36415 COLL VENOUS BLD VENIPUNCTURE: CPT | Performed by: NURSE PRACTITIONER

## 2025-01-12 PROCEDURE — 2500000005 HC RX 250 GENERAL PHARMACY W/O HCPCS

## 2025-01-12 PROCEDURE — 2500000005 HC RX 250 GENERAL PHARMACY W/O HCPCS: Performed by: STUDENT IN AN ORGANIZED HEALTH CARE EDUCATION/TRAINING PROGRAM

## 2025-01-12 PROCEDURE — 85027 COMPLETE CBC AUTOMATED: CPT | Performed by: NURSE PRACTITIONER

## 2025-01-12 PROCEDURE — 2500000001 HC RX 250 WO HCPCS SELF ADMINISTERED DRUGS (ALT 637 FOR MEDICARE OP): Performed by: STUDENT IN AN ORGANIZED HEALTH CARE EDUCATION/TRAINING PROGRAM

## 2025-01-12 RX ORDER — POTASSIUM CHLORIDE 20 MEQ/1
40 TABLET, EXTENDED RELEASE ORAL ONCE
Status: COMPLETED | OUTPATIENT
Start: 2025-01-12 | End: 2025-01-12

## 2025-01-12 RX ADMIN — METOPROLOL SUCCINATE 50 MG: 50 TABLET, EXTENDED RELEASE ORAL at 08:41

## 2025-01-12 RX ADMIN — ASPIRIN 81 MG: 81 TABLET, COATED ORAL at 08:41

## 2025-01-12 RX ADMIN — POTASSIUM CHLORIDE 40 MEQ: 1500 TABLET, EXTENDED RELEASE ORAL at 08:41

## 2025-01-12 RX ADMIN — SENNOSIDES 8.6 MG: 8.6 TABLET, FILM COATED ORAL at 20:00

## 2025-01-12 RX ADMIN — Medication 3 MG: at 20:00

## 2025-01-12 RX ADMIN — AMLODIPINE BESYLATE 5 MG: 5 TABLET ORAL at 08:41

## 2025-01-12 RX ADMIN — PANTOPRAZOLE SODIUM 40 MG: 40 TABLET, DELAYED RELEASE ORAL at 06:02

## 2025-01-12 RX ADMIN — Medication 3 L/MIN: at 21:41

## 2025-01-12 RX ADMIN — NORTRIPTYLINE HYDROCHLORIDE 10 MG: 10 CAPSULE ORAL at 20:00

## 2025-01-12 RX ADMIN — ENOXAPARIN SODIUM 30 MG: 30 INJECTION SUBCUTANEOUS at 08:42

## 2025-01-12 RX ADMIN — Medication 5 L/MIN: at 08:41

## 2025-01-12 RX ADMIN — SERTRALINE 50 MG: 50 TABLET, FILM COATED ORAL at 20:00

## 2025-01-12 SDOH — SOCIAL STABILITY: SOCIAL INSECURITY: DOES ANYONE TRY TO KEEP YOU FROM HAVING/CONTACTING OTHER FRIENDS OR DOING THINGS OUTSIDE YOUR HOME?: UNABLE TO ASSESS

## 2025-01-12 SDOH — SOCIAL STABILITY: SOCIAL INSECURITY: HAS ANYONE EVER THREATENED TO HURT YOUR FAMILY OR YOUR PETS?: UNABLE TO ASSESS

## 2025-01-12 SDOH — SOCIAL STABILITY: SOCIAL INSECURITY: ARE YOU OR HAVE YOU BEEN THREATENED OR ABUSED PHYSICALLY, EMOTIONALLY, OR SEXUALLY BY ANYONE?: UNABLE TO ASSESS

## 2025-01-12 SDOH — SOCIAL STABILITY: SOCIAL INSECURITY: ARE THERE ANY APPARENT SIGNS OF INJURIES/BEHAVIORS THAT COULD BE RELATED TO ABUSE/NEGLECT?: UNABLE TO ASSESS

## 2025-01-12 SDOH — SOCIAL STABILITY: SOCIAL INSECURITY: ABUSE: ADULT

## 2025-01-12 SDOH — SOCIAL STABILITY: SOCIAL INSECURITY: DO YOU FEEL ANYONE HAS EXPLOITED OR TAKEN ADVANTAGE OF YOU FINANCIALLY OR OF YOUR PERSONAL PROPERTY?: UNABLE TO ASSESS

## 2025-01-12 SDOH — SOCIAL STABILITY: SOCIAL INSECURITY: WERE YOU ABLE TO COMPLETE ALL THE BEHAVIORAL HEALTH SCREENINGS?: NO

## 2025-01-12 SDOH — SOCIAL STABILITY: SOCIAL INSECURITY: DO YOU FEEL UNSAFE GOING BACK TO THE PLACE WHERE YOU ARE LIVING?: UNABLE TO ASSESS

## 2025-01-12 SDOH — SOCIAL STABILITY: SOCIAL INSECURITY: HAVE YOU HAD THOUGHTS OF HARMING ANYONE ELSE?: UNABLE TO ASSESS

## 2025-01-12 SDOH — SOCIAL STABILITY: SOCIAL INSECURITY: HAVE YOU HAD ANY THOUGHTS OF HARMING ANYONE ELSE?: UNABLE TO ASSESS

## 2025-01-12 ASSESSMENT — COGNITIVE AND FUNCTIONAL STATUS - GENERAL
CLIMB 3 TO 5 STEPS WITH RAILING: A LITTLE
MOVING TO AND FROM BED TO CHAIR: A LITTLE
MOBILITY SCORE: 20
DAILY ACTIVITIY SCORE: 24
DAILY ACTIVITIY SCORE: 24
STANDING UP FROM CHAIR USING ARMS: A LITTLE
MOBILITY SCORE: 20
WALKING IN HOSPITAL ROOM: A LITTLE
CLIMB 3 TO 5 STEPS WITH RAILING: A LITTLE
WALKING IN HOSPITAL ROOM: A LITTLE
MOBILITY SCORE: 20
HELP NEEDED FOR BATHING: A LITTLE
PATIENT BASELINE BEDBOUND: NO
CLIMB 3 TO 5 STEPS WITH RAILING: A LITTLE
MOVING TO AND FROM BED TO CHAIR: A LITTLE
WALKING IN HOSPITAL ROOM: A LITTLE
DAILY ACTIVITIY SCORE: 23
MOVING TO AND FROM BED TO CHAIR: A LITTLE
STANDING UP FROM CHAIR USING ARMS: A LITTLE
STANDING UP FROM CHAIR USING ARMS: A LITTLE

## 2025-01-12 ASSESSMENT — LIFESTYLE VARIABLES
AUDIT-C TOTAL SCORE: -1
HOW OFTEN DO YOU HAVE A DRINK CONTAINING ALCOHOL: PATIENT UNABLE TO ANSWER
AUDIT-C TOTAL SCORE: -1
HOW OFTEN DO YOU HAVE 6 OR MORE DRINKS ON ONE OCCASION: PATIENT UNABLE TO ANSWER
SKIP TO QUESTIONS 9-10: 0
HOW MANY STANDARD DRINKS CONTAINING ALCOHOL DO YOU HAVE ON A TYPICAL DAY: PATIENT UNABLE TO ANSWER

## 2025-01-12 ASSESSMENT — PAIN SCALES - GENERAL
PAINLEVEL_OUTOF10: 0 - NO PAIN
PAINLEVEL_OUTOF10: 0 - NO PAIN

## 2025-01-12 ASSESSMENT — PATIENT HEALTH QUESTIONNAIRE - PHQ9
SUM OF ALL RESPONSES TO PHQ9 QUESTIONS 1 & 2: 0
1. LITTLE INTEREST OR PLEASURE IN DOING THINGS: NOT AT ALL
2. FEELING DOWN, DEPRESSED OR HOPELESS: NOT AT ALL

## 2025-01-12 ASSESSMENT — ACTIVITIES OF DAILY LIVING (ADL)
WALKS IN HOME: NEEDS ASSISTANCE
GROOMING: NEEDS ASSISTANCE
PATIENT'S MEMORY ADEQUATE TO SAFELY COMPLETE DAILY ACTIVITIES?: NO
HEARING - LEFT EAR: FUNCTIONAL
TOILETING: NEEDS ASSISTANCE
HEARING - RIGHT EAR: FUNCTIONAL
JUDGMENT_ADEQUATE_SAFELY_COMPLETE_DAILY_ACTIVITIES: YES
FEEDING YOURSELF: NEEDS ASSISTANCE
ADEQUATE_TO_COMPLETE_ADL: YES
DRESSING YOURSELF: NEEDS ASSISTANCE
BATHING: NEEDS ASSISTANCE

## 2025-01-12 NOTE — ASSESSMENT & PLAN NOTE
Improved, now appears to be around baseline  Avoid nephrotoxic agents  Holding lisinopril 20  Nephrology following

## 2025-01-12 NOTE — ASSESSMENT & PLAN NOTE
Chest x-ray cardiomegaly, moderately large left and small right pleural effusion as well as possible small infrahilar alveolar infiltrate on the right  Remains on 5 L nasal cannula continue to wean  Lasix 40 mg IV twice daily  Echocardiogram yesterday showed ejection fraction of 50 to 55%  Cardiology consult

## 2025-01-12 NOTE — PROGRESS NOTES
Reid Cano is a 89 y.o. male on day 2 of admission presenting with Acute on chronic diastolic heart failure.      Subjective    89 y.o. male   PMH; HTN, HLD, CKD 3, AS, Kaposi's sarcoma.     Patient is feeling well, no N/V, no CP or SOB.          Objective          Vitals 24HR  Heart Rate:  [70-86]   Temp:  [36.3 °C (97.3 °F)-36.9 °C (98.4 °F)]   Resp:  [20]   BP: ()/(53-64)   SpO2:  [94 %-100 %]     Intake/Output last 3 Shifts:  No intake or output data in the 24 hours ending 01/12/25 1506    Physical Exam   General: alert, no diaphoresis   HENT: mucous membranes moist, external ears normal, no rhinorrhea   Eyes: no icterus or injection, no discharge   Lungs: Diffuse decreased bilateral breath sounds   Heart: RRR, systolic murmur, pitting LE edema BL   GI: abdomen soft, nontender, nondistended, BS present   MSK: no joint effusion or deformity   Skin: Kaposi's sarcoma lesions on bilateral lower extremity   Neuro: grossly normal cognition, motor strength, sensation    Relevant Results  {If you would like to pull in Medications, type .meds     If you would like to pull in Lab results for the last 24 hours, type .[id number]    If you would like to pull in Imaging results, type .[email or web address] :99}      {Link to Stroke Scoring tools - Link :99}       Assessment/Plan              Assessment & Plan  Acute on chronic diastolic heart failure    Chronic kidney disease (CKD), stage III (moderate) (Multi)    Kaposi sarcoma (Multi)    Troponin I above reference range    Essential hypertension, benign    Acute kidney injury superimposed on CKD (CMS-HCC)    Pneumonia    Acute hypoxic respiratory failure (Multi)      // CHF with volume overload.   Chest x-ray cardiomegaly, moderately large left and small right pleural effusion as well as possible small infrahilar alveolar infiltrate on the right.  Received 40 IV Lasix in the ED.  Echo 2020; 55-60%, Severe aortic valve stenosis, moderate to severe aortic valve cusp calcification.  Mild to moderate aortic valve regurgitation.  Echocardiogram pending  CT chest pending for pleural effusion  Lasix 40 mg IV BID.   I+O  Daily weight.   BP on the lower side to his usually high BP. No reported UOP, Cr is going up, hold lasix for now and monitor.         // CKD 3B  - Baseline Cr; 1.4-1.7 going back to 2017   - Baseline GFR; 40-45  - Avoid nephrotoxic medications including IV contrast if possible.   - Dose medications to eGFR.   - Check urine electrolytes;   - Renal US;   - UPCR;   - Etiology is likely;      // Anemia of CKD   - Hgb at goal.   - Check TSAT  - Check ferritin level.   - Check B12   - Check folic acid.   - Medications;      // CKD MBD.   - Check PTH.   - Check 25D   - Ca and phos at goal.   - Medications; cholecal 5000 units every day      // Metabolic acidosis      // HTN   Intake blood pressure 148/73, acceptable  Home meds; norvasc 5, lisinopril 20, toprol 50.   Continue to monitor.        Pneumonia  Acute hypoxic respiratory failure (Multi)  Kaposi sarcoma (Multi)        Rajesh Klein MD

## 2025-01-12 NOTE — PROGRESS NOTES
"Reid Cano is a 89 y.o. male on day 2 of admission presenting with Congestive heart failure, unspecified HF chronicity, unspecified heart failure type.    Subjective   Patient reports feeling improved today, and is alert and oriented.  Patient denies any shortness of breath, chest pain, dizziness, palpitations.       Objective     Physical Exam  Appearance: Alert, oriented, cooperative, in no acute distress.      Skin: Purpuric skin lesions on lower extremities     Eyes: Conjunctiva pink with no redness or exudates. Eyelids without lesions. No scleral icterus.      ENT: Hearing grossly intact. External inspection of ears without lesions or erythema. no nasal flaring. no tripoding, no drooling, no difficulty swallowing oral secretions.  Nasal cannula     Pulmonary: Decreased breath sounds in all lobes. No accessory muscle use or stridor. No difficulty completing a full sentence without taking a breath     Cardiac: Murmur heard best left sternal border, regular rhythm     Musculoskeletal: Bilateral lower extremity edema,      Neurological: no focal findings identified.     Psychiatric: Appropriate mood and affect.    Last Recorded Vitals  Blood pressure 106/60, pulse 73, temperature 36.9 °C (98.4 °F), temperature source Oral, resp. rate 20, height 1.676 m (5' 6\"), weight 67.1 kg (147 lb 14.9 oz), SpO2 100%.  Intake/Output last 3 Shifts:  I/O last 3 completed shifts:  In: 300 (4.5 mL/kg) [IV Piggyback:300]  Out: 300 (4.5 mL/kg) [Urine:300 (0.1 mL/kg/hr)]  Weight: 67.1 kg     Relevant Results  Results for orders placed or performed during the hospital encounter of 01/10/25 (from the past 24 hours)   Transthoracic Echo (TTE) Complete   Result Value Ref Range    AV pk fredis 4.59 m/s    LVOT diam 2.05 cm    AV mn grad 51 mmHg    MV E/A ratio 1.53     LV Biplane EF 62 %    LV EF 53 %    RVSP 52.6 mmHg    AV pk grad 84 mmHg    Aortic Valve Area by Continuity of VTI 0.51 cm2    Aortic Valve Area by Continuity of Peak Velocity " 0.50 cm2    LV A4C EF 58.6    POCT GLUCOSE   Result Value Ref Range    POCT Glucose 141 (H) 74 - 99 mg/dL   Renal Function Panel   Result Value Ref Range    Glucose 98 74 - 99 mg/dL    Sodium 136 136 - 145 mmol/L    Potassium 3.4 (L) 3.5 - 5.3 mmol/L    Chloride 101 98 - 107 mmol/L    Bicarbonate 25 21 - 32 mmol/L    Anion Gap 13 10 - 20 mmol/L    Urea Nitrogen 36 (H) 6 - 23 mg/dL    Creatinine 1.72 (H) 0.50 - 1.30 mg/dL    eGFR 36 (L) >60 mL/min/1.73m*2    Calcium 8.5 (L) 8.6 - 10.3 mg/dL    Phosphorus 4.5 2.5 - 4.9 mg/dL    Albumin 3.5 3.4 - 5.0 g/dL   CBC   Result Value Ref Range    WBC 6.7 4.4 - 11.3 x10*3/uL    nRBC 0.0 0.0 - 0.0 /100 WBCs    RBC 3.52 (L) 4.50 - 5.90 x10*6/uL    Hemoglobin 11.0 (L) 13.5 - 17.5 g/dL    Hematocrit 32.7 (L) 41.0 - 52.0 %    MCV 93 80 - 100 fL    MCH 31.3 26.0 - 34.0 pg    MCHC 33.6 32.0 - 36.0 g/dL    RDW 13.2 11.5 - 14.5 %    Platelets 247 150 - 450 x10*3/uL        Transthoracic Echo (TTE) Complete 01/11/2025   Transthoracic Echo (TTE) Complete    Result Date: 1/11/2025           [place] [address]            Phone [phone] TRANSTHORACIC ECHOCARDIOGRAM REPORT Patient Name:       JEANNINE Sheffield Physician:    [id number] [facility] Study Date:         1/11/2025            Ordering Provider:    Alisa PIMENTEL MRN/PID:            [id number]             Fellow: Accession#:         [id number]         Nurse: Date of Birth/Age:  2/12/1929 / 89 years Sonographer:          Ignacio Nunez RDCS Gender Assigned at  M                    Additional Staff: Birth: Height:             166.00 cm            Admit Date: Weight:             68.00 kg             Admission Status:     Inpatient -                                                                 Routine BSA / BMI:          1.76 m2 / 24.68      Department Location:  [facility] ER                     kg/m2 Blood Pressure: 145 /66 mmHg Study Type:    TRANSTHORACIC ECHO (TTE) COMPLETE Diagnosis/ICD: Cardiac murmur, unspecified-R01.1 Indication:    Murmur CPT Codes:     Echo Complete w Full [id number] Patient History: Pertinent History: Murmur, HTN, Hyperlipidemia and Chest Pain. PVD, Renal dx                    III. Study Detail: The following Echo studies were performed: 2D, M-Mode, Doppler and               color flow. Technically challenging study due to poor acoustic               windows and patient lying in supine position.  PHYSICIAN INTERPRETATION: Left Ventricle: The left ventricular systolic function is low normal, with a visually estimated ejection fraction of 50-55%. There is mild to moderate concentric left ventricular hypertrophy. There are no regional wall motion abnormalities. The left ventricular cavity size is normal. Spectral Doppler shows a Grade II (pseudonormal pattern) of left ventricular diastolic filling with an elevated left atrial pressure. Left Atrium: The left atrium is mildly dilated. Right Ventricle: The right ventricle is normal in size. There is normal right ventricular global systolic function. Right Atrium: The right atrium is normal in size. Aortic Valve: The aortic valve is probably trileaflet. There is evidence of severe aortic valve stenosis. The aortic valve dimensionless index is 0.15. There is moderate aortic valve regurgitation. The peak instantaneous gradient of the aortic valve is 84 mmHg. The mean gradient of the aortic valve is 51 mmHg. Mitral Valve: The mitral valve is moderately thickened. There is mild to moderate mitral annular calcification. There is mild mitral valve regurgitation. Tricuspid Valve: The tricuspid valve is structurally normal. There is mild tricuspid regurgitation. Pulmonic Valve: The pulmonic valve is not well visualized. The pulmonic  valve regurgitation was not well visualized. Pericardium: No pericardial effusion noted. Aorta: The aortic root is normal.  CONCLUSIONS:  1. The left ventricular systolic function is low normal, with a visually estimated ejection fraction of 50-55%.  2. No regional wall motion abnormalities.  3. Spectral Doppler shows a Grade II (pseudonormal pattern) of left ventricular diastolic filling with an elevated left atrial pressure.  4. There is normal right ventricular global systolic function.  5. The mitral valve is moderately thickened.  6. Severe aortic valve stenosis.  7. Moderate aortic valve regurgitation.  8. Aortic stenosis mean gradient 51 mm Hg, peak gradient 84 mm Hg and dimensionless index 0.15. QUANTITATIVE DATA SUMMARY:  2D MEASUREMENTS:         Normal Ranges: Ao Root s:       3.35 cm  LA VOLUME:                   Normal Ranges: LA Vol A4C:       76.5 ml LA Vol A2C:       124.1 ml LA Vol Index BSA: 57.1 ml/m2  RA VOLUME BY A/L METHOD:          Normal Ranges: RA Area A4C:             22.0 cm2  LV SYSTOLIC FUNCTION BY 2D PLANIMETRY (MOD):                      Normal Ranges: EF-A4C View:    59 % (>=55%) EF-A2C View:    56 % EF-Biplane:     62 % EF-Visual:      53 % EF-Auto:        56 % LV EF Reported: 53 %  LV DIASTOLIC FUNCTION:           Normal Ranges: MV Peak E:             1.19 m/s  (0.7-1.2 m/s) MV Peak A:             0.78 m/s  (0.42-0.7 m/s) E/A Ratio:             1.53      (1.0-2.2) MV e'                  0.064 m/s (>8.0) MV lateral e'          0.08 m/s MV medial e'           0.05 m/s E/e' Ratio:            18.60     (<8.0)  MITRAL VALVE:          Normal Ranges: MV DT:        144 msec (150-240msec)  MITRAL INSUFFICIENCY:             Normal Ranges: MR VTI:               194.96 cm MR Vmax:              651.18 cm/s  AORTIC VALVE:                      Normal Ranges: AoV Vmax:                4.59 m/s  (<=1.7m/s) AoV Peak P.3 mmHg (<20mmHg) AoV Mean P.0 mmHg (1.7-11.5mmHg)  LVOT Max Fabien:            0.69 m/s  (<=1.1m/s) AoV VTI:                 102.64 cm (18-25cm) LVOT VTI:                15.75 cm LVOT Diameter:           2.05 cm   (1.8-2.4cm) AoV Area, VTI:           0.51 cm2  (2.5-5.5cm2) AoV Area,Vmax:           0.50 cm2  (2.5-4.5cm2) AoV Dimensionless Index: 0.15  AORTIC INSUFFICIENCY: AI Vmax:       4.09 m/s AI Half-time:  357 msec AI Decel Time: 1230 msec AI Decel Rate: 333.06 cm/s2  RIGHT VENTRICLE: RV Basal 3.70 cm RV Mid   2.50 cm RV Major 6.9 cm  TRICUSPID VALVE/RVSP:          Normal Ranges: Peak TR Velocity:     3.52 m/s RV Syst Pressure:     53 mmHg  (< 30mmHg) IVC Diam:             1.35 cm  PULMONIC VALVE:          Normal Ranges: PV Max Fabien:     0.9 m/s  (0.6-0.9m/s) PV Max PG:      3.0 mmHg  [id number] [facility]  Electronically signed on 1/11/2025 at 1:47:14 PM  ** Final **         Assessment/Plan   Assessment & Plan  Congestive heart failure, unspecified HF chronicity, unspecified heart failure type    Chronic kidney disease (CKD), stage III (moderate) (Multi)    Kaposi sarcoma (Multi)    Troponin I above reference range    Essential hypertension, benign    Acute kidney injury superimposed on CKD (CMS-HCC)    Pneumonia    Acute hypoxic respiratory failure (Multi)    Severe/critical aortic valve stenosis  Moderate aortic valve regurgitation  Moderately thickened mitral valve  Hypertension  Chronic kidney disease stage III   Acute kidney injury  Elevated troponin  Pneumonia  Acute on Chronic Diastolic Congestive heart failure  Pleural effusion  Acute hypoxic respiratory failure        Reid Cano is a 89 y.o. male presenting with a PMH Kaposi sarcoma, hypertension, hyperlipidemia, CKD 3, aortic stenosis presenting brought in by EMS for evaluation of shortness of breath.  Patient's oxygen saturation upon arrival to the ER 82% on 4 L of nasal cannula, /73, troponins elevated 263, mildly elevated from first arrival, 170, sodium 134, potassium 3.9, creatinine 1.52, GFR 42,  BNP 3,620.  Chest x-ray in the ER shows cardiomegaly, moderately  large left and small right pleural effusion as well as small infrahilar infiltrate on the right side.  Patient was given ceftriaxone, azithromycin, aspirin and Lasix 40 mg IV in the ER.  Patient is a former smoker, currently drinks alcohol, denies any illicit drug use.  EKG shows normal sinus rhythm, ST abnormalities, LBBB.  Patient's last echocardiogram 3/2020, EF 55-60%, borderline increased left ventricular mass, impaired relaxation of left ventricle diastolic filling, dilation of left atrium, severe aortic stenosis, moderate to severe aortic valve cusp calcification, mild to moderate aortic regurgitation. his morning patient denies any shortness of breath, chest pain, dizziness at this time, and appears comfortable laying in bed. We were  consulted due to acute congestive diastolic heart failure, and elevated proBNP.  Elevated troponin at this time considered likely due to acute hypoxic respiratory failure/acute congestive diastolic heart failure. Echocardiogram has been ordered, further recommendations pending echo result.  Patient will continue with diuresis management by nephrology.     1/12: Patient is alert and oriented this morning, denies any chest pain, dizziness, palpitations, or shortness of breath.  Patient reports feeling well.  Patient was not on telemetry overnight.  Input and output were not reported for yesterday.  Patient was mildly hypotensive at 2130, 95/53, and was given 5 mg of midodrine, blood pressure this morning 106/60, pulse ox 100% on 6L oxygen via nasal cannula. HR 73. Patient denies any symptoms while hypotensive. Echo from yesterday reveals a ejection fraction of 50-55%, grade 2 pseudonormal pattern of left ventricular diastolic filling with elevated left atrial pressure, moderately thickened mitral valve, severe aortic valve stenosis, moderate aortic regurgitation, aortic stenosis mean gradient 51 mm Hg, peak  gradient 84 mm Hg, and dimensionless index 0.15.  Creatinine 1.72, sodium 136, potassium 3.4, BUN 36, hemoglobin 11.0.  Chest CT yesterday indicates Acute on Chronic Diastolic Congestive heart failure, four-chamber cardiomegaly, bilateral pleural effusion, interstitial infiltrates of the Kerley pattern, aortic valve calcifications, bibasilar atelectasis, and cyst of right lobe of the liver. Over 20 min of time with patient's daughter regarding severity of aortic valve stenosis and considerations for treatment. Patient's daughter will come in tomorrow to meet for further discussion.  Patient will continue with diuresis and we will continue to follow this patient with you.       I spent 50 minutes in the professional and overall care of this patient.     Vivi Wharton PA-C

## 2025-01-12 NOTE — PROGRESS NOTES
Reid Cano is a 89 y.o. male on day 2 of admission presenting with Congestive heart failure, unspecified HF chronicity, unspecified heart failure type.      Subjective   Patient states he feels much better today.  He continues with the Lasix for diuresis and cardiology is following him.       Objective     Last Recorded Vitals  /60 (BP Location: Right arm, Patient Position: Lying)   Pulse 73   Temp 36.9 °C (98.4 °F) (Oral)   Resp 20   Wt 67.1 kg (147 lb 14.9 oz)   SpO2 100%   Intake/Output last 3 Shifts:  No intake or output data in the 24 hours ending 01/12/25 1041    Admission Weight  Weight: 67.1 kg (147 lb 14.9 oz) (01/10/25 1937)    Daily Weight  01/10/25 : 67.1 kg (147 lb 14.9 oz)          Physical Exam    Relevant Results               Assessment/Plan                  Assessment & Plan  Acute on chronic diastolic heart failure  Chest x-ray cardiomegaly, moderately large left and small right pleural effusion as well as possible small infrahilar alveolar infiltrate on the right  Remains on 5 L nasal cannula continue to wean  Lasix 40 mg IV twice daily  Echocardiogram yesterday showed ejection fraction of 50 to 55%  Cardiology consult    Pneumonia  Currently on Rocephin and azithromycin  Sputum culture pending  Follow blood cultures  Acute hypoxic respiratory failure (Multi)  Secondary to acute CHF and pneumonia.  Continue supplemental oxygen, wean as tolerable.  Respiratory therapy  Bronchodilators.    Troponin I above reference range  Likely due to acute hypoxic respiratory failure  Cardiology is following  Acute kidney injury superimposed on CKD (CMS-HCC)  Improved, now appears to be around baseline  Avoid nephrotoxic agents  Holding lisinopril 20  Nephrology following  Kaposi sarcoma (Multi)  Chronic, present on lower extremities  Essential hypertension, benign  Hold lisinopril  Continue home amlodipine  DVT prophylaxis  Lovenox  Disposition  SNF for rehab versus home with home health care once  cleared  Care coordination has been consulted                BLAKE Francisco-CNP

## 2025-01-13 ENCOUNTER — APPOINTMENT (OUTPATIENT)
Dept: CARDIOLOGY | Facility: HOSPITAL | Age: OVER 89
End: 2025-01-13
Payer: MEDICARE

## 2025-01-13 LAB
ALBUMIN SERPL BCP-MCNC: 3.2 G/DL (ref 3.4–5)
ANION GAP SERPL CALCULATED.3IONS-SCNC: 12 MMOL/L (ref 10–20)
ATRIAL RATE: 62 BPM
ATRIAL RATE: 88 BPM
BUN SERPL-MCNC: 39 MG/DL (ref 6–23)
CALCIUM SERPL-MCNC: 8.3 MG/DL (ref 8.6–10.3)
CHLORIDE SERPL-SCNC: 106 MMOL/L (ref 98–107)
CO2 SERPL-SCNC: 23 MMOL/L (ref 21–32)
CREAT SERPL-MCNC: 1.72 MG/DL (ref 0.5–1.3)
EGFRCR SERPLBLD CKD-EPI 2021: 36 ML/MIN/1.73M*2
ERYTHROCYTE [DISTWIDTH] IN BLOOD BY AUTOMATED COUNT: 13.2 % (ref 11.5–14.5)
GLUCOSE SERPL-MCNC: 96 MG/DL (ref 74–99)
HCT VFR BLD AUTO: 31.2 % (ref 41–52)
HGB BLD-MCNC: 10.3 G/DL (ref 13.5–17.5)
MCH RBC QN AUTO: 31.2 PG (ref 26–34)
MCHC RBC AUTO-ENTMCNC: 33 G/DL (ref 32–36)
MCV RBC AUTO: 95 FL (ref 80–100)
NRBC BLD-RTO: 0 /100 WBCS (ref 0–0)
P AXIS: 34 DEGREES
P AXIS: 6 DEGREES
P OFFSET: 171 MS
P OFFSET: 180 MS
P ONSET: 122 MS
P ONSET: 125 MS
PHOSPHATE SERPL-MCNC: 4.5 MG/DL (ref 2.5–4.9)
PLATELET # BLD AUTO: 209 X10*3/UL (ref 150–450)
POTASSIUM SERPL-SCNC: 4.1 MMOL/L (ref 3.5–5.3)
PR INTERVAL: 164 MS
PR INTERVAL: 192 MS
Q ONSET: 204 MS
Q ONSET: 221 MS
QRS COUNT: 10 BEATS
QRS COUNT: 14 BEATS
QRS DURATION: 154 MS
QRS DURATION: 82 MS
QT INTERVAL: 408 MS
QT INTERVAL: 440 MS
QTC CALCULATION(BAZETT): 446 MS
QTC CALCULATION(BAZETT): 493 MS
QTC FREDERICIA: 445 MS
QTC FREDERICIA: 463 MS
R AXIS: 0 DEGREES
R AXIS: 43 DEGREES
RBC # BLD AUTO: 3.3 X10*6/UL (ref 4.5–5.9)
SODIUM SERPL-SCNC: 137 MMOL/L (ref 136–145)
T AXIS: 180 DEGREES
T AXIS: 76 DEGREES
T OFFSET: 408 MS
T OFFSET: 441 MS
VENTRICULAR RATE: 62 BPM
VENTRICULAR RATE: 88 BPM
WBC # BLD AUTO: 4.9 X10*3/UL (ref 4.4–11.3)

## 2025-01-13 PROCEDURE — 2500000002 HC RX 250 W HCPCS SELF ADMINISTERED DRUGS (ALT 637 FOR MEDICARE OP, ALT 636 FOR OP/ED): Performed by: STUDENT IN AN ORGANIZED HEALTH CARE EDUCATION/TRAINING PROGRAM

## 2025-01-13 PROCEDURE — 99232 SBSQ HOSP IP/OBS MODERATE 35: CPT | Performed by: NURSE PRACTITIONER

## 2025-01-13 PROCEDURE — 2500000004 HC RX 250 GENERAL PHARMACY W/ HCPCS (ALT 636 FOR OP/ED): Performed by: STUDENT IN AN ORGANIZED HEALTH CARE EDUCATION/TRAINING PROGRAM

## 2025-01-13 PROCEDURE — 2500000005 HC RX 250 GENERAL PHARMACY W/O HCPCS

## 2025-01-13 PROCEDURE — 36415 COLL VENOUS BLD VENIPUNCTURE: CPT | Performed by: NURSE PRACTITIONER

## 2025-01-13 PROCEDURE — 80069 RENAL FUNCTION PANEL: CPT | Performed by: NURSE PRACTITIONER

## 2025-01-13 PROCEDURE — 93005 ELECTROCARDIOGRAM TRACING: CPT

## 2025-01-13 PROCEDURE — 2500000001 HC RX 250 WO HCPCS SELF ADMINISTERED DRUGS (ALT 637 FOR MEDICARE OP): Performed by: STUDENT IN AN ORGANIZED HEALTH CARE EDUCATION/TRAINING PROGRAM

## 2025-01-13 PROCEDURE — 97530 THERAPEUTIC ACTIVITIES: CPT | Mod: GO

## 2025-01-13 PROCEDURE — 97166 OT EVAL MOD COMPLEX 45 MIN: CPT | Mod: GO

## 2025-01-13 PROCEDURE — 85027 COMPLETE CBC AUTOMATED: CPT | Performed by: NURSE PRACTITIONER

## 2025-01-13 PROCEDURE — 99223 1ST HOSP IP/OBS HIGH 75: CPT | Performed by: NURSE PRACTITIONER

## 2025-01-13 PROCEDURE — 2500000001 HC RX 250 WO HCPCS SELF ADMINISTERED DRUGS (ALT 637 FOR MEDICARE OP): Performed by: PHYSICIAN ASSISTANT

## 2025-01-13 PROCEDURE — 97162 PT EVAL MOD COMPLEX 30 MIN: CPT | Mod: GP

## 2025-01-13 PROCEDURE — 99497 ADVNCD CARE PLAN 30 MIN: CPT | Performed by: NURSE PRACTITIONER

## 2025-01-13 PROCEDURE — 2500000005 HC RX 250 GENERAL PHARMACY W/O HCPCS: Performed by: STUDENT IN AN ORGANIZED HEALTH CARE EDUCATION/TRAINING PROGRAM

## 2025-01-13 PROCEDURE — 1210000001 HC SEMI-PRIVATE ROOM DAILY

## 2025-01-13 RX ORDER — AMMONIUM LACTATE 12 G/100G
LOTION TOPICAL 2 TIMES DAILY
Status: DISCONTINUED | OUTPATIENT
Start: 2025-01-13 | End: 2025-01-17 | Stop reason: HOSPADM

## 2025-01-13 RX ORDER — FUROSEMIDE 40 MG/1
40 TABLET ORAL DAILY
Status: DISCONTINUED | OUTPATIENT
Start: 2025-01-13 | End: 2025-01-14

## 2025-01-13 RX ADMIN — METOPROLOL SUCCINATE 50 MG: 50 TABLET, EXTENDED RELEASE ORAL at 08:54

## 2025-01-13 RX ADMIN — ASPIRIN 81 MG: 81 TABLET, COATED ORAL at 08:54

## 2025-01-13 RX ADMIN — PANTOPRAZOLE SODIUM 40 MG: 40 TABLET, DELAYED RELEASE ORAL at 06:06

## 2025-01-13 RX ADMIN — NORTRIPTYLINE HYDROCHLORIDE 10 MG: 10 CAPSULE ORAL at 20:34

## 2025-01-13 RX ADMIN — SERTRALINE 50 MG: 50 TABLET, FILM COATED ORAL at 20:25

## 2025-01-13 RX ADMIN — Medication 3 L/MIN: at 08:02

## 2025-01-13 RX ADMIN — Medication 3 MG: at 20:25

## 2025-01-13 RX ADMIN — STANDARDIZED SENNA CONCENTRATE 8.6 MG: 8.6 TABLET ORAL at 20:25

## 2025-01-13 RX ADMIN — ENOXAPARIN SODIUM 30 MG: 30 INJECTION SUBCUTANEOUS at 08:54

## 2025-01-13 RX ADMIN — Medication 3 L/MIN: at 20:27

## 2025-01-13 RX ADMIN — AMLODIPINE BESYLATE 5 MG: 5 TABLET ORAL at 08:54

## 2025-01-13 ASSESSMENT — COGNITIVE AND FUNCTIONAL STATUS - GENERAL
PERSONAL GROOMING: A LITTLE
DAILY ACTIVITIY SCORE: 23
HELP NEEDED FOR BATHING: A LITTLE
EATING MEALS: A LITTLE
TURNING FROM BACK TO SIDE WHILE IN FLAT BAD: A LITTLE
DRESSING REGULAR LOWER BODY CLOTHING: A LITTLE
TOILETING: A LITTLE
CLIMB 3 TO 5 STEPS WITH RAILING: A LITTLE
DAILY ACTIVITIY SCORE: 23
MOBILITY SCORE: 18
MOBILITY SCORE: 21
STANDING UP FROM CHAIR USING ARMS: A LITTLE
MOVING FROM LYING ON BACK TO SITTING ON SIDE OF FLAT BED WITH BEDRAILS: A LITTLE
MOBILITY SCORE: 20
WALKING IN HOSPITAL ROOM: A LITTLE
STANDING UP FROM CHAIR USING ARMS: A LITTLE
MOVING TO AND FROM BED TO CHAIR: A LITTLE
EATING MEALS: A LITTLE
WALKING IN HOSPITAL ROOM: A LITTLE
CLIMB 3 TO 5 STEPS WITH RAILING: A LITTLE
MOVING TO AND FROM BED TO CHAIR: A LITTLE
MOVING TO AND FROM BED TO CHAIR: A LITTLE
DAILY ACTIVITIY SCORE: 20
CLIMB 3 TO 5 STEPS WITH RAILING: A LITTLE
STANDING UP FROM CHAIR USING ARMS: A LITTLE

## 2025-01-13 ASSESSMENT — PAIN SCALES - GENERAL
PAINLEVEL_OUTOF10: 0 - NO PAIN

## 2025-01-13 ASSESSMENT — ENCOUNTER SYMPTOMS
DIARRHEA: 0
ARTHRALGIAS: 0
FATIGUE: 1
NERVOUS/ANXIOUS: 0
SHORTNESS OF BREATH: 1
PALPITATIONS: 0
SORE THROAT: 0
COLOR CHANGE: 0
TROUBLE SWALLOWING: 0
WOUND: 0
WEAKNESS: 0
HEADACHES: 0
DIFFICULTY URINATING: 0
CONFUSION: 0
APPETITE CHANGE: 0
POLYPHAGIA: 0
SINUS PAIN: 0
FEVER: 0
DIZZINESS: 0
COUGH: 1
MYALGIAS: 0
ABDOMINAL DISTENTION: 0
NAUSEA: 0
EYE PAIN: 0
AGITATION: 0
HEMATURIA: 0
TREMORS: 0
ABDOMINAL PAIN: 0
CONSTIPATION: 0
POLYDIPSIA: 0
SEIZURES: 0
CHILLS: 0
ACTIVITY CHANGE: 0
SLEEP DISTURBANCE: 0

## 2025-01-13 ASSESSMENT — ACTIVITIES OF DAILY LIVING (ADL)
ADL_ASSISTANCE: INDEPENDENT
BATHING_ASSISTANCE: MINIMAL

## 2025-01-13 ASSESSMENT — PAIN - FUNCTIONAL ASSESSMENT
PAIN_FUNCTIONAL_ASSESSMENT: 0-10

## 2025-01-13 NOTE — PROGRESS NOTES
Physical Therapy    Physical Therapy Evaluation    Patient Name: Reid Cano  MRN: [id number]  Department: [address]  Room[facility]  Today's Date: 1/13/2025   Time Calculation  Start Time: 0951  Stop Time: 1015  Time Calculation (min): 24 min    Assessment/Plan   PT Assessment  PT Assessment Results: Decreased strength, Decreased endurance, Impaired balance, Decreased mobility, Decreased coordination, Impaired judgement, Decreased safety awareness, Impaired vision, Impaired hearing, Impaired sensation, Pain  Rehab Prognosis: Good  Barriers to Discharge Home: Physical needs  Physical Needs: Intermittent mobility assistance needed  Evaluation/Treatment Tolerance: Patient tolerated treatment well  Medical Staff Made Aware: Yes  Strengths: Ability to acquire knowledge  Barriers to Participation: Comorbidities  End of Session Communication: Bedside nurse  Assessment Comment: The patient is a 89 y.o. male admitted to the hospital for increasing SOB. The patient currently requires CGA to supervision for transfers and ambulation with no AD. The patient would continue to benefit from skilled therapy services to address functional deficits.  End of Session Patient Position: Up in chair, Alarm on  IP OR SWING BED PT PLAN  Inpatient or Swing Bed: Inpatient  PT Plan  Treatment/Interventions: Bed mobility, Transfer training, Gait training, Balance training, Neuromuscular re-education, Strengthening, Endurance training, Range of motion, Therapeutic exercise, Therapeutic activity  PT Plan: Ongoing PT  PT Frequency: 3 times per week  PT Discharge Recommendations: Low intensity level of continued care  Equipment Recommended upon Discharge: Straight cane  PT Recommended Transfer Status: Assist x1    Subjective   General Visit Information:  General  Reason for Referral: mobility impairment due to heart failure  Referred By: Ruperto Theodore MD  Past Medical History Relevant to Rehab: acute on chronic heart failure, hypoxic resp failure,  kaposi sarcoma, CK, Htn  Family/Caregiver Present: No  Co-Treatment: OT  Co-Treatment Reason: to maximize pt outcomes  Prior to Session Communication: Bedside nurse  Patient Position Received: Bed, 2 rail up, Alarm off, not on at start of session  Preferred Learning Style: verbal, visual  General Comment: Pt is a 89 year old male admitted with hypoxic resp failure/acute on chronic HF, PNA.  Home Living:  Home Living  Type of Home: House  Lives With: Alone  Home Adaptive Equipment: Cane  Home Layout: Multi-level  Home Access: Stairs to enter without rails  Entrance Stairs-Rails: None  Entrance Stairs-Number of Steps: 4  Prior Level of Function:  Prior Function Per Pt/Caregiver Report  Level of Rebuck: Independent with ADLs and functional transfers, Independent with homemaking with ambulation  Receives Help From: Family  ADL Assistance: Independent  Homemaking Assistance: Needs assistance (family assists as needed; provides transportation)  Ambulatory Assistance: Independent  Vocational: Retired  Prior Function Comments: pt indep PTA  Precautions:  Precautions  Hearing/Visual Limitations: glasses prn, mild [place]  Medical Precautions: Fall precautions, Oxygen therapy device and L/min (3L O2)  Precautions Comment: mild language barrier; speaks [place]/Moz[place]ican     Objective   Pain:  Pain Assessment  Pain Assessment: 0-10  0-10 (Numeric) Pain Score: 0 - No pain  Cognition:  Cognition  Overall Cognitive Status: Within Functional Limits  Orientation Level: Other (Comment) (difficult to assess due to language barrier)  Insight: Mild  Impulsive: Mildly    General Assessments:  General Observation  General Observation: pleasant; agreeable to mobility     Activity Tolerance  Endurance: Tolerates less than 10 min exercise, no significant change in vital signs    Sensation  Light Touch: No apparent deficits    Strength  Strength Comments: BLE grossly >3+/5  Coordination  Coordination Comment: mildly increased time and effort  for mobility    Postural Control  Posture Comment: forward head posture    Static Standing Balance  Static Standing-Balance Support: No upper extremity supported  Static Standing-Level of Assistance: Close supervision  Static Standing-Comment/Number of Minutes: Narrow SAM  Dynamic Standing Balance  Dynamic Standing-Balance Support: No upper extremity supported  Dynamic Standing-Level of Assistance: Contact guard  Dynamic Standing-Comments: CGA for ambulation with no AD; intermittent reaching for humphreys rail  Functional Assessments:  Bed Mobility  Bed Mobility: Yes  Bed Mobility 1  Bed Mobility 1: Supine to sitting  Level of Assistance 1: Close supervision  Bed Mobility Comments 1: HOB to 25 deg; use of bed rail    Transfers  Transfer: Yes  Transfer 1  Transfer From 1: Bed to, Stand to  Transfer to 1: Stand, Chair with arms  Technique 1: Sit to stand, Stand to sit  Transfer Level of Assistance 1: Close supervision  Trials/Comments 1: VCs for safe sequencing    Ambulation/Gait Training  Ambulation/Gait Training Performed: Yes  Ambulation/Gait Training 1  Surface 1: Level tile  Device 1: No device  Assistance 1: Contact guard  Quality of Gait 1: Narrow base of support, Shuffling gait  Comments/Distance (ft) 1: 30ftx2 with no AD and CGA; shuffled-like gait with ann minimal toe clearance.  Extremity/Trunk Assessments:  RLE   RLE :  (>3+/5)  LLE   LLE :  (>3+/5)  Outcome Measures:  [facility] Basic Mobility  Turning from your back to your side while in a flat bed without using bedrails: A little  Moving from lying on your back to sitting on the side of a flat bed without using bedrails: A little  Moving to and from bed to chair (including a wheelchair): A little  Standing up from a chair using your arms (e.g. wheelchair or bedside chair): A little  To walk in hospital room: A little  Climbing 3-5 steps with railing: A little  Basic Mobility - Total Score: 18    Encounter Problems       Encounter Problems (Active)       PT  Problem       Strength (Progressing)       Start:  01/13/25    Expected End:  02/13/25       The patient will demonstrate an overall strength of 4/5 in BLE to assist with completion of functional mobility.           Functional Mobility (Progressing)       Start:  01/13/25    Expected End:  02/13/25       The patient will complete functional mobility (bed mobility, transfers, etc.) at a mod indep level with LRAD by DC.           Ambulation (Progressing)       Start:  01/13/25    Expected End:  02/13/25       The patient will be able to ambulate at a mod indep level for >50ftx1 with LRAD.          Balance (Progressing)       Start:  01/13/25    Expected End:  02/13/25       The patient will demonstrate good dynamic standing balance during activity with LRAD.             Pain - Adult              Education Documentation  Mobility Training, taught by Selene Calixto PT at 1/13/2025 12:30 PM.  Learner: Patient  Readiness: Acceptance  Method: Explanation  Response: Verbalizes Understanding  Comment: Educated pt on PT POC    Education Comments  No comments found.

## 2025-01-13 NOTE — ASSESSMENT & PLAN NOTE
Secondary to acute CHF   Continue supplemental oxygen, wean as tolerable.  Respiratory therapy  Bronchodilators.

## 2025-01-13 NOTE — PROGRESS NOTES
Reid Cano is a 89 y.o. male on day 3 of admission presenting with Acute on chronic diastolic heart failure.      Subjective   Patient seen and examined. Patient was feeling well today, denies any new c/o.      Objective     Last Recorded Vitals  /51 (BP Location: Right arm, Patient Position: Lying)   Pulse 80   Temp 36.7 °C (98.1 °F) (Oral)   Resp 18   Wt 67.1 kg (147 lb 14.9 oz)   SpO2 95%   Intake/Output last 3 Shifts:  No intake or output data in the 24 hours ending 01/13/25 1359    Admission Weight  Weight: 67.1 kg (147 lb 14.9 oz) (01/10/25 1937)    Daily Weight  01/10/25 : 67.1 kg (147 lb 14.9 oz)          Physical Exam  Vitals reviewed.   Constitutional:       Appearance: He is ill-appearing.   HENT:      Head: Normocephalic.      Nose: Nose normal.      Mouth/Throat:      Mouth: Mucous membranes are moist.   Eyes:      Extraocular Movements: Extraocular movements intact.   Cardiovascular:      Rate and Rhythm: Regular rhythm.   Pulmonary:      Comments: Diminished throughout   Abdominal:      General: Bowel sounds are normal.   Musculoskeletal:         General: Normal range of motion.      Cervical back: Neck supple.   Skin:     General: Skin is warm.   Neurological:      Mental Status: He is alert and oriented to person, place, and time.         Relevant Results             ECG 12 lead    Result Date: 1/13/2025  Normal sinus rhythm Nonspecific ST and T wave abnormality Abnormal ECG When compared with ECG of 10-YOLY-2025 19:24, (unconfirmed) Left bundle branch block is no longer Present Confirmed by Mart Cody ([id number]) on 1/13/2025 11:44:53 AM    ECG 12 lead    Result Date: 1/13/2025   Poor data quality, interpretation may be adversely affected Normal sinus rhythm Left bundle branch block Abnormal ECG When compared with ECG of 21-NOV-2024 12:05, Left bundle branch block is now Present Minimal criteria for Anterior infarct are no longer Present Confirmed by Mart Cody ([id number]) on 1/13/2025  11:36:45 AM    US renal complete    Result Date: 1/12/2025  Interpreted By:  Ronda Tiwari, STUDY: US RENAL COMPLETE; 1/11/2025 5:08 pm   INDICATION: Chronic kidney disease   COMPARISON: None.   ACCESSION NUMBER(S): [id number]   ORDERING CLINICIAN: SANJAY PALACIO   TECHNIQUE: Grayscale and color Doppler imaging of the kidneys.   FINDINGS: The right kidney measures 8.3 cm in length. There is a 1.0 x 1.1 x 1.1 cm exophytic right renal cyst. The left kidney measures 8.6 cm in length. There is no shadowing calculus, hydronephrosis, or solid mass identified. The renal cortical thickness is normal with slight increase in renal cortical echogenicity appreciated.   Incidental note is made of cholelithiasis.   Urinary bladder is not adequately distended for evaluation.       Mild increase in renal cortical echogenicity which may indicate medical renal disease. The kidneys are symmetrically small in size without renal cortical thinning or hydronephrosis.   1 cm exophytic right renal cyst.   Cholelithiasis.   MACRO: None.   Signed by: Ronda Tiwari 1/12/2025 5:25 AM Dictation workstation:   [id number]    Transthoracic Echo (TTE) Complete    Result Date: 1/11/2025           [place] [address]            Phone [phone] TRANSTHORACIC ECHOCARDIOGRAM REPORT Patient Name:       JEANNINE GARDNER         Yohannes Physician:    [id number] Mart Cody DO Study Date:         1/11/2025            Ordering Provider:    [id number] PIMENTEL MRN/PID:            [id number]             Fellow: Accession#:         [id number]         Nurse: Date of Birth/Age:  2/12/1929 / 89 years Sonographer:          Ignacio Nunez RDCS Gender Assigned at  M                    Additional Staff: Birth: Height:             166.00 cm             Admit Date: Weight:             68.00 kg             Admission Status:     Inpatient -                                                                Routine BSA / BMI:          1.76 m2 / 24.68      Department Location:  [facility] ER                     kg/m2 Blood Pressure: 145 /66 mmHg Study Type:    TRANSTHORACIC ECHO (TTE) COMPLETE Diagnosis/ICD: Cardiac murmur, unspecified-R01.1 Indication:    Murmur CPT Codes:     Echo Complete w Full [id number] Patient History: Pertinent History: Murmur, HTN, Hyperlipidemia and Chest Pain. PVD, Renal dx                    III. Study Detail: The following Echo studies were performed: 2D, M-Mode, Doppler and               color flow. Technically challenging study due to poor acoustic               windows and patient lying in supine position.  PHYSICIAN INTERPRETATION: Left Ventricle: The left ventricular systolic function is low normal, with a visually estimated ejection fraction of 50-55%. There is mild to moderate concentric left ventricular hypertrophy. There are no regional wall motion abnormalities. The left ventricular cavity size is normal. Spectral Doppler shows a Grade II (pseudonormal pattern) of left ventricular diastolic filling with an elevated left atrial pressure. Left Atrium: The left atrium is mildly dilated. Right Ventricle: The right ventricle is normal in size. There is normal right ventricular global systolic function. Right Atrium: The right atrium is normal in size. Aortic Valve: The aortic valve is probably trileaflet. There is evidence of severe aortic valve stenosis. The aortic valve dimensionless index is 0.15. There is moderate aortic valve regurgitation. The peak instantaneous gradient of the aortic valve is 84 mmHg. The mean gradient of the aortic valve is 51 mmHg. Mitral Valve: The mitral valve is moderately thickened. There is mild to moderate mitral annular calcification. There is mild mitral valve regurgitation. Tricuspid Valve: The  tricuspid valve is structurally normal. There is mild tricuspid regurgitation. Pulmonic Valve: The pulmonic valve is not well visualized. The pulmonic valve regurgitation was not well visualized. Pericardium: No pericardial effusion noted. Aorta: The aortic root is normal.  CONCLUSIONS:  1. The left ventricular systolic function is low normal, with a visually estimated ejection fraction of 50-55%.  2. No regional wall motion abnormalities.  3. Spectral Doppler shows a Grade II (pseudonormal pattern) of left ventricular diastolic filling with an elevated left atrial pressure.  4. There is normal right ventricular global systolic function.  5. The mitral valve is moderately thickened.  6. Severe aortic valve stenosis.  7. Moderate aortic valve regurgitation.  8. Aortic stenosis mean gradient 51 mm Hg, peak gradient 84 mm Hg and dimensionless index 0.15. QUANTITATIVE DATA SUMMARY:  2D MEASUREMENTS:         Normal Ranges: Ao Root s:       3.35 cm  LA VOLUME:                   Normal Ranges: LA Vol A4C:       76.5 ml LA Vol A2C:       124.1 ml LA Vol Index BSA: 57.1 ml/m2  RA VOLUME BY A/L METHOD:          Normal Ranges: RA Area A4C:             22.0 cm2  LV SYSTOLIC FUNCTION BY 2D PLANIMETRY (MOD):                      Normal Ranges: EF-A4C View:    59 % (>=55%) EF-A2C View:    56 % EF-Biplane:     62 % EF-Visual:      53 % EF-Auto:        56 % LV EF Reported: 53 %  LV DIASTOLIC FUNCTION:           Normal Ranges: MV Peak E:             1.19 m/s  (0.7-1.2 m/s) MV Peak A:             0.78 m/s  (0.42-0.7 m/s) E/A Ratio:             1.53      (1.0-2.2) MV e'                  0.064 m/s (>8.0) MV lateral e'          0.08 m/s MV medial e'           0.05 m/s E/e' Ratio:            18.60     (<8.0)  MITRAL VALVE:          Normal Ranges: MV DT:        144 msec (150-240msec)  MITRAL INSUFFICIENCY:             Normal Ranges: MR VTI:               194.96 cm MR Vmax:              651.18 cm/s  AORTIC VALVE:                      Normal  Ranges: AoV Vmax:                4.59 m/s  (<=1.7m/s) AoV Peak P.3 mmHg (<20mmHg) AoV Mean P.0 mmHg (1.7-11.5mmHg) LVOT Max Fabien:            0.69 m/s  (<=1.1m/s) AoV VTI:                 102.64 cm (18-25cm) LVOT VTI:                15.75 cm LVOT Diameter:           2.05 cm   (1.8-2.4cm) AoV Area, VTI:           0.51 cm2  (2.5-5.5cm2) AoV Area,Vmax:           0.50 cm2  (2.5-4.5cm2) AoV Dimensionless Index: 0.15  AORTIC INSUFFICIENCY: AI Vmax:       4.09 m/s AI Half-time:  357 msec AI Decel Time: 1230 msec AI Decel Rate: 333.06 cm/s2  RIGHT VENTRICLE: RV Basal 3.70 cm RV Mid   2.50 cm RV Major 6.9 cm  TRICUSPID VALVE/RVSP:          Normal Ranges: Peak TR Velocity:     3.52 m/s RV Syst Pressure:     53 mmHg  (< 30mmHg) IVC Diam:             1.35 cm  PULMONIC VALVE:          Normal Ranges: PV Max Fabien:     0.9 m/s  (0.6-0.9m/s) PV Max PG:      3.0 mmHg  [id number] Mart Cody DO Electronically signed on 2025 at 1:47:14 PM  ** Final **     CT chest wo IV contrast    Result Date: 2025  Interpreted By:  Mehdi Mann, STUDY: CT CHEST WO IV CONTRAST; 2025 3:51 am   INDICATION: Signs/Symptoms:Pleural effusion, dyspnea.  CHF. Shortness of breath.   COMPARISON: Chest radiograph 01/10/2025   ACCESSION NUMBER(S): [id number]   ORDERING CLINICIAN: DAVID PIMENTEL   TECHNIQUE: The study was performed without intravenous contrast material as requested. A helical acquisition data was obtained with multiplanar reconstructions.   One or more of the following dose reduction techniques were used: Automated exposure control Adjustment of the mA and/or kV according to patient size, and/or use of iterative reconstruction technique.   FINDINGS: Within the limits of this unenhanced study no hilar or mediastinal lymphadenopathy is identified.  Four-chamber cardiomegaly is present. Bilateral pleural effusions are identified. Left pleural effusion is partially loculated anterolaterally, superomedially and  anteromedially. Interstitial infiltrates of the Kerley pattern are identified most no below the apices bilaterally. Patchy atelectasis is present within both lower lobes as well as within the right middle lobe and lingula.   Aortic valve calcifications present which could indicate underlying aortic valvular stenosis. Ascending thoracic aorta demonstrates a maximal diameter of 4.1 cm, slight aneurysmal dilatation. Descending thoracic aortic diameter is 2.8 cm.   Chest Wall: No chest wall masses are identified.   Skeletal System: No fractures or destructive lesions are identified. Flowing ossification of the anterior longitudinal ligament is present, consistent with diffuse idiopathic skeletal hyperostosis, DISH.   Upper Abdomen: There is a 1.6 cm cyst posterior segment right lobe of the liver near the dome. There is an accessory splenule at the splenic hilum. Upper pole renal cortical calcification is present on the right.       1. Bilateral pleural effusions are present with multiple areas of loculation of the pleural fluid within left hemithorax. 2. Interstitial infiltrate of the Kerley pattern is present suggesting interstitial edema along with cardiomegaly, suggestive of CHF. 3. Aneurysmal dilatation of the ascending thoracic aorta along with the aortic valve plane calcification which could indicate aortic valvular stenosis. 4. Bibasilar atelectasis. 5. DISH. 6. Cyst dome of the right lobe of the liver.   MACRO: none   Signed by: Mehdi Mann 1/11/2025 8:26 AM Dictation workstation:   [id number]    XR chest 1 view    Result Date: 1/10/2025  STUDY: Chest Radiograph;  Shortness of breath INDICATION: Shortness of breath. COMPARISON: 11/21/2024 XR Chest ACCESSION NUMBER(S): [id number] ORDERING CLINICIAN: LISSET CEBALLOS TECHNIQUE:  Frontal chest was obtained at 19:49 hours. FINDINGS: CARDIOMEDIASTINAL SILHOUETTE: The heart remains mildly enlarged but no definite or obvious vascular congestion is appreciated..   LUNGS: There is a moderately large left and a small right pleural effusion with a possible small infrahilar infiltrate on the right.  There is no pneumothorax..  ABDOMEN: No remarkable upper abdominal findings.  BONES: No acute osseous changes.    There is cardiomegaly with a moderately large left and a small right pleural effusion as well as a possible small infrahilar alveolar infiltrate on the right.. Signed by eRid Valiente MD      Results for orders placed or performed during the hospital encounter of 01/10/25 (from the past 24 hours)   Renal Function Panel   Result Value Ref Range    Glucose 96 74 - 99 mg/dL    Sodium 137 136 - 145 mmol/L    Potassium 4.1 3.5 - 5.3 mmol/L    Chloride 106 98 - 107 mmol/L    Bicarbonate 23 21 - 32 mmol/L    Anion Gap 12 10 - 20 mmol/L    Urea Nitrogen 39 (H) 6 - 23 mg/dL    Creatinine 1.72 (H) 0.50 - 1.30 mg/dL    eGFR 36 (L) >60 mL/min/1.73m*2    Calcium 8.3 (L) 8.6 - 10.3 mg/dL    Phosphorus 4.5 2.5 - 4.9 mg/dL    Albumin 3.2 (L) 3.4 - 5.0 g/dL   CBC   Result Value Ref Range    WBC 4.9 4.4 - 11.3 x10*3/uL    nRBC 0.0 0.0 - 0.0 /100 WBCs    RBC 3.30 (L) 4.50 - 5.90 x10*6/uL    Hemoglobin 10.3 (L) 13.5 - 17.5 g/dL    Hematocrit 31.2 (L) 41.0 - 52.0 %    MCV 95 80 - 100 fL    MCH 31.2 26.0 - 34.0 pg    MCHC 33.0 32.0 - 36.0 g/dL    RDW 13.2 11.5 - 14.5 %    Platelets 209 150 - 450 x10*3/uL    Scheduled medications  amLODIPine, 5 mg, oral, Daily  aspirin, 81 mg, oral, Daily  enoxaparin, 30 mg, subcutaneous, Daily  [Held by provider] furosemide, 40 mg, intravenous, q12h  [Held by provider] lisinopril, 20 mg, oral, Daily  metoprolol succinate XL, 50 mg, oral, Daily  nortriptyline, 10 mg, oral, Nightly  oxygen, , inhalation, Continuous - Inhalation  pantoprazole, 40 mg, oral, Daily before breakfast   Or  pantoprazole, 40 mg, intravenous, Daily before breakfast  polyethylene glycol, 17 g, oral, Daily  sennosides, 8.6 mg, oral, Nightly  sertraline, 50 mg, oral, Nightly      Continuous  "medications     PRN medications  PRN medications: acetaminophen **OR** acetaminophen **OR** acetaminophen, ipratropium-albuteroL, melatonin, ondansetron **OR** ondansetron   Assessment/Plan      This is a 89-year-old male with past medical history of HTN, HLD, CKD 3, AS, Kaposi's sarcoma and other comorbidities presented to the ED was found to saturating 82% on 4 L oxygen upon arrival.  Patient does not speak English well, he speaks [place] and Setswana.  Per EMS his shortness of breath started around 6 PM he had mentioned \" he is going to die\" a couple times today.  He also was anxious at that time.  Patient denies chest pain, abdominal pain, fever or chills.     Patient was seen and evaluated by cardiology. Patient with severe aortic valve stenosis, needs TAVR, patient declined surgical intervention. Palliative care consultation placed.     Patient was diuresed with IV Lasix. Currently on 2.5 LNC. Normally on RA.         Assessment & Plan  Acute on chronic diastolic heart failure  Chest x-ray cardiomegaly, moderately large left and small right pleural effusion as well as possible small infrahilar alveolar infiltrate on the right  Remains on 2.5 L nasal cannula continue to wean  Lasix 40 mg IV twice daily on hold 2/2 mild JAYDEN  Echocardiogram yesterday showed ejection fraction of 50 to 55%, severe Aortic valve stenosis   Cardiology following, recommending TAVR, patient declined. Palliative care consult placed.     Pneumonia  Discontinued Rocephin and azithromycin as symptoms more likely 2/2 HF  Follow blood cultures- sofar no growth  Acute hypoxic respiratory failure (Multi)  Secondary to acute CHF   Continue supplemental oxygen, wean as tolerable.  Respiratory therapy  Bronchodilators.    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   Will follow up with cardiology final recommendation    Samir Pierre, APRN-CNP

## 2025-01-13 NOTE — PROGRESS NOTES
01/13/25 1520   Discharge Planning   Living Arrangements  --    Support Systems  --    Type of Residence  --    Number of Stairs to Enter Residence  --    Expected Discharge Disposition Home     TCC spoke to patients son and daughter at bedside. States they plan to have a bed secured at [facility]. States he has to give them a deposit and wants pt to discharge from hospital straight to assisted living. TCC will reach out to the [facility] to find out what hospice company they use and send over clinicals

## 2025-01-13 NOTE — PROGRESS NOTES
Occupational Therapy    Evaluation/Treatment    Patient Name: Reid Cano  MRN: [id number]  Department: [address]  Room[facility]  Today's Date: 01/13/25  Time Calculation  Start Time: 0950  Stop Time: 1014  Time Calculation (min): 24 min       Assessment:  Prognosis: Good  Barriers to Discharge Home: Caregiver assistance  Caregiver Assistance: Patient lives alone and/or does not have reliable caregiver assistance  Evaluation/Treatment Tolerance: Patient tolerated treatment well  Medical Staff Made Aware: Yes  End of Session Communication: Bedside nurse  End of Session Patient Position: Up in chair, Alarm on  OT Assessment Results: Decreased ADL status, Decreased safe judgment during ADL, Decreased endurance, Decreased functional mobility, Decreased IADLs, Decreased upper extremity strength  Prognosis: Good  Barriers to Discharge: Decreased caregiver support  Evaluation/Treatment Tolerance: Patient tolerated treatment well  Medical Staff Made Aware: Yes  Strengths: Attitude of self, Premorbid level of function  Barriers to Participation: Comorbidities  Plan:  Treatment Interventions: ADL retraining, Functional transfer training, Endurance training, Patient/family training, Compensatory technique education, Equipment evaluation/education  OT Frequency: 4 times per week  OT Discharge Recommendations: Low intensity level of continued care  OT Recommended Transfer Status: Assist of 1  OT - OK to Discharge: Yes  Treatment Interventions: ADL retraining, Functional transfer training, Endurance training, Patient/family training, Compensatory technique education, Equipment evaluation/education    Subjective   Current Problem:  1. Congestive heart failure, unspecified HF chronicity, unspecified heart failure type  Transthoracic Echo (TTE) Complete    Transthoracic Echo (TTE) Complete      2. Acute hypoxic respiratory failure (Multi)        3. Troponin I above reference range  Transthoracic Echo (TTE) Complete    Transthoracic  Echo (TTE) Complete      4. Occlusion and stenosis of left carotid artery        5. Leg edema, right  Transthoracic Echo (TTE) Complete    Transthoracic Echo (TTE) Complete      6. Heart murmur, aortic  Transthoracic Echo (TTE) Complete    Transthoracic Echo (TTE) Complete      7. Aortic valve regurgitation, nonrheumatic  Transthoracic Echo (TTE) Complete    Transthoracic Echo (TTE) Complete      8. Aortic valve stenosis, etiology of cardiac valve disease unspecified  Transthoracic Echo (TTE) Complete    Transthoracic Echo (TTE) Complete      9. Cardiac murmur, unspecified  Transthoracic Echo (TTE) Complete        General:   OT Received On: 01/13/25  General  Reason for Referral: OT Eval and treat; acute on chronic HF/Resp failure  Referred By: Ruperto Theodore MD  Past Medical History Relevant to Rehab: Pt admitted with PNA, CHF, acute on chronic heart failure, hypoxic resp failure, kaposi sarcoma, CK, Htn  Family/Caregiver Present: No  Prior to Session Communication: Bedside nurse  Patient Position Received: Bed, 2 rail up  Preferred Learning Style: verbal, visual (ESL, demonstrated motions at times)  General Comment:  (Pt is a 89 year old male admitted with hypoxic resp failure/acute on chronic HF, PNA.)   Precautions:  Medical Precautions: Fall precautions, Oxygen therapy device and L/min (3L)  Precautions Comment:  (Primarily Russian/Pashto speaking, does understand some english)    Pain:  Pain Assessment  Pain Assessment: 0-10  0-10 (Numeric) Pain Score: 0 - No pain    Objective   Cognition:  Overall Cognitive Status:  (followed basic commands, some visaul cues d/t language barrier)      Love Agitation Sedation Scale  Love Agitation Sedation Scale (RASS): Alert and calm  Home Living:  Type of Home: House  Lives With: Alone  Home Adaptive Equipment: Cane  Home Layout: Multi-level  Home Access: Stairs to enter without rails  Entrance Stairs-Number of Steps: 4  Home Living Comments:  (independent with  ADL/IADL, assist for driving)  Prior Function:  Level of [place]:  (independent with ADL/IADL, assist for driving)    ADL:  Eating Assistance: Independent  Grooming Assistance: Stand by  Bathing Assistance: Minimal  UE Dressing Assistance: Stand by  LE Dressing Assistance: Minimal  Toileting Assistance with Device: Minimal    Activity Tolerance:  Endurance: Tolerates less than 10 min exercise, no significant change in vital signs  Functional Standing Tolerance:  Time: stand ~ 4-5 min without AD, minimal SOB  Bed Mobility/Transfers: Bed Mobility  Bed Mobility:  (CLose supervision for sup to sit)    Transfers  Transfer:  (close supervision without AD; sit <->stand, bed to chair)    Functional Mobility:  Functional Mobility  Functional Mobility Performed:  (close supervision without use of AD)  Sitting Balance:  Static Sitting Balance  Static Sitting-Level of Assistance: Distant supervision  Dynamic Sitting Balance  Dynamic Sitting-Level of Assistance: Distant supervision  Standing Balance:  Static Standing Balance  Static Standing-Level of Assistance: Close supervision  Dynamic Standing Balance  Dynamic Standing-Level of Assistance: Close supervision    Sensation:  Light Touch: No apparent deficits  Strength:  Strength Comments:  (B UE ~ 4-/5 throughout)    Coordination:  Movements are Fluid and Coordinated: Yes     Outcome Measures: [facility] Daily Activity  Putting on and taking off regular lower body clothing: A little  Bathing (including washing, rinsing, drying): A little  Putting on and taking off regular upper body clothing: None  Toileting, which includes using toilet, bedpan or urinal: A little  Taking care of personal grooming such as brushing teeth: A little  Eating Meals: None  Daily Activity - Total Score: 20    Education Documentation  ADL Training, taught by Marjorie Mccabe OT at 1/13/2025 11:52 AM.  Learner: Patient  Readiness: Acceptance  Method: Explanation  Response: Verbalizes  Understanding    Education Comments  No comments found.    OP EDUCATION:     Goals:  Encounter Problems       Encounter Problems (Active)       ADL       Goal 1 (Progressing)       Start:  01/13/25    Expected End:  01/31/25       Patient will demonstrate improved ADL skills:  Bathing with supervision assist with DME prn     Grooming with mod I   UE Dressing with Mod I            LE Dressing with supervision assist    Toileting with supervision

## 2025-01-13 NOTE — ASSESSMENT & PLAN NOTE
Discontinued Rocephin and azithromycin as symptoms more likely 2/2 HF  Follow blood cultures- sofar no growth

## 2025-01-13 NOTE — PROGRESS NOTES
"Reid Cano is a 89 y.o. male on day 3 of admission presenting with Acute on chronic diastolic heart failure.    Subjective   Alert, denies complaints of pain.  No distress       Objective     Physical Exam  Vitals and nursing note reviewed.   Constitutional:       General: He is not in acute distress.     Appearance: He is ill-appearing. He is not toxic-appearing.   HENT:      Head: Normocephalic and atraumatic.      Nose: Nose normal.      Mouth/Throat:      Mouth: Mucous membranes are moist.      Pharynx: Oropharynx is clear.   Cardiovascular:      Rate and Rhythm: Normal rate and regular rhythm.      Pulses: Normal pulses.      Heart sounds: Murmur heard.      No friction rub. No gallop.      Comments: 3-6 aortic systolic murmur  Pulmonary:      Effort: Pulmonary effort is normal.      Comments: Diminished in bases.  Oxygen nasal cannula.  No conversational dyspnea appreciated.  Abdominal:      General: Bowel sounds are normal.   Musculoskeletal:         General: Normal range of motion.      Cervical back: Normal range of motion.      Right lower leg: No edema.      Left lower leg: No edema.   Skin:     General: Skin is warm and dry.      Capillary Refill: Capillary refill takes less than 2 seconds.   Neurological:      Mental Status: He is alert and oriented to person, place, and time. Mental status is at baseline.   Psychiatric:         Mood and Affect: Mood normal.         Behavior: Behavior normal.         Thought Content: Thought content normal.         Judgment: Judgment normal.         Last Recorded Vitals  Blood pressure 116/51, pulse 80, temperature 36.7 °C (98.1 °F), temperature source Oral, resp. rate 18, height 1.676 m (5' 6\"), weight 67.1 kg (147 lb 14.9 oz), SpO2 95%.  Intake/Output last 3 Shifts:  I/O last 3 completed shifts:  In: 360 (5.4 mL/kg) [P.O.:360]  Out: - (0 mL/kg)   Weight: 67.1 kg     Relevant Results  Results for orders placed or performed during the hospital encounter of 01/10/25 " (from the past 24 hours)   Renal Function Panel   Result Value Ref Range    Glucose 96 74 - 99 mg/dL    Sodium 137 136 - 145 mmol/L    Potassium 4.1 3.5 - 5.3 mmol/L    Chloride 106 98 - 107 mmol/L    Bicarbonate 23 21 - 32 mmol/L    Anion Gap 12 10 - 20 mmol/L    Urea Nitrogen 39 (H) 6 - 23 mg/dL    Creatinine 1.72 (H) 0.50 - 1.30 mg/dL    eGFR 36 (L) >60 mL/min/1.73m*2    Calcium 8.3 (L) 8.6 - 10.3 mg/dL    Phosphorus 4.5 2.5 - 4.9 mg/dL    Albumin 3.2 (L) 3.4 - 5.0 g/dL   CBC   Result Value Ref Range    WBC 4.9 4.4 - 11.3 x10*3/uL    nRBC 0.0 0.0 - 0.0 /100 WBCs    RBC 3.30 (L) 4.50 - 5.90 x10*6/uL    Hemoglobin 10.3 (L) 13.5 - 17.5 g/dL    Hematocrit 31.2 (L) 41.0 - 52.0 %    MCV 95 80 - 100 fL    MCH 31.2 26.0 - 34.0 pg    MCHC 33.0 32.0 - 36.0 g/dL    RDW 13.2 11.5 - 14.5 %    Platelets 209 150 - 450 x10*3/uL              Assessment/Plan   Assessment & Plan  Acute on chronic diastolic heart failure    Chronic kidney disease (CKD), stage III (moderate) (Multi)    Kaposi sarcoma (Multi)    Troponin I above reference range    Essential hypertension, benign    Acute kidney injury superimposed on CKD (CMS-HCC)    Pneumonia    Acute hypoxic respiratory failure (Multi)      Severe/critical aortic valve stenosis  Moderate aortic valve regurgitation  Moderately thickened mitral valve  Hypertension  Chronic kidney disease stage III   Acute kidney injury  Elevated troponin  Pneumonia  Acute on Chronic Diastolic Congestive heart failure  Pleural effusion  Acute hypoxic respiratory failure        Reid Cano is a 89 y.o. male presenting with a PMH Kaposi sarcoma, hypertension, hyperlipidemia, CKD 3, aortic stenosis presenting brought in by EMS for evaluation of shortness of breath.  Patient's oxygen saturation upon arrival to the ER 82% on 4 L of nasal cannula, /73, troponins elevated 263, mildly elevated from first arrival, 170, sodium 134, potassium 3.9, creatinine 1.52, GFR 42, BNP 3,620.  Chest x-ray in the ER  shows cardiomegaly, moderately  large left and small right pleural effusion as well as small infrahilar infiltrate on the right side.  Patient was given ceftriaxone, azithromycin, aspirin and Lasix 40 mg IV in the ER.  Patient is a former smoker, currently drinks alcohol, denies any illicit drug use.  EKG shows normal sinus rhythm, ST abnormalities, LBBB.  Patient's last echocardiogram 3/2020, EF 55-60%, borderline increased left ventricular mass, impaired relaxation of left ventricle diastolic filling, dilation of left atrium, severe aortic stenosis, moderate to severe aortic valve cusp calcification, mild to moderate aortic regurgitation. his morning patient denies any shortness of breath, chest pain, dizziness at this time, and appears comfortable laying in bed. We were  consulted due to acute congestive diastolic heart failure, and elevated proBNP.  Elevated troponin at this time considered likely due to acute hypoxic respiratory failure/acute congestive diastolic heart failure. Echocardiogram has been ordered, further recommendations pending echo result.  Patient will continue with diuresis management by nephrology.      1/12: Patient is alert and oriented this morning, denies any chest pain, dizziness, palpitations, or shortness of breath.  Patient reports feeling well.  Patient was not on telemetry overnight.  Input and output were not reported for yesterday.  Patient was mildly hypotensive at 2130, 95/53, and was given 5 mg of midodrine, blood pressure this morning 106/60, pulse ox 100% on 6L oxygen via nasal cannula. HR 73. Patient denies any symptoms while hypotensive. Echo from yesterday reveals a ejection fraction of 50-55%, grade 2 pseudonormal pattern of left ventricular diastolic filling with elevated left atrial pressure, moderately thickened mitral valve, severe aortic valve stenosis, moderate aortic regurgitation, aortic stenosis mean gradient 51 mm Hg, peak gradient 84 mm Hg, and dimensionless  index 0.15.  Creatinine 1.72, sodium 136, potassium 3.4, BUN 36, hemoglobin 11.0.  Chest CT yesterday indicates Acute on Chronic Diastolic Congestive heart failure, four-chamber cardiomegaly, bilateral pleural effusion, interstitial infiltrates of the Kerley pattern, aortic valve calcifications, bibasilar atelectasis, and cyst of right lobe of the liver. Over 20 min of time with patient's daughter regarding severity of aortic valve stenosis and considerations for treatment. Patient's daughter will come in tomorrow to meet for further discussion.  Patient will continue with diuresis and we will continue to follow this patient with you.    1/13: Alert.  Denies complaints chest pain or pressure palpitations or feeling rapid heart rate.  No significant events on telemetry monitor overnight.  Hypotension has predominately resolved most recent pressure 116/51.  Current pulse ox 95%.  Creatinine 1.72 with a hemoglobin 10.3.  His lisinopril does remain on hold.  Continues on a combination of metoprolol as well as amlodipine.  He appears to be generally euvolemic.  At this point the patient has not taken diuretics in the outpatient setting.  I am meeting with the family today to discuss if the patient or the family are interested in further investigation for his severe/near critical aortic valve stenosis.  I did discuss this further with the patient today who has improved mentation.  He states he is 89 years old and this is not a good time for him to do this.  He does not wish to undergo surgical procedures.  I informed him that he most likely has 6 months to 1 year to live with as severe as his aortic valve is.  He states he is fine with this.  I have with the patient's daughter as well as son-in-law today.  We discussed thoroughly his severe/critical aortic valve stenosis.  They were discussed with the patient once more to ensure he fully understands the option he is selected for no intervention.  Anticipate the patient  will proceed with medical management only [place] for palliative/hospice for this patient now.  Believe the patient will likely require a low-dose diuretic in the outpatient setting.  Will not initiate today to allow the creatinine to further improve if possible.  Will follow with you.    I spent 35 minutes in the professional and overall care of this patient.      Henri Blue, APRN-CNP

## 2025-01-13 NOTE — ASSESSMENT & PLAN NOTE
Chest x-ray cardiomegaly, moderately large left and small right pleural effusion as well as possible small infrahilar alveolar infiltrate on the right  Remains on 2.5 L nasal cannula continue to wean  Lasix 40 mg IV twice daily on hold 2/2 mild JAYDEN  Echocardiogram yesterday showed ejection fraction of 50 to 55%, severe Aortic valve stenosis   Cardiology following, recommending TAVR, patient declined. Palliative care consult placed.

## 2025-01-13 NOTE — CONSULTS
"Nutrition Assessement Note    Nutrition Assessment    Reason for Assessment: Dietitian discretion (CHF)    Reason for Hospital Admission:  Reid Cano is a 89 y.o. male who is admitted for acute on chronic diastolic heart failure. Spoke with family at bedside. Family reported good PO intake. They stated pt had an episode of choking on his food Saturday night, encouraged for them to talk to MD/ RN. Discussed risks of aspiration if choking persists. No other nutrition related concerns at this time.     Malnutrition Screening Tool (MST)  Have you recently lost weight without trying?: No  Weight Loss Score: 0  Have you been eating poorly because of a decreased appetite?: No  Malnutrition Score: 0  Nutrition Screen  Stage 3 or 4 Pressure Injury or Multiple Non-Healing Wounds: No  Home Tube Feeding or Total Parenteral Nutrition (TPN): No  Dietitian Consult Needed: No    Past Medical History:   Diagnosis Date    JAYDEN (acute kidney injury) (CMS-formerly [facility]) 01/02/2024    Closed nondisplaced fracture of first cervical vertebra 01/26/2024    Dens fracture, closed, initial encounter (Multi) 01/01/2024    Dysphagia 01/26/2024    Edema of both lower legs 08/21/2023    Fall (on) (from) other stairs and steps, initial encounter 01/26/2024    Hip pain, right 12/19/2011    Multiple skin tears 01/02/2024    Onychomycosis 08/21/2023    Personal history of malignant neoplasm, unspecified 02/06/2017    History of Kaposi's sarcoma    Personal history of other diseases of the circulatory system     History of cardiac murmur    Pre-ulcerative calluses 08/21/2023      No past surgical history on file.    Nutrition History:  Food and Nutrient History: Family reported good PO intake and denies recent changes  Energy Intake: Poor < 50 %     Food Allergies/Intolerances:  None  GI Symptoms: None  Oral Problems: Swallowing difficulty    Anthropometrics:  Ht: 167.6 cm (5' 6\"), Wt: 67.1 kg (147 lb 14.9 oz), BMI: 23.89             Weight Change:  Daily " Weight  01/10/25 : 67.1 kg (147 lb 14.9 oz)  12/09/24 : 67.1 kg (147 lb 14.9 oz)  11/21/24 : 68 kg (150 lb)  07/29/24 : 69.5 kg (153 lb 3.5 oz)  11/06/23 : 71.5 kg (157 lb 10.1 oz)  03/08/23 : 72.2 kg (159 lb 4 oz)  08/30/22 : 72.2 kg (159 lb 4 oz)  10/07/21 : 73.1 kg (161 lb 4 oz)  04/12/21 : 75.8 kg (167 lb)     Weight History / % Weight Change: family reported UBW to be 147#. Records show a nonsignificant 10# (6.4%) weight loss over 1 year.  Significant Weight Loss: No          Nutrition Focused Physical Exam Findings:                       Nutrition Significant Labs:  Lab Results   Component Value Date    WBC 4.9 01/13/2025    HGB 10.3 (L) 01/13/2025    HCT 31.2 (L) 01/13/2025     01/13/2025    CHOL 164 03/30/2023    TRIG 67 03/30/2023    HDL 59.3 03/30/2023    ALT 6 (L) 01/10/2025    AST 12 01/10/2025     01/13/2025    K 4.1 01/13/2025     01/13/2025    CREATININE 1.72 (H) 01/13/2025    BUN 39 (H) 01/13/2025    CO2 23 01/13/2025    TSH 5.32 (H) 03/04/2020    INR 1.0 01/10/2025    HGBA1C 5.0 11/29/2018     Nutrition Specific Medications:  amLODIPine, 5 mg, oral, Daily  aspirin, 81 mg, oral, Daily  enoxaparin, 30 mg, subcutaneous, Daily  [Held by provider] furosemide, 40 mg, intravenous, q12h  [Held by provider] lisinopril, 20 mg, oral, Daily  metoprolol succinate XL, 50 mg, oral, Daily  nortriptyline, 10 mg, oral, Nightly  oxygen, , inhalation, Continuous - Inhalation  pantoprazole, 40 mg, oral, Daily before breakfast   Or  pantoprazole, 40 mg, intravenous, Daily before breakfast  polyethylene glycol, 17 g, oral, Daily  sennosides, 8.6 mg, oral, Nightly  sertraline, 50 mg, oral, Nightly      Dietary Orders (From admission, onward)       Start     Ordered    01/13/25 1143  Adult diet Regular, Cardiac; 70 gm fat; 2 - 3 grams Sodium; 1600 mL fluid  Diet effective now        Question Answer Comment   Diet type Regular    Diet type Cardiac    Fat restriction: 70 gm fat    Sodium restriction: 2 - 3  grams Sodium    Dietary fluid restriction / 24h: 1600 mL fluid        01/13/25 1142    01/12/25 2113  May Participate in Room Service  ( ROOM SERVICE MAY PARTICIPATE)  Once        Question:  .  Answer:  Yes    01/12/25 2112                     Estimated Needs:   Estimated Energy Needs  Total Energy Estimated Needs (kCal): 1678 kCal  Total Estimated Energy Need per Day (kCal/kg): 30 kCal/kg  Method for Estimating Needs: actual wt    Estimated Protein Needs  Total Protein Estimated Needs (g): 81 g  Total Protein Estimated Needs (g/kg): 1.2 g/kg  Method for Estimating Needs: actual wt    Estimated Fluid Needs  Total Fluid Estimated Needs (mL): 1678 mL  Method for Estimating Needs: 1 mL/kcal        Nutrition Diagnosis   Nutrition Diagnosis:       Nutrition Diagnosis  Patient has Nutrition Diagnosis: Yes  Diagnosis Status (1): New  Nutrition Diagnosis 1: Increased nutrient needs  Related to (1): increased metabolic demand  As Evidenced by (1): CHF       Nutrition Interventions/Recommendations   Nutrition Interventions and Recommendations:    Nutrition Prescription:  Individualized Nutrition Prescription Provided for : 1678 kcals, 81 g protein via diet    Nutrition Interventions:   Food and/or Nutrient Delivery Interventions  Interventions: Meals and snacks  Meals and Snacks: Fat-modified diet, Mineral-modified diet  Goal: provide diet as ordered    Education Documentation  No documentation found.           Nutrition Monitoring and Evaluation   Monitoring/Evaluation:   Food/Nutrient Related History Monitoring  Monitoring and Evaluation Plan: Energy intake  Energy Intake: Estimated energy intake  Criteria: pt to consume >/= 75% estimated needs    Body Composition/Growth/Weight History  Monitoring and Evaluation Plan: Weight  Weight: Measured weight  Criteria: pt will maintain wt at this time         Time Spent/Follow-up:   Follow Up  Time Spent (min): 30 minutes  Last Date of Nutrition Visit: 01/13/25  Nutrition  Follow-Up Needed?: 5-7 days  Follow up Comment: 1/17/25

## 2025-01-13 NOTE — PROGRESS NOTES
Reid Cano is a 89 y.o. male on day 3 of admission presenting with Acute on chronic diastolic heart failure.      Subjective   The patient is doing well with no complaints, he does have mild edema, his creatinine is at baseline.       Objective          Vitals 24HR  Heart Rate:  [65-80]   Temp:  [36.2 °C (97.2 °F)-36.7 °C (98.1 °F)]   Resp:  [17-18]   BP: ()/(51-55)   SpO2:  [95 %-100 %]       Intake/Output last 3 Shifts:  No intake or output data in the 24 hours ending 01/13/25 1457    Physical Exam  Constitutional:       General: He is awake. He is not in acute distress.  Cardiovascular:      Heart sounds:      No friction rub.   Pulmonary:      Effort: Pulmonary effort is normal.      Breath sounds: Normal breath sounds.   Abdominal:      General: Bowel sounds are normal.      Palpations: Abdomen is soft.      Tenderness: There is no guarding or rebound.   Musculoskeletal:      Comments: Mild peripheral edema   Neurological:      Mental Status: He is alert.         Relevant Results  Results for orders placed or performed during the hospital encounter of 01/10/25 (from the past 24 hours)   Renal Function Panel   Result Value Ref Range    Glucose 96 74 - 99 mg/dL    Sodium 137 136 - 145 mmol/L    Potassium 4.1 3.5 - 5.3 mmol/L    Chloride 106 98 - 107 mmol/L    Bicarbonate 23 21 - 32 mmol/L    Anion Gap 12 10 - 20 mmol/L    Urea Nitrogen 39 (H) 6 - 23 mg/dL    Creatinine 1.72 (H) 0.50 - 1.30 mg/dL    eGFR 36 (L) >60 mL/min/1.73m*2    Calcium 8.3 (L) 8.6 - 10.3 mg/dL    Phosphorus 4.5 2.5 - 4.9 mg/dL    Albumin 3.2 (L) 3.4 - 5.0 g/dL   CBC   Result Value Ref Range    WBC 4.9 4.4 - 11.3 x10*3/uL    nRBC 0.0 0.0 - 0.0 /100 WBCs    RBC 3.30 (L) 4.50 - 5.90 x10*6/uL    Hemoglobin 10.3 (L) 13.5 - 17.5 g/dL    Hematocrit 31.2 (L) 41.0 - 52.0 %    MCV 95 80 - 100 fL    MCH 31.2 26.0 - 34.0 pg    MCHC 33.0 32.0 - 36.0 g/dL    RDW 13.2 11.5 - 14.5 %    Platelets 209 150 - 450 x10*3/uL            Assessment/Plan      // CKD 3B  - Baseline Cr; 1.4-1.7 going back to 2017   His creatinine is at baseline, okay to resume an oral diuretic, Lasix 40 mg once a day.  Stop amlodipine due to swelling.  He does not have acute kidney injury.  Will sign off at this time.  We can follow-up with the patient in 2 weeks after discharge with renal function panel 1 week after discharge     // Anemia of CKD   - Hgb at goal.      // HTN   Controlled, resume diuretic as above and stop amlodipine due to edema    // CHF   Echo 2020; 55-60%, Severe aortic valve stenosis, moderate to severe aortic valve cusp calcification. Mild to moderate aortic valve regurgitation.  Diuretic management as above    Dominick Espinosa MD

## 2025-01-14 LAB — M PNEUMO IGM SER IA-ACNC: 0.14 U/L

## 2025-01-14 PROCEDURE — 97110 THERAPEUTIC EXERCISES: CPT | Mod: GP

## 2025-01-14 PROCEDURE — 99232 SBSQ HOSP IP/OBS MODERATE 35: CPT | Performed by: NURSE PRACTITIONER

## 2025-01-14 PROCEDURE — 2500000004 HC RX 250 GENERAL PHARMACY W/ HCPCS (ALT 636 FOR OP/ED): Performed by: STUDENT IN AN ORGANIZED HEALTH CARE EDUCATION/TRAINING PROGRAM

## 2025-01-14 PROCEDURE — 97535 SELF CARE MNGMENT TRAINING: CPT | Mod: GO,CO

## 2025-01-14 PROCEDURE — 2500000001 HC RX 250 WO HCPCS SELF ADMINISTERED DRUGS (ALT 637 FOR MEDICARE OP): Performed by: STUDENT IN AN ORGANIZED HEALTH CARE EDUCATION/TRAINING PROGRAM

## 2025-01-14 PROCEDURE — 2500000001 HC RX 250 WO HCPCS SELF ADMINISTERED DRUGS (ALT 637 FOR MEDICARE OP): Performed by: PHYSICIAN ASSISTANT

## 2025-01-14 PROCEDURE — 2500000001 HC RX 250 WO HCPCS SELF ADMINISTERED DRUGS (ALT 637 FOR MEDICARE OP): Performed by: NURSE PRACTITIONER

## 2025-01-14 PROCEDURE — 2500000002 HC RX 250 W HCPCS SELF ADMINISTERED DRUGS (ALT 637 FOR MEDICARE OP, ALT 636 FOR OP/ED): Performed by: STUDENT IN AN ORGANIZED HEALTH CARE EDUCATION/TRAINING PROGRAM

## 2025-01-14 PROCEDURE — 97116 GAIT TRAINING THERAPY: CPT | Mod: GP

## 2025-01-14 PROCEDURE — 2500000005 HC RX 250 GENERAL PHARMACY W/O HCPCS

## 2025-01-14 PROCEDURE — 97110 THERAPEUTIC EXERCISES: CPT | Mod: GO,CO

## 2025-01-14 PROCEDURE — 1210000001 HC SEMI-PRIVATE ROOM DAILY

## 2025-01-14 RX ORDER — FUROSEMIDE 20 MG/1
20 TABLET ORAL DAILY
Status: DISCONTINUED | OUTPATIENT
Start: 2025-01-14 | End: 2025-01-17 | Stop reason: HOSPADM

## 2025-01-14 RX ADMIN — PANTOPRAZOLE SODIUM 40 MG: 40 TABLET, DELAYED RELEASE ORAL at 05:44

## 2025-01-14 RX ADMIN — Medication 3 L/MIN: at 20:58

## 2025-01-14 RX ADMIN — ENOXAPARIN SODIUM 30 MG: 30 INJECTION SUBCUTANEOUS at 08:17

## 2025-01-14 RX ADMIN — METOPROLOL SUCCINATE 50 MG: 50 TABLET, EXTENDED RELEASE ORAL at 08:17

## 2025-01-14 RX ADMIN — FUROSEMIDE 20 MG: 20 TABLET ORAL at 08:17

## 2025-01-14 RX ADMIN — STANDARDIZED SENNA CONCENTRATE 8.6 MG: 8.6 TABLET ORAL at 20:25

## 2025-01-14 RX ADMIN — NORTRIPTYLINE HYDROCHLORIDE 10 MG: 10 CAPSULE ORAL at 20:25

## 2025-01-14 RX ADMIN — Medication: at 20:25

## 2025-01-14 RX ADMIN — ASPIRIN 81 MG: 81 TABLET, COATED ORAL at 08:17

## 2025-01-14 RX ADMIN — SERTRALINE 50 MG: 50 TABLET, FILM COATED ORAL at 20:25

## 2025-01-14 ASSESSMENT — COGNITIVE AND FUNCTIONAL STATUS - GENERAL
PERSONAL GROOMING: A LITTLE
CLIMB 3 TO 5 STEPS WITH RAILING: A LITTLE
HELP NEEDED FOR BATHING: A LITTLE
CLIMB 3 TO 5 STEPS WITH RAILING: A LITTLE
CLIMB 3 TO 5 STEPS WITH RAILING: A LITTLE
TURNING FROM BACK TO SIDE WHILE IN FLAT BAD: A LITTLE
MOBILITY SCORE: 21
MOVING TO AND FROM BED TO CHAIR: A LITTLE
DAILY ACTIVITIY SCORE: 20
STANDING UP FROM CHAIR USING ARMS: A LITTLE
DAILY ACTIVITIY SCORE: 23
DRESSING REGULAR LOWER BODY CLOTHING: A LITTLE
MOBILITY SCORE: 19
DAILY ACTIVITIY SCORE: 24
TOILETING: A LITTLE
WALKING IN HOSPITAL ROOM: A LITTLE
STANDING UP FROM CHAIR USING ARMS: A LITTLE
MOVING TO AND FROM BED TO CHAIR: A LITTLE
MOBILITY SCORE: 23
EATING MEALS: A LITTLE

## 2025-01-14 ASSESSMENT — PAIN SCALES - GENERAL
PAINLEVEL_OUTOF10: 0 - NO PAIN

## 2025-01-14 ASSESSMENT — PAIN - FUNCTIONAL ASSESSMENT: PAIN_FUNCTIONAL_ASSESSMENT: 0-10

## 2025-01-14 ASSESSMENT — PAIN SCALES - WONG BAKER: WONGBAKER_NUMERICALRESPONSE: NO HURT

## 2025-01-14 ASSESSMENT — ACTIVITIES OF DAILY LIVING (ADL): HOME_MANAGEMENT_TIME_ENTRY: 12

## 2025-01-14 NOTE — PROGRESS NOTES
Physical Therapy    Physical Therapy Treatment    Patient Name: Reid Cano  MRN: [id number]  Department: [address]  Room: [address] [place] [address]  Today's Date: 1/14/2025  Time Calculation  Start Time: 1004  Stop Time: 1027  Time Calculation (min): 23 min         Assessment/Plan   PT Assessment  PT Assessment Results: Decreased strength, Decreased endurance, Impaired balance, Decreased mobility, Decreased coordination, Impaired judgement, Decreased safety awareness, Impaired vision, Impaired hearing, Impaired sensation, Pain  Rehab Prognosis: Good  Barriers to Discharge Home: Physical needs  Physical Needs: Intermittent mobility assistance needed  Evaluation/Treatment Tolerance: Patient tolerated treatment well  Medical Staff Made Aware: Yes  Barriers to Participation:  (none)  End of Session Communication: Bedside nurse  Assessment Comment: pt is slowly progressing; increased gait distance noted. mild unsteadiness without assistive device. pt would benefit from using RW for longer distances.  End of Session Patient Position: Up in chair, Alarm on (call button in reach)  PT Plan  Inpatient/Swing Bed or Outpatient: Inpatient  PT Plan  Treatment/Interventions: Bed mobility, Transfer training, Gait training, Balance training, Neuromuscular re-education, Strengthening, Endurance training, Range of motion, Therapeutic exercise, Therapeutic activity  PT Plan: Ongoing PT  PT Frequency: 3 times per week  PT Discharge Recommendations: Low intensity level of continued care  Equipment Recommended upon Discharge: Wheeled walker  PT Recommended Transfer Status:  (CGA/MIN A)  PT - OK to Discharge: Yes      General Visit Information:   PT  Visit  PT Received On: 01/14/25  General  Reason for Referral: mobility impairment due to heart failure  Referred By: Ruperto Theodore MD  Past Medical History Relevant to Rehab: acute on chronic heart failure, hypoxic resp failure, kaposi sarcoma, CK, Htn  Family/Caregiver Present: No  Prior to Session  Communication: Bedside nurse  Patient Position Received: Bed, 3 rail up, Alarm on  General Comment: pt pleasant and willing to work with therapy; mild [place]    Subjective   Precautions:  Precautions  Medical Precautions: Fall precautions, Oxygen therapy device and L/min  Precautions Comment: + 2.5L of oxygen; educated and instructed pt in safety techniques during mobility        Objective   Pain:  Pain Assessment  Pain Assessment:  (0/10)    Cognition:  Cognition  Overall Cognitive Status: Within Functional Limits  Orientation Level: Oriented X4  Insight: Mild    Coordination:  Coordination Comment: short B step length with occasional sway noted         Activity Tolerance:  Activity Tolerance  Endurance:  (GOOD- activity tolerance)      Treatments:  Therapeutic Exercise  Therapeutic Exercise Performed:  (B LE AROM therex: AP, calf raises, LAQ, hip flexion, GS, and ABD x 10-15 reps. VC to stay on task. demonstration given)       Bed Mobility  Bed Mobility:  (supine to sit with CLOSE SUP. GOOD sitting balance on EOB.)      Ambulation/Gait Training Performed:  (pt amb 60' x 1 without assistive device and CGA/MIN A. pt presented with narrow SAM, decreased B arm swing and occasional postural sway. mild B flexed knees noted.)    Transfers  Transfer:  (sit <-> stand with MIN A. LOB upon standing.  VC for safe hand placement during stand to sit.)         Outcome Measures:  [facility] Basic Mobility  Turning from your back to your side while in a flat bed without using bedrails: None  Moving from lying on your back to sitting on the side of a flat bed without using bedrails: A little  Moving to and from bed to chair (including a wheelchair): A little  Standing up from a chair using your arms (e.g. wheelchair or bedside chair): A little  To walk in hospital room: A little  Climbing 3-5 steps with railing: A little  Basic Mobility - Total Score: 19        Encounter Problems       Encounter Problems (Active)       PT Problem        Strength (Progressing)       Start:  01/13/25    Expected End:  02/13/25       The patient will demonstrate an overall strength of 4/5 in BLE to assist with completion of functional mobility.           Functional Mobility (Progressing)       Start:  01/13/25    Expected End:  02/13/25       The patient will complete functional mobility (bed mobility, transfers, etc.) at a mod indep level with LRAD by DC.           Ambulation (Progressing)       Start:  01/13/25    Expected End:  02/13/25       The patient will be able to ambulate at a mod indep level for >50ftx1 with LRAD.          Balance (Progressing)       Start:  01/13/25    Expected End:  02/13/25       The patient will demonstrate good dynamic standing balance during activity with LRAD.             Pain - Adult

## 2025-01-14 NOTE — PROGRESS NOTES
01/14/25 1006   Discharge Planning   Expected Discharge Disposition Home     VM left with director of [facility] to find out if pt has bed available and to find out what hospice they are contracted with   Awaiting return call     UPDATE 1050: [facility] spoke to Director, states they will need to do onsite of patient and room will be ready on Thursday. Sending paperwork that will need to be filled out by CNP. Also states they well come all hospice company's. Larisa JONES updated at this time

## 2025-01-14 NOTE — PROGRESS NOTES
"Reid Cano is a 89 y.o. male on day 4 of admission presenting with Acute on chronic diastolic heart failure.    Subjective   Alert, oriented.  Blood pressure at 5200 heart rate.  Breathing comfortably on nasal cannula oxygen.       Objective     Physical Exam  Vitals and nursing note reviewed.   Constitutional:       Appearance: He is normal weight. He is ill-appearing.   HENT:      Head: Normocephalic and atraumatic.      Nose: Nose normal.      Mouth/Throat:      Mouth: Mucous membranes are moist.      Pharynx: Oropharynx is clear.   Cardiovascular:      Rate and Rhythm: Normal rate and regular rhythm.      Heart sounds: Murmur heard.      Comments: 3 out of 6 aortic systolic murmur  Pulmonary:      Effort: Pulmonary effort is normal.      Breath sounds: Normal breath sounds.      Comments: Breathing comfortably on nasal cannula oxygen.  No conversational dyspnea appreciated.  No tachypnea.  No pain with deep serration.  No crackles or wheezing.  Abdominal:      General: Bowel sounds are normal.      Palpations: Abdomen is soft.   Musculoskeletal:         General: Normal range of motion.      Cervical back: Normal range of motion.      Right lower leg: No edema.      Left lower leg: No edema.   Skin:     General: Skin is warm and dry.      Capillary Refill: Capillary refill takes less than 2 seconds.   Neurological:      Mental Status: He is alert and oriented to person, place, and time. Mental status is at baseline.   Psychiatric:         Mood and Affect: Mood normal.         Behavior: Behavior normal.         Thought Content: Thought content normal.         Judgment: Judgment normal.         Last Recorded Vitals  Blood pressure 132/67, pulse 88, temperature 36.9 °C (98.4 °F), temperature source Oral, resp. rate 16, height 1.676 m (5' 6\"), weight 67.1 kg (147 lb 14.9 oz), SpO2 95%.  Intake/Output last 3 Shifts:  I/O last 3 completed shifts:  In: 120 (1.8 mL/kg) [P.O.:120]  Out: 1 (0 mL/kg) [Stool:1]  Weight: " 67.1 kg     Relevant Results  No results found for this or any previous visit (from the past 24 hours).      Assessment/Plan   Assessment & Plan  Acute on chronic diastolic heart failure    Chronic kidney disease (CKD), stage III (moderate) (Multi)    Kaposi sarcoma (Multi)    Troponin I above reference range    Essential hypertension, benign    Acute kidney injury superimposed on CKD (CMS-HCC)    Pneumonia    Acute hypoxic respiratory failure (Multi)      Severe/critical aortic valve stenosis  Moderate aortic valve regurgitation  Moderately thickened mitral valve  Hypertension  Chronic kidney disease stage III   Acute kidney injury  Elevated troponin  Pneumonia  Acute on Chronic Diastolic Congestive heart failure  Pleural effusion  Acute hypoxic respiratory failure        Reid Cano is a 89 y.o. male presenting with a PMH Kaposi sarcoma, hypertension, hyperlipidemia, CKD 3, aortic stenosis presenting brought in by EMS for evaluation of shortness of breath.  Patient's oxygen saturation upon arrival to the ER 82% on 4 L of nasal cannula, /73, troponins elevated 263, mildly elevated from first arrival, 170, sodium 134, potassium 3.9, creatinine 1.52, GFR 42, BNP 3,620.  Chest x-ray in the ER shows cardiomegaly, moderately  large left and small right pleural effusion as well as small infrahilar infiltrate on the right side.  Patient was given ceftriaxone, azithromycin, aspirin and Lasix 40 mg IV in the ER.  Patient is a former smoker, currently drinks alcohol, denies any illicit drug use.  EKG shows normal sinus rhythm, ST abnormalities, LBBB.  Patient's last echocardiogram 3/2020, EF 55-60%, borderline increased left ventricular mass, impaired relaxation of left ventricle diastolic filling, dilation of left atrium, severe aortic stenosis, moderate to severe aortic valve cusp calcification, mild to moderate aortic regurgitation. his morning patient denies any shortness of breath, chest pain, dizziness at this  time, and appears comfortable laying in bed. We were  consulted due to acute congestive diastolic heart failure, and elevated proBNP.  Elevated troponin at this time considered likely due to acute hypoxic respiratory failure/acute congestive diastolic heart failure. Echocardiogram has been ordered, further recommendations pending echo result.  Patient will continue with diuresis management by nephrology.      1/12: Patient is alert and oriented this morning, denies any chest pain, dizziness, palpitations, or shortness of breath.  Patient reports feeling well.  Patient was not on telemetry overnight.  Input and output were not reported for yesterday.  Patient was mildly hypotensive at 2130, 95/53, and was given 5 mg of midodrine, blood pressure this morning 106/60, pulse ox 100% on 6L oxygen via nasal cannula. HR 73. Patient denies any symptoms while hypotensive. Echo from yesterday reveals a ejection fraction of 50-55%, grade 2 pseudonormal pattern of left ventricular diastolic filling with elevated left atrial pressure, moderately thickened mitral valve, severe aortic valve stenosis, moderate aortic regurgitation, aortic stenosis mean gradient 51 mm Hg, peak gradient 84 mm Hg, and dimensionless index 0.15.  Creatinine 1.72, sodium 136, potassium 3.4, BUN 36, hemoglobin 11.0.  Chest CT yesterday indicates Acute on Chronic Diastolic Congestive heart failure, four-chamber cardiomegaly, bilateral pleural effusion, interstitial infiltrates of the Kerley pattern, aortic valve calcifications, bibasilar atelectasis, and cyst of right lobe of the liver. Over 20 min of time with patient's daughter regarding severity of aortic valve stenosis and considerations for treatment. Patient's daughter will come in tomorrow to meet for further discussion.  Patient will continue with diuresis and we will continue to follow this patient with you.     1/13: Alert.  Denies complaints chest pain or pressure palpitations or feeling rapid  heart rate.  No significant events on telemetry monitor overnight.  Hypotension has predominately resolved most recent pressure 116/51.  Current pulse ox 95%.  Creatinine 1.72 with a hemoglobin 10.3.  His lisinopril does remain on hold.  Continues on a combination of metoprolol as well as amlodipine.  He appears to be generally euvolemic.  At this point the patient has not taken diuretics in the outpatient setting.  I am meeting with the family today to discuss if the patient or the family are interested in further investigation for his severe/near critical aortic valve stenosis.  I did discuss this further with the patient today who has improved mentation.  He states he is 89 years old and this is not a good time for him to do this.  He does not wish to undergo surgical procedures.  I informed him that he most likely has 6 months to 1 year to live with as severe as his aortic valve is.  He states he is fine with this.  I have with the patient's daughter as well as son-in-law today.  We discussed thoroughly his severe/critical aortic valve stenosis.  They were discussed with the patient once more to ensure he fully understands the option he is selected for no intervention.  Anticipate the patient will proceed with medical management only [place] for palliative/hospice for this patient now.  Believe the patient will likely require a low-dose diuretic in the outpatient setting.  Will not initiate today to allow the creatinine to further improve if possible.  Will follow with you.    1/14: Alert and oriented.  Denies complaints of chest pain or pressure palpitations or feeling of rapid heart rate.  Breathing comfortably on nasal cannula oxygen.  Has met with palliative and plans for palliative/hospice in the outpatient setting at a long-term living facility.  I thoroughly discussed with the patient's family yesterday options for aortic valve and it was determined between the family and the patient that the patient does  not wish to undergo any surgical procedures or valve replacement.  He states understanding that he may only have 6 months to year to live given the criticality of his aortic valve.  He states acceptance of this.  Blood pressure is stable.  Have stopped his  lisinopril permanently noting a mild JAYDEN.  Additionally have decreased his furosemide from 40 mg daily to 20 mg daily as the patient was not taking furosemide in the outpatient setting.  Otherwise his creatinine mildly increased to current of 1.72 .  Hemoglobin stable at 10.3.  Would continue on beta-blocker.  No further recommendations.  Will sign off.  Patient to follow-up with Dr. Cody in the outpatient setting in the next 1 to 2 weeks for reassessment    I spent 35 minutes in the professional and overall care of this patient.      Henri Blue, BLAKE-CNP

## 2025-01-14 NOTE — CONSULTS
"Inpatient consult to Palliative Care  Consult performed by: DEMETRIS Cain  Consult ordered by: DEMETRIS Bonner  Reason for consult: Goals of Care          Reason For Consult  Reason for Consult: communication / medical decision making     History Of Present Illness  Reid Cano is a 89 y.o. male with past medical history of HTN, HLD, CKD 3, AS, Kaposi's sarcoma and other comorbidities presented to the ED was found to saturating 82% on 4 L oxygen upon arrival.  Patient does not speak English well, he speaks [place] Islander and Estonian.  Per EMS his shortness of breath started around 6 PM he had mentioned \" he is going to die\" a couple times per son in law Holley.    In the ED on admission vitals notable for respiratory rate 31, /73, saturating 100% on BiPAP. After 2 hours of BiPAP patient was taken down to 4 L and stable at 97%. He was also given Rocephin and azithromycin in the ED, aspirin 324, Lasix 40 IV.   Seen by cardiology, nephrology. Repeat echo with reduced EF, severe AV Stenosis. Cardiology spoke to patient about TAVR today, patient declined any surgical intervention. Patient was initially diuresed with IV lasix, was switched to oral by cardiology.  He is currently on 3L NC. He denies dyspnea at rest. He denies chest pain.     Symptoms (0 - 10, Best to Worst)  [place] Symptom Assessment System  0-10 (Numeric) Pain Score: 0 - No pain    BM in last 48 hours? unknown    Emotional/Psychological/Spiritual Needs  Did not address today.   Serious Illness Conversation  What is your understanding now of where you are with your illness:  family and patient fully aware of prognosis, status of heart and aortic valve  How much information about what is likely to be ahead with your illness  would you like from me: fully disclose information to patient, daughter and son in law.   What are your most important goals if your health situation worsens:  quality of life is important to patient "   What are your biggest fears and worries about the future with your health:  patient was scared as he struggled to breath per Kishan. He does not want to suffer  What gives you strength as you think about the future with your illness:  support of family is crucial as patient struggles with language barrier  What abilities are so critical to your life that you can’t imagine living without them:  no suffering.   If you become sicker, how much are you willing to go through for the possibility of gaining more time:  patient would like to be conservative at this point in his life.   How much does your family know about your priorities and wishes:  family participating in discussion.     Personal/Social History    He reports that he has quit smoking. His smoking use included cigarettes. He has never used smokeless tobacco. He reports current alcohol use. He reports that he does not use drugs.    Functional Status    Ambulatory, independent in all adls, family assist with shopping, meals, he does not drive.       Caregiving/Caregiver Support  Does the patient require assistance in some or all components of his care, including coordination of medical care? Yes  If Yes, which person serves that role?  caregiver   Caregiver emotional or practical needs:      Past Medical History  He has a past medical history of JAYDEN (acute kidney injury) (CMS-[facility]) (01/02/2024), Closed nondisplaced fracture of first cervical vertebra (01/26/2024), Dens fracture, closed, initial encounter (Multi) (01/01/2024), Dysphagia (01/26/2024), Edema of both lower legs (08/21/2023), Fall (on) (from) other stairs and steps, initial encounter (01/26/2024), Hip pain, right (12/19/2011), Multiple skin tears (01/02/2024), Onychomycosis (08/21/2023), Personal history of malignant neoplasm, unspecified (02/06/2017), Personal history of other diseases of the circulatory system, and Pre-ulcerative calluses (08/21/2023).    Surgical History  He has no past  surgical history on file.     Family History  Family History   Problem Relation Name Age of Onset    Colon cancer Mother      Colon cancer Father      Colon cancer Brother       Allergies  Patient has no known allergies.    Review of Systems   Constitutional:  Positive for fatigue. Negative for activity change, appetite change, chills and fever.   HENT:  Negative for mouth sores, sinus pain, sore throat and trouble swallowing.    Eyes:  Negative for pain.   Respiratory:  Positive for cough and shortness of breath.    Cardiovascular:  Negative for chest pain, palpitations and leg swelling.   Gastrointestinal:  Negative for abdominal distention, abdominal pain, constipation, diarrhea and nausea.   Endocrine: Negative for polydipsia, polyphagia and polyuria.   Genitourinary:  Negative for difficulty urinating and hematuria.   Musculoskeletal:  Negative for arthralgias, gait problem and myalgias.   Skin:  Negative for color change, rash and wound.   Neurological:  Negative for dizziness, tremors, seizures, weakness and headaches.   Psychiatric/Behavioral:  Negative for agitation, confusion and sleep disturbance. The patient is not nervous/anxious.         Physical Exam  Vitals and nursing note reviewed.   Constitutional:       General: He is not in acute distress.     Appearance: He is ill-appearing.   HENT:      Head: Normocephalic and atraumatic.      Nose: Nose normal.      Mouth/Throat:      Mouth: Mucous membranes are moist.      Pharynx: Oropharynx is clear.   Eyes:      General: No scleral icterus.     Extraocular Movements: Extraocular movements intact.      Pupils: Pupils are equal, round, and reactive to light.   Cardiovascular:      Rate and Rhythm: Normal rate and regular rhythm.      Pulses: Normal pulses.      Heart sounds: Murmur heard.   Pulmonary:      Effort: Pulmonary effort is normal. No respiratory distress.      Breath sounds: Normal breath sounds.      Comments: NC3L  Abdominal:      General:  "Abdomen is flat. Bowel sounds are normal. There is no distension.      Palpations: Abdomen is soft.      Tenderness: There is no abdominal tenderness.   Musculoskeletal:         General: No swelling, tenderness, deformity or signs of injury. Normal range of motion.      Cervical back: Normal range of motion and neck supple.      Right lower leg: Edema present.      Left lower leg: Edema present.   Skin:     General: Skin is warm and dry.      Capillary Refill: Capillary refill takes 2 to 3 seconds.      Coloration: Skin is not jaundiced or pale.   Neurological:      General: No focal deficit present.      Mental Status: He is alert and oriented to person, place, and time.   Psychiatric:         Mood and Affect: Mood normal.         Last Recorded Vitals  Blood pressure 103/59, pulse 71, temperature 36.7 °C (98.1 °F), temperature source Oral, resp. rate 18, height 1.676 m (5' 6\"), weight 67.1 kg (147 lb 14.9 oz), SpO2 97%.    Relevant Results  Results for orders placed or performed during the hospital encounter of 01/10/25 (from the past 24 hours)   Renal Function Panel   Result Value Ref Range    Glucose 96 74 - 99 mg/dL    Sodium 137 136 - 145 mmol/L    Potassium 4.1 3.5 - 5.3 mmol/L    Chloride 106 98 - 107 mmol/L    Bicarbonate 23 21 - 32 mmol/L    Anion Gap 12 10 - 20 mmol/L    Urea Nitrogen 39 (H) 6 - 23 mg/dL    Creatinine 1.72 (H) 0.50 - 1.30 mg/dL    eGFR 36 (L) >60 mL/min/1.73m*2    Calcium 8.3 (L) 8.6 - 10.3 mg/dL    Phosphorus 4.5 2.5 - 4.9 mg/dL    Albumin 3.2 (L) 3.4 - 5.0 g/dL   CBC   Result Value Ref Range    WBC 4.9 4.4 - 11.3 x10*3/uL    nRBC 0.0 0.0 - 0.0 /100 WBCs    RBC 3.30 (L) 4.50 - 5.90 x10*6/uL    Hemoglobin 10.3 (L) 13.5 - 17.5 g/dL    Hematocrit 31.2 (L) 41.0 - 52.0 %    MCV 95 80 - 100 fL    MCH 31.2 26.0 - 34.0 pg    MCHC 33.0 32.0 - 36.0 g/dL    RDW 13.2 11.5 - 14.5 %    Platelets 209 150 - 450 x10*3/uL    ECG 12 lead    Result Date: 1/13/2025  Normal sinus rhythm Nonspecific ST and T " wave abnormality Abnormal ECG When compared with ECG of 10-YOLY-2025 19:24, (unconfirmed) Left bundle branch block is no longer Present Confirmed by Mart Cody ([id number]) on 1/13/2025 11:44:53 AM    ECG 12 lead    Result Date: 1/13/2025   Poor data quality, interpretation may be adversely affected Normal sinus rhythm Left bundle branch block Abnormal ECG When compared with ECG of 21-NOV-2024 12:05, Left bundle branch block is now Present Minimal criteria for Anterior infarct are no longer Present Confirmed by Mart Cody ([id number]) on 1/13/2025 11:36:45 AM    US renal complete    Result Date: 1/12/2025  Interpreted By:  Ronda Tiwari, STUDY: US RENAL COMPLETE; 1/11/2025 5:08 pm   INDICATION: Chronic kidney disease   COMPARISON: None.   ACCESSION NUMBER(S): [id number]   ORDERING CLINICIAN: SANJAY PALACIO   TECHNIQUE: Grayscale and color Doppler imaging of the kidneys.   FINDINGS: The right kidney measures 8.3 cm in length. There is a 1.0 x 1.1 x 1.1 cm exophytic right renal cyst. The left kidney measures 8.6 cm in length. There is no shadowing calculus, hydronephrosis, or solid mass identified. The renal cortical thickness is normal with slight increase in renal cortical echogenicity appreciated.   Incidental note is made of cholelithiasis.   Urinary bladder is not adequately distended for evaluation.       Mild increase in renal cortical echogenicity which may indicate medical renal disease. The kidneys are symmetrically small in size without renal cortical thinning or hydronephrosis.   1 cm exophytic right renal cyst.   Cholelithiasis.   MACRO: None.   Signed by: Ronda Tiwari 1/12/2025 5:25 AM Dictation workstation:   [id number]    Transthoracic Echo (TTE) Complete    Result Date: 1/11/2025           [place] [address]            Phone [phone] TRANSTHORACIC ECHOCARDIOGRAM REPORT Patient Name:       JEANNINE GARDNER         Reading Physician:    [id number] Mart Cody                                                                 DO Study Date:         1/11/2025            Ordering Provider:    [id number] DAVID PIMENTEL MRN/PID:            [id number]             Fellow: Accession#:         [id number]         Nurse: Date of Birth/Age:  2/12/1929 / 89 years Sonographer:          Ignacio Nunez RDCS Gender Assigned at  M                    Additional Staff: Birth: Height:             166.00 cm            Admit Date: Weight:             68.00 kg             Admission Status:     Inpatient -                                                                Routine BSA / BMI:          1.76 m2 / 24.68      Department Location:  [facility]                     kg/m2 Blood Pressure: 145 /66 mmHg Study Type:    TRANSTHORACIC ECHO (TTE) COMPLETE Diagnosis/ICD: Cardiac murmur, unspecified-R01.1 Indication:    Murmur CPT Codes:     Echo Complete w Full Doppler-[id number] Patient History: Pertinent History: Murmur, HTN, Hyperlipidemia and Chest Pain. PVD, Renal dx                    III. Study Detail: The following Echo studies were performed: 2D, M-Mode, Doppler and               color flow. Technically challenging study due to poor acoustic               windows and patient lying in supine position.  PHYSICIAN INTERPRETATION: Left Ventricle: The left ventricular systolic function is low normal, with a visually estimated ejection fraction of 50-55%. There is mild to moderate concentric left ventricular hypertrophy. There are no regional wall motion abnormalities. The left ventricular cavity size is normal. Spectral Doppler shows a Grade II (pseudonormal pattern) of left ventricular diastolic filling with an elevated left atrial pressure. Left Atrium: The left atrium is mildly dilated. Right Ventricle: The right ventricle is normal in size. There is normal right ventricular global systolic  function. Right Atrium: The right atrium is normal in size. Aortic Valve: The aortic valve is probably trileaflet. There is evidence of severe aortic valve stenosis. The aortic valve dimensionless index is 0.15. There is moderate aortic valve regurgitation. The peak instantaneous gradient of the aortic valve is 84 mmHg. The mean gradient of the aortic valve is 51 mmHg. Mitral Valve: The mitral valve is moderately thickened. There is mild to moderate mitral annular calcification. There is mild mitral valve regurgitation. Tricuspid Valve: The tricuspid valve is structurally normal. There is mild tricuspid regurgitation. Pulmonic Valve: The pulmonic valve is not well visualized. The pulmonic valve regurgitation was not well visualized. Pericardium: No pericardial effusion noted. Aorta: The aortic root is normal.  CONCLUSIONS:  1. The left ventricular systolic function is low normal, with a visually estimated ejection fraction of 50-55%.  2. No regional wall motion abnormalities.  3. Spectral Doppler shows a Grade II (pseudonormal pattern) of left ventricular diastolic filling with an elevated left atrial pressure.  4. There is normal right ventricular global systolic function.  5. The mitral valve is moderately thickened.  6. Severe aortic valve stenosis.  7. Moderate aortic valve regurgitation.  8. Aortic stenosis mean gradient 51 mm Hg, peak gradient 84 mm Hg and dimensionless index 0.15. QUANTITATIVE DATA SUMMARY:  2D MEASUREMENTS:         Normal Ranges: Ao Root s:       3.35 cm  LA VOLUME:                   Normal Ranges: LA Vol A4C:       76.5 ml LA Vol A2C:       124.1 ml LA Vol Index BSA: 57.1 ml/m2  RA VOLUME BY A/L METHOD:          Normal Ranges: RA Area A4C:             22.0 cm2  LV SYSTOLIC FUNCTION BY 2D PLANIMETRY (MOD):                      Normal Ranges: EF-A4C View:    59 % (>=55%) EF-A2C View:    56 % EF-Biplane:     62 % EF-Visual:      53 % EF-Auto:        56 % LV EF Reported: 53 %  LV DIASTOLIC  FUNCTION:           Normal Ranges: MV Peak E:             1.19 m/s  (0.7-1.2 m/s) MV Peak A:             0.78 m/s  (0.42-0.7 m/s) E/A Ratio:             1.53      (1.0-2.2) MV e'                  0.064 m/s (>8.0) MV lateral e'          0.08 m/s MV medial e'           0.05 m/s E/e' Ratio:            18.60     (<8.0)  MITRAL VALVE:          Normal Ranges: MV DT:        144 msec (150-240msec)  MITRAL INSUFFICIENCY:             Normal Ranges: MR VTI:               194.96 cm MR Vmax:              651.18 cm/s  AORTIC VALVE:                      Normal Ranges: AoV Vmax:                4.59 m/s  (<=1.7m/s) AoV Peak P.3 mmHg (<20mmHg) AoV Mean P.0 mmHg (1.7-11.5mmHg) LVOT Max Fabien:            0.69 m/s  (<=1.1m/s) AoV VTI:                 102.64 cm (18-25cm) LVOT VTI:                15.75 cm LVOT Diameter:           2.05 cm   (1.8-2.4cm) AoV Area, VTI:           0.51 cm2  (2.5-5.5cm2) AoV Area,Vmax:           0.50 cm2  (2.5-4.5cm2) AoV Dimensionless Index: 0.15  AORTIC INSUFFICIENCY: AI Vmax:       4.09 m/s AI Half-time:  357 msec AI Decel Time: 1230 msec AI Decel Rate: 333.06 cm/s2  RIGHT VENTRICLE: RV Basal 3.70 cm RV Mid   2.50 cm RV Major 6.9 cm  TRICUSPID VALVE/RVSP:          Normal Ranges: Peak TR Velocity:     3.52 m/s RV Syst Pressure:     53 mmHg  (< 30mmHg) IVC Diam:             1.35 cm  PULMONIC VALVE:          Normal Ranges: PV Max Fabien:     0.9 m/s  (0.6-0.9m/s) PV Max PG:      3.0 mmHg  [id number] Mart Cody DO Electronically signed on 2025 at 1:47:14 PM  ** Final **     CT chest wo IV contrast    Result Date: 2025  Interpreted By:  Mehdi Mann, STUDY: CT CHEST WO IV CONTRAST; 2025 3:51 am   INDICATION: Signs/Symptoms:Pleural effusion, dyspnea.  CHF. Shortness of breath.   COMPARISON: Chest radiograph 01/10/2025   ACCESSION NUMBER(S): [id number]   ORDERING CLINICIAN: DAVID PIMENTEL   TECHNIQUE: The study was performed without intravenous contrast material as  requested. A helical acquisition data was obtained with multiplanar reconstructions.   One or more of the following dose reduction techniques were used: Automated exposure control Adjustment of the mA and/or kV according to patient size, and/or use of iterative reconstruction technique.   FINDINGS: Within the limits of this unenhanced study no hilar or mediastinal lymphadenopathy is identified.  Four-chamber cardiomegaly is present. Bilateral pleural effusions are identified. Left pleural effusion is partially loculated anterolaterally, superomedially and anteromedially. Interstitial infiltrates of the Kerley pattern are identified most no below the apices bilaterally. Patchy atelectasis is present within both lower lobes as well as within the right middle lobe and lingula.   Aortic valve calcifications present which could indicate underlying aortic valvular stenosis. Ascending thoracic aorta demonstrates a maximal diameter of 4.1 cm, slight aneurysmal dilatation. Descending thoracic aortic diameter is 2.8 cm.   Chest Wall: No chest wall masses are identified.   Skeletal System: No fractures or destructive lesions are identified. Flowing ossification of the anterior longitudinal ligament is present, consistent with diffuse idiopathic skeletal hyperostosis, DISH.   Upper Abdomen: There is a 1.6 cm cyst posterior segment right lobe of the liver near the dome. There is an accessory splenule at the splenic hilum. Upper pole renal cortical calcification is present on the right.       1. Bilateral pleural effusions are present with multiple areas of loculation of the pleural fluid within left hemithorax. 2. Interstitial infiltrate of the Kerley pattern is present suggesting interstitial edema along with cardiomegaly, suggestive of CHF. 3. Aneurysmal dilatation of the ascending thoracic aorta along with the aortic valve plane calcification which could indicate aortic valvular stenosis. 4. Bibasilar atelectasis. 5. DISH. 6.  Cyst dome of the right lobe of the liver.   MACRO: none   Signed by: Mehdi Mann 1/11/2025 8:26 AM Dictation workstation:   [id number]    XR chest 1 view    Result Date: 1/10/2025  STUDY: Chest Radiograph;  Shortness of breath INDICATION: Shortness of breath. COMPARISON: 11/21/2024 XR Chest ACCESSION NUMBER(S): [id number] ORDERING CLINICIAN: LISSET CEBALLOS TECHNIQUE:  Frontal chest was obtained at 19:49 hours. FINDINGS: CARDIOMEDIASTINAL SILHOUETTE: The heart remains mildly enlarged but no definite or obvious vascular congestion is appreciated..  LUNGS: There is a moderately large left and a small right pleural effusion with a possible small infrahilar infiltrate on the right.  There is no pneumothorax..  ABDOMEN: No remarkable upper abdominal findings.  BONES: No acute osseous changes.    There is cardiomegaly with a moderately large left and a small right pleural effusion as well as a possible small infrahilar alveolar infiltrate on the right.. Signed by Reid Valiente MD       Assessment/Plan   IMP:    Acute respiratory Failure with hypoxia  Severe Aortic Valve Stenosis  DHF, grade II dysfunction  PNA  JAYDEN on CKD3  Palliative Care    DNRCCA/DNI/No ICU  Capable, language barrier.   Daughter Ludmila is stated HPOA, with son in law Kishan as stated alternate.   Has LW and DNR at home.     We reviewed cardiology discussion with family and patient earlier today. Family was able to repeat to me entire discussion accurately. They are aware of progressive nature of valve stenosis and we reviewed impact on quality of life and challenges moving forward including risk of repeated hospitalizations, falls, syncope, confusion, JAYDEN, acute respiratory failure and progressive fatigue. Family and patient are focusing on quality of life and we reviewed hospice services. At the same time, we discussed patient safety as he lives alone in his home. Son has already reached out to [facility] to set up contracts and  reserve a room. Ideally family would like to transition patient directly from hospital to [facility]. I have asked care coordination to discuss this plan with family. I have also asked care coordination to reach out to [facility] and confirm which hospice companies they prefer. I will then review the options with family and set up informational meeting.   Regarding code status, patient and family are in agreement that he does not want any life support type measures at this point. I placed a signed copy of DNRCCA/DNI/No ICU in chart.     Summary:   -plan for transition to [facility] when medically stable, not safe to return home. Family interested in hospice, but need jail preferences before setting up informational meeting.   -DNRCCA/DNI    Symptom Management  Pain: none  Medications recommended for pain?  No  Tiredness: mild-moderate  Nausea: none  Depression: na  Anxiety: na  Drowsiness: na  Appetite: fair  Wellbeing: improved from admission. Relatively functional prior to admission.   Dyspnea: with exertion  Intervention recommended for dyspnea?  no  Other: na  Intervention recommended for constipation?  No    I spent 75 minutes in the professional and overall care of this patient. Spent approx 35min in acp discussion with patient and family.       Larisa Paniagua, APRN-CNP

## 2025-01-14 NOTE — PROGRESS NOTES
Occupational Therapy    OT Treatment    Patient Name: Reid Cano  MRN: [id number]  Department: [address]  Room: [facility]423-B  Today's Date: 1/14/2025  Time Calculation  Start Time: 1304  Stop Time: 1328  Time Calculation (min): 24 min        Assessment:  OT Assessment: pt tolerated treatment well and is progressing towards OT POC. Pt would benefit from continued OT treatment to increase strength, endurance, and activity tolerance for ADLs  Prognosis: Good  Barriers to Discharge Home: Caregiver assistance  Caregiver Assistance: Patient lives alone and/or does not have reliable caregiver assistance  Evaluation/Treatment Tolerance: Patient tolerated treatment well  Medical Staff Made Aware: Yes  End of Session Communication: Bedside nurse  End of Session Patient Position: Bed, 3 rail up, Alarm on (call light in reach and all needs met)  OT Assessment Results: Decreased ADL status, Decreased safe judgment during ADL, Decreased endurance, Decreased functional mobility, Decreased IADLs, Decreased upper extremity strength  Prognosis: Good  Barriers to Discharge: Decreased caregiver support  Evaluation/Treatment Tolerance: Patient tolerated treatment well  Medical Staff Made Aware: Yes  Strengths: Ability to acquire knowledge, Attitude of self  Barriers to Participation: Comorbidities  Plan:  Treatment Interventions: ADL retraining, Functional transfer training, UE strengthening/ROM, Endurance training, Patient/family training, Compensatory technique education, Equipment evaluation/education  OT Frequency: 4 times per week  OT Discharge Recommendations: Low intensity level of continued care  Equipment Recommended upon Discharge: Wheeled walker  OT Recommended Transfer Status: Assist of 1  OT - OK to Discharge: Yes  Treatment Interventions: ADL retraining, Functional transfer training, UE strengthening/ROM, Endurance training, Patient/family training, Compensatory technique education, Equipment evaluation/education    Subjective  "  Previous Visit Info:  OT Last Visit  OT Received On: 01/14/25  General:  General  Reason for Referral: mobility impairment due to heart failure  Referred By: Ruperto Theodore MD  Past Medical History Relevant to Rehab: acute on chronic heart failure, hypoxic resp failure, kaposi sarcoma, CK, Htn  Family/Caregiver Present: No  Prior to Session Communication: Bedside nurse  Patient Position Received: Bed, 3 rail up, Alarm on  Preferred Learning Style: verbal, visual  General Comment: cleared by nursing for OT treatment; pt pleasant and agreeable to therapy  Precautions:  Hearing/Visual Limitations: glasses prn, mild [place]  Medical Precautions: Fall precautions, Oxygen therapy device and L/min (2.5L O2 via nasal cannula)  Precautions Comment: + 2.5L of oxygen; educated and instructed pt in safety techniques during functional mobility    Pain:  Pain Assessment  Pain Assessment: 0-10  0-10 (Numeric) Pain Score: 0 - No pain    Objective    Cognition:  Cognition  Overall Cognitive Status: Within Functional Limits  Orientation Level: Oriented X4    Activities of Daily Living:      Grooming  Grooming Level of Assistance: Close supervision  Grooming Where Assessed: Standing sinkside  Grooming Comments: pt engaged in washing hands, washing face, and combing hair while in stance at sinkside with supervision for safety    UE Bathing  UE Bathing Comments: pt declined all dressing and bathing tasks stating he was already \"clean\"    Bed Mobility/Transfers:      Transfers  Transfer: Yes  Transfer 1  Transfer From 1: Bed to  Transfer to 1: Stand  Technique 1: Sit to stand  Transfer Level of Assistance 1: Close supervision  Trials/Comments 1: verbal cues for hand placement  Transfers 2  Transfer From 2: Stand to  Transfer to 2: Bed  Technique 2: Stand to sit  Transfer Level of Assistance 2: Close supervision  Trials/Comments 2: cues for safe hand placement    Toilet Transfers  Toilet Transfer From: Bed  Toilet Transfer Type: To and " from  Toilet Transfer to: Standard toilet  Toilet Transfer Technique: Ambulating  Toilet Transfers: Supervision  Toilet Transfers Comments: utilizied grab bars; good balance and safety throughout    Functional Mobility:  Functional Mobility  Functional Mobility Performed: Yes  Functional Mobility 1  Surface 1: Level tile  Device 1: No device  Assistance 1: Close supervision, Contact guard  Comments 1: to and from bathroom with no device and good balance throughout; completed with increased time  Sitting Balance:  Static Sitting Balance  Static Sitting-Balance Support: Feet supported, No upper extremity supported  Static Sitting-Level of Assistance: Distant supervision  Static Sitting-Comment/Number of Minutes: good seated balance while on EOB  Standing Balance:  Dynamic Standing Balance  Dynamic Standing-Balance Support: No upper extremity supported  Dynamic Standing-Level of Assistance: Close supervision  Dynamic Standing-Balance: Forward lean, Reaching for objects  Dynamic Standing-Comments: grooming tasks while in stance at sinkside with good balance    Therapy/Activity:   Therapeutic Exercise  Therapeutic Exercise Performed: Yes  Therapeutic Exercise Activity 1: pt engaged in 10 reps x2 of BUE elbow flexion/extension while seated to increase endurance and activity tolerance for ADLs.  Therapeutic Exercise Activity 2: pt engaged in 10 reps x2 of BUE shoulder flexion/extension while seated to increase endurance and activity tolerance for ADLs.  Therapeutic Exercise Activity 3: pt engaged in 10 reps x1 of BUE shoulder abduction/adduction while seated to increase endurance and activity tolerance for ADLs.  Therapeutic Exercise Activity 4: pt engaged in 10 reps x2 of BUE scapular elevation/depression while seated to increase endurance and activity tolerance for ADLs.      Outcome Measures:  [facility] Daily Activity  Putting on and taking off regular lower body clothing: A little  Bathing (including washing, rinsing,  drying): A little  Putting on and taking off regular upper body clothing: None  Toileting, which includes using toilet, bedpan or urinal: A little  Taking care of personal grooming such as brushing teeth: A little  Eating Meals: None  Daily Activity - Total Score: 20        Education Documentation  ADL Training, taught by AMY Eason at 1/14/2025  2:14 PM.  Learner: Patient  Readiness: Acceptance  Method: Explanation  Response: Verbalizes Understanding  Comment: pt educated on OT POC, body mechanics, safety technqiues, and HEP to increase strength and endurance for ADLs.        Goals:  Encounter Problems       Encounter Problems (Active)       ADL       Goal 1 (Progressing)       Start:  01/13/25    Expected End:  01/31/25       Patient will demonstrate improved ADL skills:  Bathing with supervision assist with DME prn     Grooming with mod I   UE Dressing with Mod I            LE Dressing with supervision assist    Toileting with supervision

## 2025-01-14 NOTE — PROGRESS NOTES
Reid Cano is a 89 y.o. male on day 4 of admission presenting with Acute on chronic diastolic heart failure.      Subjective   Patient seen and examined. Patient was feeling well today, denies any new c/o. Hoped to be discharged today.      Objective     Last Recorded Vitals  /63 (BP Location: Left arm, Patient Position: Sitting)   Pulse 75   Temp 36.2 °C (97.2 °F) (Oral)   Resp 17   Wt 67.1 kg (147 lb 14.9 oz)   SpO2 91%   Intake/Output last 3 Shifts:    Intake/Output Summary (Last 24 hours) at 1/14/2025 1608  Last data filed at 1/14/2025 1219  Gross per 24 hour   Intake 795 ml   Output 1 ml   Net 794 ml       Admission Weight  Weight: 67.1 kg (147 lb 14.9 oz) (01/10/25 1937)    Daily Weight  01/10/25 : 67.1 kg (147 lb 14.9 oz)          Physical Exam  Vitals reviewed.   Constitutional:       Appearance: He is ill-appearing.   HENT:      Head: Normocephalic.      Nose: Nose normal.      Mouth/Throat:      Mouth: Mucous membranes are moist.   Eyes:      Extraocular Movements: Extraocular movements intact.   Cardiovascular:      Rate and Rhythm: Regular rhythm.   Pulmonary:      Comments: CTA  Abdominal:      General: Bowel sounds are normal.   Musculoskeletal:         General: Normal range of motion.      Cervical back: Neck supple.   Skin:     General: Skin is warm.   Neurological:      Mental Status: He is alert and oriented to person, place, and time.         Relevant Results             ECG 12 lead    Result Date: 1/13/2025  Normal sinus rhythm Nonspecific ST and T wave abnormality Abnormal ECG When compared with ECG of 10-YOLY-2025 19:24, (unconfirmed) Left bundle branch block is no longer Present Confirmed by Mart Cody ([id number]) on 1/13/2025 11:44:53 AM    ECG 12 lead    Result Date: 1/13/2025   Poor data quality, interpretation may be adversely affected Normal sinus rhythm Left bundle branch block Abnormal ECG When compared with ECG of 21-NOV-2024 12:05, Left bundle branch block is now Present  Minimal criteria for Anterior infarct are no longer Present Confirmed by Mart Cody ([id number]) on 1/13/2025 11:36:45 AM    US renal complete    Result Date: 1/12/2025  Interpreted By:  Ronda Tiwari, STUDY: US RENAL COMPLETE; 1/11/2025 5:08 pm   INDICATION: Chronic kidney disease   COMPARISON: None.   ACCESSION NUMBER(S): [id number]   ORDERING CLINICIAN: SANJAY PALACIO   TECHNIQUE: Grayscale and color Doppler imaging of the kidneys.   FINDINGS: The right kidney measures 8.3 cm in length. There is a 1.0 x 1.1 x 1.1 cm exophytic right renal cyst. The left kidney measures 8.6 cm in length. There is no shadowing calculus, hydronephrosis, or solid mass identified. The renal cortical thickness is normal with slight increase in renal cortical echogenicity appreciated.   Incidental note is made of cholelithiasis.   Urinary bladder is not adequately distended for evaluation.       Mild increase in renal cortical echogenicity which may indicate medical renal disease. The kidneys are symmetrically small in size without renal cortical thinning or hydronephrosis.   1 cm exophytic right renal cyst.   Cholelithiasis.   MACRO: None.   Signed by: Ronda Tiwari 1/12/2025 5:25 AM Dictation workstation:   [id number]    Transthoracic Echo (TTE) Complete    Result Date: 1/11/2025           [place] [address]            Phone [phone] TRANSTHORACIC ECHOCARDIOGRAM REPORT Patient Name:       JEANNINE Sheffield Physician:    [id number] Mart Cody DO Study Date:         1/11/2025            Ordering Provider:    [id number] PIMENTEL MRN/PID:            [id number]             Fellow: Accession#:         [id number]         Nurse: Date of Birth/Age:  2/12/1929 / 89 years Sonographer:          Ignacio Nunez                                                                 RDCS Gender Assigned at  M                    Additional Staff: Birth: Height:             166.00 cm            Admit Date: Weight:             68.00 kg             Admission Status:     Inpatient -                                                                Routine BSA / BMI:          1.76 m2 / 24.68      Department Location:  [facility] ER                     kg/m2 Blood Pressure: 145 /66 mmHg Study Type:    TRANSTHORACIC ECHO (TTE) COMPLETE Diagnosis/ICD: Cardiac murmur, unspecified-R01.1 Indication:    Murmur CPT Codes:     Echo Complete w Full [id number] Patient History: Pertinent History: Murmur, HTN, Hyperlipidemia and Chest Pain. PVD, Renal dx                    III. Study Detail: The following Echo studies were performed: 2D, M-Mode, Doppler and               color flow. Technically challenging study due to poor acoustic               windows and patient lying in supine position.  PHYSICIAN INTERPRETATION: Left Ventricle: The left ventricular systolic function is low normal, with a visually estimated ejection fraction of 50-55%. There is mild to moderate concentric left ventricular hypertrophy. There are no regional wall motion abnormalities. The left ventricular cavity size is normal. Spectral Doppler shows a Grade II (pseudonormal pattern) of left ventricular diastolic filling with an elevated left atrial pressure. Left Atrium: The left atrium is mildly dilated. Right Ventricle: The right ventricle is normal in size. There is normal right ventricular global systolic function. Right Atrium: The right atrium is normal in size. Aortic Valve: The aortic valve is probably trileaflet. There is evidence of severe aortic valve stenosis. The aortic valve dimensionless index is 0.15. There is moderate aortic valve regurgitation. The peak instantaneous gradient of the aortic valve is 84 mmHg. The mean gradient of the aortic valve is 51 mmHg. Mitral Valve: The mitral valve is moderately thickened. There is mild  to moderate mitral annular calcification. There is mild mitral valve regurgitation. Tricuspid Valve: The tricuspid valve is structurally normal. There is mild tricuspid regurgitation. Pulmonic Valve: The pulmonic valve is not well visualized. The pulmonic valve regurgitation was not well visualized. Pericardium: No pericardial effusion noted. Aorta: The aortic root is normal.  CONCLUSIONS:  1. The left ventricular systolic function is low normal, with a visually estimated ejection fraction of 50-55%.  2. No regional wall motion abnormalities.  3. Spectral Doppler shows a Grade II (pseudonormal pattern) of left ventricular diastolic filling with an elevated left atrial pressure.  4. There is normal right ventricular global systolic function.  5. The mitral valve is moderately thickened.  6. Severe aortic valve stenosis.  7. Moderate aortic valve regurgitation.  8. Aortic stenosis mean gradient 51 mm Hg, peak gradient 84 mm Hg and dimensionless index 0.15. QUANTITATIVE DATA SUMMARY:  2D MEASUREMENTS:         Normal Ranges: Ao Root s:       3.35 cm  LA VOLUME:                   Normal Ranges: LA Vol A4C:       76.5 ml LA Vol A2C:       124.1 ml LA Vol Index BSA: 57.1 ml/m2  RA VOLUME BY A/L METHOD:          Normal Ranges: RA Area A4C:             22.0 cm2  LV SYSTOLIC FUNCTION BY 2D PLANIMETRY (MOD):                      Normal Ranges: EF-A4C View:    59 % (>=55%) EF-A2C View:    56 % EF-Biplane:     62 % EF-Visual:      53 % EF-Auto:        56 % LV EF Reported: 53 %  LV DIASTOLIC FUNCTION:           Normal Ranges: MV Peak E:             1.19 m/s  (0.7-1.2 m/s) MV Peak A:             0.78 m/s  (0.42-0.7 m/s) E/A Ratio:             1.53      (1.0-2.2) MV e'                  0.064 m/s (>8.0) MV lateral e'          0.08 m/s MV medial e'           0.05 m/s E/e' Ratio:            18.60     (<8.0)  MITRAL VALVE:          Normal Ranges: MV DT:        144 msec (150-240msec)  MITRAL INSUFFICIENCY:             Normal Ranges: MR  VTI:               194.96 cm MR Vmax:              651.18 cm/s  AORTIC VALVE:                      Normal Ranges: AoV Vmax:                4.59 m/s  (<=1.7m/s) AoV Peak P.3 mmHg (<20mmHg) AoV Mean P.0 mmHg (1.7-11.5mmHg) LVOT Max Fabien:            0.69 m/s  (<=1.1m/s) AoV VTI:                 102.64 cm (18-25cm) LVOT VTI:                15.75 cm LVOT Diameter:           2.05 cm   (1.8-2.4cm) AoV Area, VTI:           0.51 cm2  (2.5-5.5cm2) AoV Area,Vmax:           0.50 cm2  (2.5-4.5cm2) AoV Dimensionless Index: 0.15  AORTIC INSUFFICIENCY: AI Vmax:       4.09 m/s AI Half-time:  357 msec AI Decel Time: 1230 msec AI Decel Rate: 333.06 cm/s2  RIGHT VENTRICLE: RV Basal 3.70 cm RV Mid   2.50 cm RV Major 6.9 cm  TRICUSPID VALVE/RVSP:          Normal Ranges: Peak TR Velocity:     3.52 m/s RV Syst Pressure:     53 mmHg  (< 30mmHg) IVC Diam:             1.35 cm  PULMONIC VALVE:          Normal Ranges: PV Max Fabien:     0.9 m/s  (0.6-0.9m/s) PV Max PG:      3.0 mmHg  [id number] Mart [facility] AGRAWAL Electronically signed on 2025 at 1:47:14 PM  ** Final **     CT chest wo IV contrast    Result Date: 2025  Interpreted By:  Mehdi Mann, STUDY: CT CHEST WO IV CONTRAST; 2025 3:51 am   INDICATION: Signs/Symptoms:Pleural effusion, dyspnea.  CHF. Shortness of breath.   COMPARISON: Chest radiograph 01/10/2025   ACCESSION NUMBER(S): [id number]   ORDERING CLINICIAN: DAVID PIMENTEL   TECHNIQUE: The study was performed without intravenous contrast material as requested. A helical acquisition data was obtained with multiplanar reconstructions.   One or more of the following dose reduction techniques were used: Automated exposure control Adjustment of the mA and/or kV according to patient size, and/or use of iterative reconstruction technique.   FINDINGS: Within the limits of this unenhanced study no hilar or mediastinal lymphadenopathy is identified.  Four-chamber cardiomegaly is present. Bilateral pleural  effusions are identified. Left pleural effusion is partially loculated anterolaterally, superomedially and anteromedially. Interstitial infiltrates of the Kerley pattern are identified most no below the apices bilaterally. Patchy atelectasis is present within both lower lobes as well as within the right middle lobe and lingula.   Aortic valve calcifications present which could indicate underlying aortic valvular stenosis. Ascending thoracic aorta demonstrates a maximal diameter of 4.1 cm, slight aneurysmal dilatation. Descending thoracic aortic diameter is 2.8 cm.   Chest Wall: No chest wall masses are identified.   Skeletal System: No fractures or destructive lesions are identified. Flowing ossification of the anterior longitudinal ligament is present, consistent with diffuse idiopathic skeletal hyperostosis, DISH.   Upper Abdomen: There is a 1.6 cm cyst posterior segment right lobe of the liver near the dome. There is an accessory splenule at the splenic hilum. Upper pole renal cortical calcification is present on the right.       1. Bilateral pleural effusions are present with multiple areas of loculation of the pleural fluid within left hemithorax. 2. Interstitial infiltrate of the Kerley pattern is present suggesting interstitial edema along with cardiomegaly, suggestive of CHF. 3. Aneurysmal dilatation of the ascending thoracic aorta along with the aortic valve plane calcification which could indicate aortic valvular stenosis. 4. Bibasilar atelectasis. 5. DISH. 6. Cyst dome of the right lobe of the liver.   MACRO: none   Signed by: Mehdi Mann 1/11/2025 8:26 AM Dictation workstation:   [id number]    XR chest 1 view    Result Date: 1/10/2025  STUDY: Chest Radiograph;  Shortness of breath INDICATION: Shortness of breath. COMPARISON: 11/21/2024 XR Chest ACCESSION NUMBER(S): [id number] ORDERING CLINICIAN: LISSET CEBALLOS TECHNIQUE:  Frontal chest was obtained at 19:49 hours. FINDINGS: CARDIOMEDIASTINAL  "SILHOUETTE: The heart remains mildly enlarged but no definite or obvious vascular congestion is appreciated..  LUNGS: There is a moderately large left and a small right pleural effusion with a possible small infrahilar infiltrate on the right.  There is no pneumothorax..  ABDOMEN: No remarkable upper abdominal findings.  BONES: No acute osseous changes.    There is cardiomegaly with a moderately large left and a small right pleural effusion as well as a possible small infrahilar alveolar infiltrate on the right.. Signed by Reid Valiente MD      No results found for this or any previous visit (from the past 24 hours).   Scheduled medications  ammonium lactate, , Topical, BID  aspirin, 81 mg, oral, Daily  enoxaparin, 30 mg, subcutaneous, Daily  furosemide, 20 mg, oral, Daily  metoprolol succinate XL, 50 mg, oral, Daily  nortriptyline, 10 mg, oral, Nightly  oxygen, , inhalation, Continuous - Inhalation  pantoprazole, 40 mg, oral, Daily before breakfast   Or  pantoprazole, 40 mg, intravenous, Daily before breakfast  polyethylene glycol, 17 g, oral, Daily  sennosides, 8.6 mg, oral, Nightly  sertraline, 50 mg, oral, Nightly      Continuous medications     PRN medications  PRN medications: acetaminophen **OR** acetaminophen **OR** acetaminophen, ipratropium-albuteroL, melatonin, ondansetron **OR** ondansetron   Assessment/Plan      This is a 89-year-old male with past medical history of HTN, HLD, CKD 3, AS, Kaposi's sarcoma and other comorbidities presented to the ED was found to saturating 82% on 4 L oxygen upon arrival.  Patient does not speak English well, he speaks Greek and [place].  Per EMS his shortness of breath started around 6 PM he had mentioned \" he is going to die\" a couple times today.  He also was anxious at that time.  Patient denies chest pain, abdominal pain, fever or chills.     Patient was seen and evaluated by cardiology. Patient with severe aortic valve stenosis, needs TAVR, patient declined " surgical intervention. Palliative care consultation placed.     Patient was diuresed with IV Lasix. Currently on 2.5 LNC. Normally on RA.         Assessment & Plan  Acute on chronic diastolic heart failure  Chest x-ray cardiomegaly, moderately large left and small right pleural effusion as well as possible small infrahilar alveolar infiltrate on the right  Remains on 2.5 L nasal cannula continue to wean  Lasix 40 mg IV twice daily on hold 2/2 mild JAYDEN  Echocardiogram yesterday showed ejection fraction of 50 to 55%, severe Aortic valve stenosis   Cardiology following, recommending TAVR, patient declined. Palliative care consult placed.     Pneumonia  Discontinued Rocephin and azithromycin as symptoms more likely 2/2 HF  Follow blood cultures- sofar no growth  Acute hypoxic respiratory failure (Multi)  Secondary to acute CHF   Continue supplemental oxygen, wean as tolerable.  Respiratory therapy  Bronchodilators.    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   Followed by HARRIS Pierre, APRN-CNP

## 2025-01-15 LAB
BACTERIA BLD CULT: NORMAL
BACTERIA BLD CULT: NORMAL

## 2025-01-15 PROCEDURE — 1210000001 HC SEMI-PRIVATE ROOM DAILY

## 2025-01-15 PROCEDURE — 2500000002 HC RX 250 W HCPCS SELF ADMINISTERED DRUGS (ALT 637 FOR MEDICARE OP, ALT 636 FOR OP/ED): Performed by: STUDENT IN AN ORGANIZED HEALTH CARE EDUCATION/TRAINING PROGRAM

## 2025-01-15 PROCEDURE — 99232 SBSQ HOSP IP/OBS MODERATE 35: CPT | Performed by: NURSE PRACTITIONER

## 2025-01-15 PROCEDURE — 2500000005 HC RX 250 GENERAL PHARMACY W/O HCPCS: Performed by: STUDENT IN AN ORGANIZED HEALTH CARE EDUCATION/TRAINING PROGRAM

## 2025-01-15 PROCEDURE — 2500000001 HC RX 250 WO HCPCS SELF ADMINISTERED DRUGS (ALT 637 FOR MEDICARE OP): Performed by: NURSE PRACTITIONER

## 2025-01-15 PROCEDURE — 2500000005 HC RX 250 GENERAL PHARMACY W/O HCPCS

## 2025-01-15 PROCEDURE — 2500000001 HC RX 250 WO HCPCS SELF ADMINISTERED DRUGS (ALT 637 FOR MEDICARE OP): Performed by: STUDENT IN AN ORGANIZED HEALTH CARE EDUCATION/TRAINING PROGRAM

## 2025-01-15 PROCEDURE — 2500000004 HC RX 250 GENERAL PHARMACY W/ HCPCS (ALT 636 FOR OP/ED): Performed by: STUDENT IN AN ORGANIZED HEALTH CARE EDUCATION/TRAINING PROGRAM

## 2025-01-15 RX ADMIN — SERTRALINE 50 MG: 50 TABLET, FILM COATED ORAL at 20:36

## 2025-01-15 RX ADMIN — NORTRIPTYLINE HYDROCHLORIDE 10 MG: 10 CAPSULE ORAL at 20:36

## 2025-01-15 RX ADMIN — ENOXAPARIN SODIUM 30 MG: 30 INJECTION SUBCUTANEOUS at 09:11

## 2025-01-15 RX ADMIN — Medication: at 20:36

## 2025-01-15 RX ADMIN — FUROSEMIDE 20 MG: 20 TABLET ORAL at 09:10

## 2025-01-15 RX ADMIN — PANTOPRAZOLE SODIUM 40 MG: 40 TABLET, DELAYED RELEASE ORAL at 05:23

## 2025-01-15 RX ADMIN — Medication 2 L/MIN: at 20:35

## 2025-01-15 RX ADMIN — Medication 3 MG: at 20:36

## 2025-01-15 RX ADMIN — Medication 3 L/MIN: at 08:48

## 2025-01-15 RX ADMIN — ASPIRIN 81 MG: 81 TABLET, COATED ORAL at 09:10

## 2025-01-15 RX ADMIN — Medication: at 09:10

## 2025-01-15 ASSESSMENT — PAIN SCALES - GENERAL
PAINLEVEL_OUTOF10: 0 - NO PAIN
PAINLEVEL_OUTOF10: 0 - NO PAIN

## 2025-01-15 ASSESSMENT — PAIN SCALES - WONG BAKER: WONGBAKER_NUMERICALRESPONSE: NO HURT

## 2025-01-15 NOTE — ASSESSMENT & PLAN NOTE
Hold lisinopril  Continue home amlodipine  DVT prophylaxis  Lovenox  Disposition  Assisted Living, Hospice-discharge on Friday when bed and personal is available   Care coordination following

## 2025-01-15 NOTE — PROGRESS NOTES
01/15/25 1322   Discharge Planning   Expected Discharge Disposition Home     Patient will go to [facility] with hospice services through [facility] spoke to marisa pts son in law, they are hiring a caregiver to be with patient and this caregiver will start on Friday. Would like pt to dc Friday for safety.   Ismael JONES updated at this time     UPDATE 1540: Hospice meeting scheduled for tomorrow 1230-1pm

## 2025-01-15 NOTE — PROGRESS NOTES
Reid Cano is a 89 y.o. male on day 4 of admission presenting with Acute on chronic diastolic heart failure.    Subjective   Symptoms (0 - 10, Best to Worst)  [place] Symptom Assessment System  0-10 (Numeric) Pain Score: 0 - No pain  On Room Air, sitting up in chair, denies pain or discomfort.        Objective     Vitals and nursing note reviewed.   Constitutional:       General: He is not in acute distress.     Appearance: He is ill-appearing.   HENT:      Head: Normocephalic and atraumatic.      Nose: Nose normal.      Mouth/Throat:      Mouth: Mucous membranes are moist.      Pharynx: Oropharynx is clear.   Eyes:      General: No scleral icterus.     Extraocular Movements: Extraocular movements intact.      Pupils: Pupils are equal, round, and reactive to light.   Cardiovascular:      Rate and Rhythm: Normal rate and regular rhythm.      Pulses: Normal pulses.      Heart sounds: Murmur heard.   Pulmonary:      Effort: Pulmonary effort is normal. No respiratory distress.      Breath sounds: Normal breath sounds.      Comments: NC3L  Abdominal:      General: Abdomen is flat. Bowel sounds are normal. There is no distension.      Palpations: Abdomen is soft.      Tenderness: There is no abdominal tenderness.   Musculoskeletal:         General: No swelling, tenderness, deformity or signs of injury. Normal range of motion.      Cervical back: Normal range of motion and neck supple.      Right lower leg: Edema present.      Left lower leg: Edema present.   Skin:     General: Skin is warm and dry.      Capillary Refill: Capillary refill takes 2 to 3 seconds.      Coloration: Skin is not jaundiced or pale.   Neurological:      General: No focal deficit present.      Mental Status: He is alert and oriented to person, place, and time.   Psychiatric:         Mood and Affect: Mood normal.        Last Recorded Vitals  Blood pressure 101/63, pulse 75, temperature 36.2 °C (97.2 °F), temperature source Oral, resp. rate 17,  "height 1.676 m (5' 6\"), weight 67.1 kg (147 lb 14.9 oz), SpO2 91%.  Intake/Output last 3 Shifts:  I/O last 3 completed shifts:  In: 1145 (17.1 mL/kg) [P.O.:1145]  Out: 1 (0 mL/kg) [Stool:1]  Weight: 67.1 kg     Relevant Results  No results found for this or any previous visit (from the past 24 hours).       Assessment/Plan   IMP:    Acute respiratory Failure with hypoxia  Severe Aortic Valve Stenosis  DHF, grade II dysfunction  PNA  JAYDEN on CKD3  Palliative Care     DNRCCA/DNI/No ICU  Capable, language barrier.   Daughter Ludmila is stated HPOA, with son in law Kishan as stated alternate.   Has LW and DNR at home.      1/14  Patient continues to improve. I spoke to Kishan today. He confirmed that he has a meeting with [facility] today at 3PM, they do have a suite available. We reviewed hospice choices, and family prefers hospice [facility] for additional support at facility. Order placed for hospice consult, [facility]     Plan to medically stablize, then discharge directly to [facility] with hospice for supportive care.     1/13  We reviewed cardiology discussion with family and patient earlier today. Family was able to repeat to me entire discussion accurately. They are aware of progressive nature of valve stenosis and we reviewed impact on quality of life and challenges moving forward including risk of repeated hospitalizations, falls, syncope, confusion, JAYDEN, acute respiratory failure and progressive fatigue. Family and patient are focusing on quality of life and we reviewed hospice services. At the same time, we discussed patient safety as he lives alone in his home. Son has already reached out to [facility] to set up contracts and reserve a room. Ideally family would like to transition patient directly from hospital to [facility]. I have asked care coordination to discuss this plan with family. I have also asked care coordination to reach out to [facility] and " confirm which hospice companies they prefer. I will then review the options with family and set up informational meeting.   Regarding code status, patient and family are in agreement that he does not want any life support type measures at this point. I placed a signed copy of DNRCCA/DNI/No ICU in chart.     I spent 25 minutes in the professional and overall care of this patient.      Larisa Paniagua, APRN-CNP

## 2025-01-15 NOTE — ASSESSMENT & PLAN NOTE
Chest x-ray cardiomegaly, moderately large left and small right pleural effusion as well as possible small infrahilar alveolar infiltrate on the right  Remains on 2.5 L nasal cannula continue to wean  Lasix 40 mg IV twice daily on hold 2/2 mild JAYDEN  Echocardiogram yesterday showed ejection fraction of 50 to 55%, severe Aortic valve stenosis   Cardiology following, recommending TAVR, patient declined. Palliative care consult placed. No surgical intervention, DNRCCA/DNI.

## 2025-01-15 NOTE — PROGRESS NOTES
Reid Cano is a 89 y.o. male on day 5 of admission presenting with Acute on chronic diastolic heart failure.      Subjective   Patient seen and examined. Patient was feeling well today, denies any new c/o.     Objective     Last Recorded Vitals  BP 96/60 (BP Location: Right arm, Patient Position: Lying)   Pulse 66   Temp 37.1 °C (98.8 °F) (Oral)   Resp 16   Wt 67.1 kg (147 lb 14.9 oz)   SpO2 100%   Intake/Output last 3 Shifts:    Intake/Output Summary (Last 24 hours) at 1/15/2025 1557  Last data filed at 1/15/2025 1253  Gross per 24 hour   Intake 870 ml   Output --   Net 870 ml       Admission Weight  Weight: 67.1 kg (147 lb 14.9 oz) (01/10/25 1937)    Daily Weight  01/10/25 : 67.1 kg (147 lb 14.9 oz)          Physical Exam  Vitals reviewed.   Constitutional:       Appearance: He is ill-appearing.   HENT:      Head: Normocephalic.      Nose: Nose normal.      Mouth/Throat:      Mouth: Mucous membranes are moist.   Eyes:      Extraocular Movements: Extraocular movements intact.   Cardiovascular:      Rate and Rhythm: Regular rhythm.   Pulmonary:      Comments: CTA  Abdominal:      General: Bowel sounds are normal.   Musculoskeletal:         General: Normal range of motion.      Cervical back: Neck supple.   Skin:     General: Skin is warm.   Neurological:      Mental Status: He is alert and oriented to person, place, and time.         Relevant Results             ECG 12 lead    Result Date: 1/13/2025  Normal sinus rhythm Nonspecific ST and T wave abnormality Abnormal ECG When compared with ECG of 10-YOLY-2025 19:24, (unconfirmed) Left bundle branch block is no longer Present Confirmed by Mart Cody ([id number]) on 1/13/2025 11:44:53 AM    ECG 12 lead    Result Date: 1/13/2025   Poor data quality, interpretation may be adversely affected Normal sinus rhythm Left bundle branch block Abnormal ECG When compared with ECG of 21-NOV-2024 12:05, Left bundle branch block is now Present Minimal criteria for Anterior infarct  are no longer Present Confirmed by Mart Cody ([id number]) on 1/13/2025 11:36:45 AM    US renal complete    Result Date: 1/12/2025  Interpreted By:  Ronda Tiwari, STUDY: US RENAL COMPLETE; 1/11/2025 5:08 pm   INDICATION: Chronic kidney disease   COMPARISON: None.   ACCESSION NUMBER(S): [id number]   ORDERING CLINICIAN: SANJAY PALACIO   TECHNIQUE: Grayscale and color Doppler imaging of the kidneys.   FINDINGS: The right kidney measures 8.3 cm in length. There is a 1.0 x 1.1 x 1.1 cm exophytic right renal cyst. The left kidney measures 8.6 cm in length. There is no shadowing calculus, hydronephrosis, or solid mass identified. The renal cortical thickness is normal with slight increase in renal cortical echogenicity appreciated.   Incidental note is made of cholelithiasis.   Urinary bladder is not adequately distended for evaluation.       Mild increase in renal cortical echogenicity which may indicate medical renal disease. The kidneys are symmetrically small in size without renal cortical thinning or hydronephrosis.   1 cm exophytic right renal cyst.   Cholelithiasis.   MACRO: None.   Signed by: Ronda Tiwari 1/12/2025 5:25 AM Dictation workstation:   [id number]    Transthoracic Echo (TTE) Complete    Result Date: 1/11/2025           [place] [address]            Phone [phone] TRANSTHORACIC ECHOCARDIOGRAM REPORT Patient Name:       JEANNINE GARDNER         Reading Physician:    [id number] Mart Cody DO Study Date:         1/11/2025            Ordering Provider:    [id number] PIMENTEL MRN/PID:            [id number]             Fellow: Accession#:         [id number]         Nurse: Date of Birth/Age:  2/12/1929 / 89 years Sonographer:          Ignacio Nunez RDCS Gender Assigned at  M                     Additional Staff: Birth: Height:             166.00 cm            Admit Date: Weight:             68.00 kg             Admission Status:     Inpatient -                                                                Routine BSA / BMI:          1.76 m2 / 24.68      Department Location:  [facility]                     kg/m2 Blood Pressure: 145 /66 mmHg Study Type:    TRANSTHORACIC ECHO (TTE) COMPLETE Diagnosis/ICD: Cardiac murmur, unspecified-R01.1 Indication:    Murmur CPT Codes:     Echo Complete w Full [id number] Patient History: Pertinent History: Murmur, HTN, Hyperlipidemia and Chest Pain. PVD, Renal dx                    III. Study Detail: The following Echo studies were performed: 2D, M-Mode, Doppler and               color flow. Technically challenging study due to poor acoustic               windows and patient lying in supine position.  PHYSICIAN INTERPRETATION: Left Ventricle: The left ventricular systolic function is low normal, with a visually estimated ejection fraction of 50-55%. There is mild to moderate concentric left ventricular hypertrophy. There are no regional wall motion abnormalities. The left ventricular cavity size is normal. Spectral Doppler shows a Grade II (pseudonormal pattern) of left ventricular diastolic filling with an elevated left atrial pressure. Left Atrium: The left atrium is mildly dilated. Right Ventricle: The right ventricle is normal in size. There is normal right ventricular global systolic function. Right Atrium: The right atrium is normal in size. Aortic Valve: The aortic valve is probably trileaflet. There is evidence of severe aortic valve stenosis. The aortic valve dimensionless index is 0.15. There is moderate aortic valve regurgitation. The peak instantaneous gradient of the aortic valve is 84 mmHg. The mean gradient of the aortic valve is 51 mmHg. Mitral Valve: The mitral valve is moderately thickened. There is mild to moderate mitral annular calcification.  There is mild mitral valve regurgitation. Tricuspid Valve: The tricuspid valve is structurally normal. There is mild tricuspid regurgitation. Pulmonic Valve: The pulmonic valve is not well visualized. The pulmonic valve regurgitation was not well visualized. Pericardium: No pericardial effusion noted. Aorta: The aortic root is normal.  CONCLUSIONS:  1. The left ventricular systolic function is low normal, with a visually estimated ejection fraction of 50-55%.  2. No regional wall motion abnormalities.  3. Spectral Doppler shows a Grade II (pseudonormal pattern) of left ventricular diastolic filling with an elevated left atrial pressure.  4. There is normal right ventricular global systolic function.  5. The mitral valve is moderately thickened.  6. Severe aortic valve stenosis.  7. Moderate aortic valve regurgitation.  8. Aortic stenosis mean gradient 51 mm Hg, peak gradient 84 mm Hg and dimensionless index 0.15. QUANTITATIVE DATA SUMMARY:  2D MEASUREMENTS:         Normal Ranges: Ao Root s:       3.35 cm  LA VOLUME:                   Normal Ranges: LA Vol A4C:       76.5 ml LA Vol A2C:       124.1 ml LA Vol Index BSA: 57.1 ml/m2  RA VOLUME BY A/L METHOD:          Normal Ranges: RA Area A4C:             22.0 cm2  LV SYSTOLIC FUNCTION BY 2D PLANIMETRY (MOD):                      Normal Ranges: EF-A4C View:    59 % (>=55%) EF-A2C View:    56 % EF-Biplane:     62 % EF-Visual:      53 % EF-Auto:        56 % LV EF Reported: 53 %  LV DIASTOLIC FUNCTION:           Normal Ranges: MV Peak E:             1.19 m/s  (0.7-1.2 m/s) MV Peak A:             0.78 m/s  (0.42-0.7 m/s) E/A Ratio:             1.53      (1.0-2.2) MV e'                  0.064 m/s (>8.0) MV lateral e'          0.08 m/s MV medial e'           0.05 m/s E/e' Ratio:            18.60     (<8.0)  MITRAL VALVE:          Normal Ranges: MV DT:        144 msec (150-240msec)  MITRAL INSUFFICIENCY:             Normal Ranges: MR VTI:               194.96 cm MR Vmax:               651.18 cm/s  AORTIC VALVE:                      Normal Ranges: AoV Vmax:                4.59 m/s  (<=1.7m/s) AoV Peak P.3 mmHg (<20mmHg) AoV Mean P.0 mmHg (1.7-11.5mmHg) LVOT Max Fabien:            0.69 m/s  (<=1.1m/s) AoV VTI:                 102.64 cm (18-25cm) LVOT VTI:                15.75 cm LVOT Diameter:           2.05 cm   (1.8-2.4cm) AoV Area, VTI:           0.51 cm2  (2.5-5.5cm2) AoV Area,Vmax:           0.50 cm2  (2.5-4.5cm2) AoV Dimensionless Index: 0.15  AORTIC INSUFFICIENCY: AI Vmax:       4.09 m/s AI Half-time:  357 msec AI Decel Time: 1230 msec AI Decel Rate: 333.06 cm/s2  RIGHT VENTRICLE: RV Basal 3.70 cm RV Mid   2.50 cm RV Major 6.9 cm  TRICUSPID VALVE/RVSP:          Normal Ranges: Peak TR Velocity:     3.52 m/s RV Syst Pressure:     53 mmHg  (< 30mmHg) IVC Diam:             1.35 cm  PULMONIC VALVE:          Normal Ranges: PV Max Fabien:     0.9 m/s  (0.6-0.9m/s) PV Max PG:      3.0 mmHg  [id number] Mart [facility] AG[facility] Electronically signed on 2025 at 1:47:14 PM  ** Final **     CT chest wo IV contrast    Result Date: 2025  Interpreted By:  Mehdi Mann, STUDY: CT CHEST WO IV CONTRAST; 2025 3:51 am   INDICATION: Signs/Symptoms:Pleural effusion, dyspnea.  CHF. Shortness of breath.   COMPARISON: Chest radiograph 01/10/2025   ACCESSION NUMBER(S): [id number]   ORDERING CLINICIAN: DAVID PIMENTEL   TECHNIQUE: The study was performed without intravenous contrast material as requested. A helical acquisition data was obtained with multiplanar reconstructions.   One or more of the following dose reduction techniques were used: Automated exposure control Adjustment of the mA and/or kV according to patient size, and/or use of iterative reconstruction technique.   FINDINGS: Within the limits of this unenhanced study no hilar or mediastinal lymphadenopathy is identified.  Four-chamber cardiomegaly is present. Bilateral pleural effusions are identified. Left pleural  effusion is partially loculated anterolaterally, superomedially and anteromedially. Interstitial infiltrates of the Kerley pattern are identified most no below the apices bilaterally. Patchy atelectasis is present within both lower lobes as well as within the right middle lobe and lingula.   Aortic valve calcifications present which could indicate underlying aortic valvular stenosis. Ascending thoracic aorta demonstrates a maximal diameter of 4.1 cm, slight aneurysmal dilatation. Descending thoracic aortic diameter is 2.8 cm.   Chest Wall: No chest wall masses are identified.   Skeletal System: No fractures or destructive lesions are identified. Flowing ossification of the anterior longitudinal ligament is present, consistent with diffuse idiopathic skeletal hyperostosis, DISH.   Upper Abdomen: There is a 1.6 cm cyst posterior segment right lobe of the liver near the dome. There is an accessory splenule at the splenic hilum. Upper pole renal cortical calcification is present on the right.       1. Bilateral pleural effusions are present with multiple areas of loculation of the pleural fluid within left hemithorax. 2. Interstitial infiltrate of the Kerley pattern is present suggesting interstitial edema along with cardiomegaly, suggestive of CHF. 3. Aneurysmal dilatation of the ascending thoracic aorta along with the aortic valve plane calcification which could indicate aortic valvular stenosis. 4. Bibasilar atelectasis. 5. DISH. 6. Cyst dome of the right lobe of the liver.   MACRO: none   Signed by: Mehdi Mann 1/11/2025 8:26 AM Dictation workstation:   [id number]    XR chest 1 view    Result Date: 1/10/2025  STUDY: Chest Radiograph;  Shortness of breath INDICATION: Shortness of breath. COMPARISON: 11/21/2024 XR Chest ACCESSION NUMBER(S): [id number] ORDERING CLINICIAN: LISSET CEBALLOS TECHNIQUE:  Frontal chest was obtained at 19:49 hours. FINDINGS: CARDIOMEDIASTINAL SILHOUETTE: The heart remains mildly enlarged  "but no definite or obvious vascular congestion is appreciated..  LUNGS: There is a moderately large left and a small right pleural effusion with a possible small infrahilar infiltrate on the right.  There is no pneumothorax..  ABDOMEN: No remarkable upper abdominal findings.  BONES: No acute osseous changes.    There is cardiomegaly with a moderately large left and a small right pleural effusion as well as a possible small infrahilar alveolar infiltrate on the right.. Signed by Reid Valiente MD      No results found for this or any previous visit (from the past 24 hours).   Scheduled medications  ammonium lactate, , Topical, BID  aspirin, 81 mg, oral, Daily  enoxaparin, 30 mg, subcutaneous, Daily  furosemide, 20 mg, oral, Daily  metoprolol succinate XL, 50 mg, oral, Daily  nortriptyline, 10 mg, oral, Nightly  oxygen, , inhalation, Continuous - Inhalation  pantoprazole, 40 mg, oral, Daily before breakfast   Or  pantoprazole, 40 mg, intravenous, Daily before breakfast  polyethylene glycol, 17 g, oral, Daily  sennosides, 8.6 mg, oral, Nightly  sertraline, 50 mg, oral, Nightly      Continuous medications     PRN medications  PRN medications: acetaminophen **OR** acetaminophen **OR** acetaminophen, ipratropium-albuteroL, melatonin, ondansetron **OR** ondansetron   Assessment/Plan      This is a 89-year-old male with past medical history of HTN, HLD, CKD 3, AS, Kaposi's sarcoma and other comorbidities presented to the ED was found to saturating 82% on 4 L oxygen upon arrival.  Patient does not speak English well, he speaks Indonesian and Indonesian.  Per EMS his shortness of breath started around 6 PM he had mentioned \" he is going to die\" a couple times today.  He also was anxious at that time.  Patient denies chest pain, abdominal pain, fever or chills.     Patient was seen and evaluated by cardiology. Patient with severe aortic valve stenosis, needs TAVR, patient declined surgical intervention. Palliative care consultation " placed.     Patient was diuresed with IV Lasix. Currently on 2.5 LNC. Normally on RA.         Assessment & Plan  Acute on chronic diastolic heart failure  Chest x-ray cardiomegaly, moderately large left and small right pleural effusion as well as possible small infrahilar alveolar infiltrate on the right  Remains on 2.5 L nasal cannula continue to wean  Lasix 40 mg IV twice daily on hold 2/2 mild JAYDEN  Echocardiogram yesterday showed ejection fraction of 50 to 55%, severe Aortic valve stenosis   Cardiology following, recommending TAVR, patient declined. Palliative care consult placed. No surgical intervention, DNRCCA/DNI.    Pneumonia  Discontinued Rocephin and azithromycin as symptoms more likely 2/2 HF  Follow blood cultures- sofar no growth  Acute hypoxic respiratory failure (Multi)  Secondary to acute CHF   Continue supplemental oxygen, wean as tolerable.  Respiratory therapy  Bronchodilators.    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

## 2025-01-16 PROCEDURE — 2500000004 HC RX 250 GENERAL PHARMACY W/ HCPCS (ALT 636 FOR OP/ED): Performed by: STUDENT IN AN ORGANIZED HEALTH CARE EDUCATION/TRAINING PROGRAM

## 2025-01-16 PROCEDURE — 2500000005 HC RX 250 GENERAL PHARMACY W/O HCPCS

## 2025-01-16 PROCEDURE — 99232 SBSQ HOSP IP/OBS MODERATE 35: CPT | Performed by: NURSE PRACTITIONER

## 2025-01-16 PROCEDURE — 2500000002 HC RX 250 W HCPCS SELF ADMINISTERED DRUGS (ALT 637 FOR MEDICARE OP, ALT 636 FOR OP/ED): Performed by: STUDENT IN AN ORGANIZED HEALTH CARE EDUCATION/TRAINING PROGRAM

## 2025-01-16 PROCEDURE — 2500000001 HC RX 250 WO HCPCS SELF ADMINISTERED DRUGS (ALT 637 FOR MEDICARE OP): Performed by: PHYSICIAN ASSISTANT

## 2025-01-16 PROCEDURE — 2500000001 HC RX 250 WO HCPCS SELF ADMINISTERED DRUGS (ALT 637 FOR MEDICARE OP): Performed by: STUDENT IN AN ORGANIZED HEALTH CARE EDUCATION/TRAINING PROGRAM

## 2025-01-16 PROCEDURE — 2500000001 HC RX 250 WO HCPCS SELF ADMINISTERED DRUGS (ALT 637 FOR MEDICARE OP): Performed by: NURSE PRACTITIONER

## 2025-01-16 PROCEDURE — 1210000001 HC SEMI-PRIVATE ROOM DAILY

## 2025-01-16 RX ORDER — AMMONIUM LACTATE 12 G/100G
LOTION TOPICAL 2 TIMES DAILY
Start: 2025-01-16 | End: 2025-01-17

## 2025-01-16 RX ORDER — FUROSEMIDE 20 MG/1
20 TABLET ORAL DAILY
Start: 2025-01-17 | End: 2025-01-17

## 2025-01-16 RX ORDER — ACETAMINOPHEN 325 MG/1
650 TABLET ORAL EVERY 4 HOURS PRN
Start: 2025-01-16

## 2025-01-16 RX ORDER — PANTOPRAZOLE SODIUM 40 MG/1
40 TABLET, DELAYED RELEASE ORAL
Start: 2025-01-17 | End: 2025-01-17

## 2025-01-16 RX ADMIN — Medication: at 20:50

## 2025-01-16 RX ADMIN — Medication 2 L/MIN: at 09:17

## 2025-01-16 RX ADMIN — STANDARDIZED SENNA CONCENTRATE 8.6 MG: 8.6 TABLET ORAL at 20:49

## 2025-01-16 RX ADMIN — PANTOPRAZOLE SODIUM 40 MG: 40 TABLET, DELAYED RELEASE ORAL at 09:24

## 2025-01-16 RX ADMIN — FUROSEMIDE 20 MG: 20 TABLET ORAL at 09:24

## 2025-01-16 RX ADMIN — ASPIRIN 81 MG: 81 TABLET, COATED ORAL at 09:24

## 2025-01-16 RX ADMIN — METOPROLOL SUCCINATE 50 MG: 50 TABLET, EXTENDED RELEASE ORAL at 09:24

## 2025-01-16 RX ADMIN — SERTRALINE 50 MG: 50 TABLET, FILM COATED ORAL at 20:49

## 2025-01-16 RX ADMIN — Medication: at 09:25

## 2025-01-16 RX ADMIN — ENOXAPARIN SODIUM 30 MG: 30 INJECTION SUBCUTANEOUS at 09:24

## 2025-01-16 RX ADMIN — NORTRIPTYLINE HYDROCHLORIDE 10 MG: 10 CAPSULE ORAL at 20:49

## 2025-01-16 ASSESSMENT — COGNITIVE AND FUNCTIONAL STATUS - GENERAL
MOBILITY SCORE: 23
MOBILITY SCORE: 23
DAILY ACTIVITIY SCORE: 24
DAILY ACTIVITIY SCORE: 24
CLIMB 3 TO 5 STEPS WITH RAILING: A LITTLE
CLIMB 3 TO 5 STEPS WITH RAILING: A LITTLE

## 2025-01-16 ASSESSMENT — PAIN SCALES - GENERAL: PAINLEVEL_OUTOF10: 0 - NO PAIN

## 2025-01-16 ASSESSMENT — PAIN SCALES - WONG BAKER: WONGBAKER_NUMERICALRESPONSE: NO HURT

## 2025-01-16 NOTE — PROGRESS NOTES
Spiritual Care Visit  Spiritual Care Request    Reason for Visit:  Routine Visit: Introduction     Request Received From:       Focus of Care:  Visited With: Patient         Refer to :          Spiritual Care Assessment    Spiritual Assessment:                      Care Provided:  Intended Effects: Establish rapport and connectedness, Build relationship of care and support, Demonstrate caring and concern, Lessen someone's feelings of isolation  Methods: Offer emotional support  Interventions: Explain  role    Sense of Community and or Mu-ism Affiliation:  Other         Addressed Needs/Concerns and/or Oscar Through:          Outcome:        Advance Directives:         Spiritual Care Annotation      Annotation: received request for patient and family support.  greeted the patient and offered emotional support explain the role of  for patient and family support during their time admitted in [facility]. Patient was laying flat in the but and appears comfortable. Patient appears to be limited in his communication.  offered emotional support. Patient did not indicate any needs. No family was present today.   Spiritual care will continue to provided support as requested.

## 2025-01-16 NOTE — PROGRESS NOTES
Reid Cano is a 89 y.o. male on day 6 of admission presenting with Acute on chronic diastolic heart failure.      Subjective   Patient seen and examined. Patient was feeling well today, denies any new c/o. Patient is on RA.     Objective     Last Recorded Vitals  /57 (BP Location: Right arm, Patient Position: Lying)   Pulse 73   Temp 36.2 °C (97.2 °F) (Oral)   Resp 16   Wt 67.1 kg (147 lb 14.9 oz)   SpO2 100%   Intake/Output last 3 Shifts:    Intake/Output Summary (Last 24 hours) at 1/16/2025 1048  Last data filed at 1/16/2025 0859  Gross per 24 hour   Intake 800 ml   Output 1 ml   Net 799 ml       Admission Weight  Weight: 67.1 kg (147 lb 14.9 oz) (01/10/25 1937)    Daily Weight  01/10/25 : 67.1 kg (147 lb 14.9 oz)          Physical Exam  Vitals reviewed.   Constitutional:       Appearance: He is ill-appearing.   HENT:      Head: Normocephalic.      Nose: Nose normal.      Mouth/Throat:      Mouth: Mucous membranes are moist.   Eyes:      Extraocular Movements: Extraocular movements intact.   Cardiovascular:      Rate and Rhythm: Regular rhythm.   Pulmonary:      Comments: CTA  Abdominal:      General: Bowel sounds are normal.   Musculoskeletal:         General: Normal range of motion.      Cervical back: Neck supple.   Skin:     General: Skin is warm.   Neurological:      Mental Status: He is alert and oriented to person, place, and time.         Relevant Results             ECG 12 lead    Result Date: 1/13/2025  Normal sinus rhythm Nonspecific ST and T wave abnormality Abnormal ECG When compared with ECG of 10-YOLY-2025 19:24, (unconfirmed) Left bundle branch block is no longer Present Confirmed by Mart Cody ([id number]) on 1/13/2025 11:44:53 AM    ECG 12 lead    Result Date: 1/13/2025   Poor data quality, interpretation may be adversely affected Normal sinus rhythm Left bundle branch block Abnormal ECG When compared with ECG of 21-NOV-2024 12:05, Left bundle branch block is now Present Minimal criteria  for Anterior infarct are no longer Present Confirmed by Mart Cody ([id number]) on 1/13/2025 11:36:45 AM    US renal complete    Result Date: 1/12/2025  Interpreted By:  Ronda Tiwari, STUDY: US RENAL COMPLETE; 1/11/2025 5:08 pm   INDICATION: Chronic kidney disease   COMPARISON: None.   ACCESSION NUMBER(S): [id number]   ORDERING CLINICIAN: SANJAY PALACIO   TECHNIQUE: Grayscale and color Doppler imaging of the kidneys.   FINDINGS: The right kidney measures 8.3 cm in length. There is a 1.0 x 1.1 x 1.1 cm exophytic right renal cyst. The left kidney measures 8.6 cm in length. There is no shadowing calculus, hydronephrosis, or solid mass identified. The renal cortical thickness is normal with slight increase in renal cortical echogenicity appreciated.   Incidental note is made of cholelithiasis.   Urinary bladder is not adequately distended for evaluation.       Mild increase in renal cortical echogenicity which may indicate medical renal disease. The kidneys are symmetrically small in size without renal cortical thinning or hydronephrosis.   1 cm exophytic right renal cyst.   Cholelithiasis.   MACRO: None.   Signed by: Ronda Tiwari 1/12/2025 5:25 AM Dictation workstation:   [id number]    Transthoracic Echo (TTE) Complete    Result Date: 1/11/2025           [place] [address]            Phone [phone] TRANSTHORACIC ECHOCARDIOGRAM REPORT Patient Name:       JEANNINE GARDNER         Reading Physician:    [id number] Mart Cody DO Study Date:         1/11/2025            Ordering Provider:    [id number] PIMENTEL MRN/PID:            [id number]             Fellow: Accession#:         [id number]         Nurse: Date of Birth/Age:  2/12/1929 / 89 years Sonographer:          Ignacio Nunez RDCS Gender  Assigned at  M                    Additional Staff: Birth: Height:             166.00 cm            Admit Date: Weight:             68.00 kg             Admission Status:     Inpatient -                                                                Routine BSA / BMI:          1.76 m2 / 24.68      Department Location:  [facility], [place], operated by [facility]                     kg/m2 Blood Pressure: 145 /66 mmHg Study Type:    TRANSTHORACIC ECHO (TTE) COMPLETE Diagnosis/ICD: Cardiac murmur, unspecified-R01.1 Indication:    Murmur CPT Codes:     Echo Complete w Full [id number] Patient History: Pertinent History: Murmur, HTN, Hyperlipidemia and Chest Pain. PVD, Renal dx                    III. Study Detail: The following Echo studies were performed: 2D, M-Mode, Doppler and               color flow. Technically challenging study due to poor acoustic               windows and patient lying in supine position.  PHYSICIAN INTERPRETATION: Left Ventricle: The left ventricular systolic function is low normal, with a visually estimated ejection fraction of 50-55%. There is mild to moderate concentric left ventricular hypertrophy. There are no regional wall motion abnormalities. The left ventricular cavity size is normal. Spectral Doppler shows a Grade II (pseudonormal pattern) of left ventricular diastolic filling with an elevated left atrial pressure. Left Atrium: The left atrium is mildly dilated. Right Ventricle: The right ventricle is normal in size. There is normal right ventricular global systolic function. Right Atrium: The right atrium is normal in size. Aortic Valve: The aortic valve is probably trileaflet. There is evidence of severe aortic valve stenosis. The aortic valve dimensionless index is 0.15. There is moderate aortic valve regurgitation. The peak instantaneous gradient of the aortic valve is 84 mmHg. The mean gradient of the aortic valve is 51 mmHg. Mitral Valve: The mitral valve is moderately thickened. There is mild to moderate  mitral annular calcification. There is mild mitral valve regurgitation. Tricuspid Valve: The tricuspid valve is structurally normal. There is mild tricuspid regurgitation. Pulmonic Valve: The pulmonic valve is not well visualized. The pulmonic valve regurgitation was not well visualized. Pericardium: No pericardial effusion noted. Aorta: The aortic root is normal.  CONCLUSIONS:  1. The left ventricular systolic function is low normal, with a visually estimated ejection fraction of 50-55%.  2. No regional wall motion abnormalities.  3. Spectral Doppler shows a Grade II (pseudonormal pattern) of left ventricular diastolic filling with an elevated left atrial pressure.  4. There is normal right ventricular global systolic function.  5. The mitral valve is moderately thickened.  6. Severe aortic valve stenosis.  7. Moderate aortic valve regurgitation.  8. Aortic stenosis mean gradient 51 mm Hg, peak gradient 84 mm Hg and dimensionless index 0.15. QUANTITATIVE DATA SUMMARY:  2D MEASUREMENTS:         Normal Ranges: Ao Root s:       3.35 cm  LA VOLUME:                   Normal Ranges: LA Vol A4C:       76.5 ml LA Vol A2C:       124.1 ml LA Vol Index BSA: 57.1 ml/m2  RA VOLUME BY A/L METHOD:          Normal Ranges: RA Area A4C:             22.0 cm2  LV SYSTOLIC FUNCTION BY 2D PLANIMETRY (MOD):                      Normal Ranges: EF-A4C View:    59 % (>=55%) EF-A2C View:    56 % EF-Biplane:     62 % EF-Visual:      53 % EF-Auto:        56 % LV EF Reported: 53 %  LV DIASTOLIC FUNCTION:           Normal Ranges: MV Peak E:             1.19 m/s  (0.7-1.2 m/s) MV Peak A:             0.78 m/s  (0.42-0.7 m/s) E/A Ratio:             1.53      (1.0-2.2) MV e'                  0.064 m/s (>8.0) MV lateral e'          0.08 m/s MV medial e'           0.05 m/s E/e' Ratio:            18.60     (<8.0)  MITRAL VALVE:          Normal Ranges: MV DT:        144 msec (150-240msec)  MITRAL INSUFFICIENCY:             Normal Ranges: MR VTI:                194.96 cm MR Vmax:              651.18 cm/s  AORTIC VALVE:                      Normal Ranges: AoV Vmax:                4.59 m/s  (<=1.7m/s) AoV Peak P.3 mmHg (<20mmHg) AoV Mean P.0 mmHg (1.7-11.5mmHg) LVOT Max Fabien:            0.69 m/s  (<=1.1m/s) AoV VTI:                 102.64 cm (18-25cm) LVOT VTI:                15.75 cm LVOT Diameter:           2.05 cm   (1.8-2.4cm) AoV Area, VTI:           0.51 cm2  (2.5-5.5cm2) AoV Area,Vmax:           0.50 cm2  (2.5-4.5cm2) AoV Dimensionless Index: 0.15  AORTIC INSUFFICIENCY: AI Vmax:       4.09 m/s AI Half-time:  357 msec AI Decel Time: 1230 msec AI Decel Rate: 333.06 cm/s2  RIGHT VENTRICLE: RV Basal 3.70 cm RV Mid   2.50 cm RV Major 6.9 cm  TRICUSPID VALVE/RVSP:          Normal Ranges: Peak TR Velocity:     3.52 m/s RV Syst Pressure:     53 mmHg  (< 30mmHg) IVC Diam:             1.35 cm  PULMONIC VALVE:          Normal Ranges: PV Max Fabien:     0.9 m/s  (0.6-0.9m/s) PV Max PG:      3.0 mmHg  [id number] Mart Dameron [facility] AG[facility]AL Electronically signed on 2025 at 1:47:14 PM  ** Final **     CT chest wo IV contrast    Result Date: 2025  Interpreted By:  Mehdi Mann, STUDY: CT CHEST WO IV CONTRAST; 2025 3:51 am   INDICATION: Signs/Symptoms:Pleural effusion, dyspnea.  CHF. Shortness of breath.   COMPARISON: Chest radiograph 01/10/2025   ACCESSION NUMBER(S): [id number]   ORDERING CLINICIAN: DAVID PIMENTEL   TECHNIQUE: The study was performed without intravenous contrast material as requested. A helical acquisition data was obtained with multiplanar reconstructions.   One or more of the following dose reduction techniques were used: Automated exposure control Adjustment of the mA and/or kV according to patient size, and/or use of iterative reconstruction technique.   FINDINGS: Within the limits of this unenhanced study no hilar or mediastinal lymphadenopathy is identified.  Four-chamber cardiomegaly is present. Bilateral pleural effusions are  identified. Left pleural effusion is partially loculated anterolaterally, superomedially and anteromedially. Interstitial infiltrates of the Kerley pattern are identified most no below the apices bilaterally. Patchy atelectasis is present within both lower lobes as well as within the right middle lobe and lingula.   Aortic valve calcifications present which could indicate underlying aortic valvular stenosis. Ascending thoracic aorta demonstrates a maximal diameter of 4.1 cm, slight aneurysmal dilatation. Descending thoracic aortic diameter is 2.8 cm.   Chest Wall: No chest wall masses are identified.   Skeletal System: No fractures or destructive lesions are identified. Flowing ossification of the anterior longitudinal ligament is present, consistent with diffuse idiopathic skeletal hyperostosis, DISH.   Upper Abdomen: There is a 1.6 cm cyst posterior segment right lobe of the liver near the dome. There is an accessory splenule at the splenic hilum. Upper pole renal cortical calcification is present on the right.       1. Bilateral pleural effusions are present with multiple areas of loculation of the pleural fluid within left hemithorax. 2. Interstitial infiltrate of the Kerley pattern is present suggesting interstitial edema along with cardiomegaly, suggestive of CHF. 3. Aneurysmal dilatation of the ascending thoracic aorta along with the aortic valve plane calcification which could indicate aortic valvular stenosis. 4. Bibasilar atelectasis. 5. DISH. 6. Cyst dome of the right lobe of the liver.   MACRO: none   Signed by: Mehdi Mann 1/11/2025 8:26 AM Dictation workstation:   [id number]    XR chest 1 view    Result Date: 1/10/2025  STUDY: Chest Radiograph;  Shortness of breath INDICATION: Shortness of breath. COMPARISON: 11/21/2024 XR Chest ACCESSION NUMBER(S): [id number] ORDERING CLINICIAN: LISSET CEBALLOS TECHNIQUE:  Frontal chest was obtained at 19:49 hours. FINDINGS: CARDIOMEDIASTINAL SILHOUETTE: The heart  "remains mildly enlarged but no definite or obvious vascular congestion is appreciated..  LUNGS: There is a moderately large left and a small right pleural effusion with a possible small infrahilar infiltrate on the right.  There is no pneumothorax..  ABDOMEN: No remarkable upper abdominal findings.  BONES: No acute osseous changes.    There is cardiomegaly with a moderately large left and a small right pleural effusion as well as a possible small infrahilar alveolar infiltrate on the right.. Signed by Reid Valiente MD      No results found for this or any previous visit (from the past 24 hours).   Scheduled medications  ammonium lactate, , Topical, BID  aspirin, 81 mg, oral, Daily  enoxaparin, 30 mg, subcutaneous, Daily  furosemide, 20 mg, oral, Daily  metoprolol succinate XL, 50 mg, oral, Daily  nortriptyline, 10 mg, oral, Nightly  oxygen, , inhalation, Continuous - Inhalation  pantoprazole, 40 mg, oral, Daily before breakfast   Or  pantoprazole, 40 mg, intravenous, Daily before breakfast  polyethylene glycol, 17 g, oral, Daily  sennosides, 8.6 mg, oral, Nightly  sertraline, 50 mg, oral, Nightly      Continuous medications     PRN medications  PRN medications: acetaminophen **OR** acetaminophen **OR** acetaminophen, ipratropium-albuteroL, melatonin, ondansetron **OR** ondansetron   Assessment/Plan      This is a 89-year-old male with past medical history of HTN, HLD, CKD 3, AS, Kaposi's sarcoma and other comorbidities presented to the ED was found to saturating 82% on 4 L oxygen upon arrival.  Patient does not speak English well, he speaks Yoruba and Tajik.  Per EMS his shortness of breath started around 6 PM he had mentioned \" he is going to die\" a couple times today.  He also was anxious at that time.  Patient denies chest pain, abdominal pain, fever or chills.     Patient was seen and evaluated by cardiology. Patient with severe aortic valve stenosis, needs TAVR, patient declined surgical intervention. " Palliative care consultation placed.     Patient was diuresed with IV Lasix. Currently on 2.5 LNC. Normally on RA.         Assessment & Plan  Acute on chronic diastolic heart failure  Chest x-ray cardiomegaly, moderately large left and small right pleural effusion as well as possible small infrahilar alveolar infiltrate on the right  On RA this morning  Lasix 40 mg IV twice daily on hold 2/2 mild JAYDEN  Echocardiogram yesterday showed ejection fraction of 50 to 55%, severe Aortic valve stenosis   Cardiology following, recommending TAVR, patient declined. Palliative care consult placed. No surgical intervention, DNRCCA/DNI.    Pneumonia  Discontinued Rocephin and azithromycin as symptoms more likely 2/2 HF  Follow blood cultures- sofar no growth  Acute hypoxic respiratory failure (Multi)  Secondary to acute CHF   Continue supplemental oxygen, wean as tolerable.  Respiratory therapy  Bronchodilators.    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    Case discussed with AUGUSTA.    Samir Pierre, APRN-CNP

## 2025-01-16 NOTE — PROGRESS NOTES
01/16/25 1518   Discharge Planning   Expected Discharge Disposition Home   Does the patient need discharge transport arranged? Yes   Has discharge transport been arranged? Yes   What day is the transport expected? 01/17/25   What time is the transport expected? 1100     TCC spoke to Hospice RN, all equipment will be sent to facility tonight transport set to [facility] for tomorrow at 1100     **Patient has a  safe discharge plan**

## 2025-01-16 NOTE — PROGRESS NOTES
Reid Cano is a 89 y.o. male on day 5 of admission presenting with Acute on chronic diastolic heart failure.    Subjective   Symptoms (0 - 10, Best to Worst)  [place] Symptom Assessment System  0-10 (Numeric) Pain Score: 0 - No pain  On Room Air, sitting up in chair, denies pain or discomfort.        Objective     Vitals and nursing note reviewed.   Constitutional:       General: He is not in acute distress.     Appearance: He is ill-appearing.   HENT:      Head: Normocephalic and atraumatic.      Nose: Nose normal.      Mouth/Throat:      Mouth: Mucous membranes are moist.      Pharynx: Oropharynx is clear.   Eyes:      General: No scleral icterus.     Extraocular Movements: Extraocular movements intact.      Pupils: Pupils are equal, round, and reactive to light.   Cardiovascular:      Rate and Rhythm: Normal rate and regular rhythm.      Pulses: Normal pulses.      Heart sounds: Murmur heard.   Pulmonary:      Effort: Pulmonary effort is normal. No respiratory distress.      Breath sounds: Normal breath sounds.      Comments: NC3L  Abdominal:      General: Abdomen is flat. Bowel sounds are normal. There is no distension.      Palpations: Abdomen is soft.      Tenderness: There is no abdominal tenderness.   Musculoskeletal:         General: No swelling, tenderness, deformity or signs of injury. Normal range of motion.      Cervical back: Normal range of motion and neck supple.      Right lower leg: Edema present.      Left lower leg: Edema present.   Skin:     General: Skin is warm and dry.      Capillary Refill: Capillary refill takes 2 to 3 seconds.      Coloration: Skin is not jaundiced or pale.   Neurological:      General: No focal deficit present.      Mental Status: He is alert and oriented to person, place, and time.   Psychiatric:         Mood and Affect: Mood normal.        Last Recorded Vitals  Blood pressure 112/56, pulse 73, temperature 36.8 °C (98.2 °F), temperature source Oral, resp. rate 16,  "height 1.676 m (5' 6\"), weight 67.1 kg (147 lb 14.9 oz), SpO2 100%.  Intake/Output last 3 Shifts:  I/O last 3 completed shifts:  In: 1845 (27.5 mL/kg) [P.O.:1845]  Out: - (0 mL/kg)   Weight: 67.1 kg     Relevant Results  No results found for this or any previous visit (from the past 24 hours).       Assessment/Plan   IMP:    Acute respiratory Failure with hypoxia  Severe Aortic Valve Stenosis  DHF, grade II dysfunction  PNA  JAYDEN on CKD3  Palliative Care     DNRCCA/DNI/No ICU  Capable, language barrier.   Daughter Ludmila is stated HPOA, with son in law Kishan as stated alternate.   Has LW and DNR at home.      1/15  Spoke to daughter and son in law at bedside. Plan for discharge to [facility] on Friday, hospice has been consulted for informational meeting, possible admission to hospice, hospice meeting scheduled 1/16 1230-1pm. Family has hired private caregiver to assist with transition to [facility].   Goals and plan established, no acute palliative needs, will sign off at this time.      1/14  Patient continues to improve. I spoke to Kishan today. He confirmed that he has a meeting with [facility] today at 3PM, they do have a suite available. We reviewed hospice choices, and family prefers hospice [facility] for additional support at facility. Order placed for hospice consult, care coordination to send via careport.     Plan to medically stablize, then discharge directly to [facility] with hospice for supportive care.     1/13  We reviewed cardiology discussion with family and patient earlier today. Family was able to repeat to me entire discussion accurately. They are aware of progressive nature of valve stenosis and we reviewed impact on quality of life and challenges moving forward including risk of repeated hospitalizations, falls, syncope, confusion, JAYDEN, acute respiratory failure and progressive fatigue. Family and patient are focusing on quality of life and we reviewed hospice services. At the same " time, we discussed patient safety as he lives alone in his home. Son has already reached out to [facility] to set up contracts and reserve a room. Ideally family would like to transition patient directly from hospital to [facility]. I have asked care coordination to discuss this plan with family. I have also asked care coordination to reach out to [facility] and confirm which hospice companies they prefer. I will then review the options with family and set up informational meeting.   Regarding code status, patient and family are in agreement that he does not want any life support type measures at this point. I placed a signed copy of DNRCCA/DNI/No ICU in chart.     I spent 25 minutes in the professional and overall care of this patient.      Larisa Paniagua, APRN-CNP

## 2025-01-16 NOTE — ASSESSMENT & PLAN NOTE
Chest x-ray cardiomegaly, moderately large left and small right pleural effusion as well as possible small infrahilar alveolar infiltrate on the right  On RA this morning  Lasix 40 mg IV twice daily on hold 2/2 mild JAYDEN  Echocardiogram yesterday showed ejection fraction of 50 to 55%, severe Aortic valve stenosis   Cardiology following, recommending TAVR, patient declined. Palliative care consult placed. No surgical intervention, DNRCCA/DNI.

## 2025-01-17 VITALS
HEART RATE: 76 BPM | OXYGEN SATURATION: 100 % | TEMPERATURE: 98.1 F | DIASTOLIC BLOOD PRESSURE: 74 MMHG | WEIGHT: 147.93 LBS | HEIGHT: 66 IN | RESPIRATION RATE: 17 BRPM | BODY MASS INDEX: 23.77 KG/M2 | SYSTOLIC BLOOD PRESSURE: 124 MMHG

## 2025-01-17 PROCEDURE — 99239 HOSP IP/OBS DSCHRG MGMT >30: CPT | Performed by: NURSE PRACTITIONER

## 2025-01-17 PROCEDURE — 2500000004 HC RX 250 GENERAL PHARMACY W/ HCPCS (ALT 636 FOR OP/ED): Performed by: STUDENT IN AN ORGANIZED HEALTH CARE EDUCATION/TRAINING PROGRAM

## 2025-01-17 PROCEDURE — 2500000001 HC RX 250 WO HCPCS SELF ADMINISTERED DRUGS (ALT 637 FOR MEDICARE OP): Performed by: PHYSICIAN ASSISTANT

## 2025-01-17 PROCEDURE — 2500000001 HC RX 250 WO HCPCS SELF ADMINISTERED DRUGS (ALT 637 FOR MEDICARE OP): Performed by: NURSE PRACTITIONER

## 2025-01-17 PROCEDURE — 2500000005 HC RX 250 GENERAL PHARMACY W/O HCPCS

## 2025-01-17 PROCEDURE — 2500000001 HC RX 250 WO HCPCS SELF ADMINISTERED DRUGS (ALT 637 FOR MEDICARE OP): Performed by: STUDENT IN AN ORGANIZED HEALTH CARE EDUCATION/TRAINING PROGRAM

## 2025-01-17 RX ORDER — AMMONIUM LACTATE 12 G/100G
LOTION TOPICAL 2 TIMES DAILY
Qty: 225 G | Refills: 1 | Status: SHIPPED | OUTPATIENT
Start: 2025-01-17

## 2025-01-17 RX ORDER — FUROSEMIDE 20 MG/1
20 TABLET ORAL DAILY
Qty: 30 TABLET | Refills: 1 | Status: SHIPPED | OUTPATIENT
Start: 2025-01-17

## 2025-01-17 RX ORDER — PANTOPRAZOLE SODIUM 40 MG/1
40 TABLET, DELAYED RELEASE ORAL
Qty: 30 TABLET | Refills: 1 | Status: SHIPPED | OUTPATIENT
Start: 2025-01-17

## 2025-01-17 RX ADMIN — METOPROLOL SUCCINATE 50 MG: 50 TABLET, EXTENDED RELEASE ORAL at 08:56

## 2025-01-17 RX ADMIN — PANTOPRAZOLE SODIUM 40 MG: 40 TABLET, DELAYED RELEASE ORAL at 06:35

## 2025-01-17 RX ADMIN — Medication 2 L/MIN: at 09:00

## 2025-01-17 RX ADMIN — ASPIRIN 81 MG: 81 TABLET, COATED ORAL at 08:56

## 2025-01-17 RX ADMIN — FUROSEMIDE 20 MG: 20 TABLET ORAL at 08:56

## 2025-01-17 RX ADMIN — Medication 2 L/MIN: at 00:17

## 2025-01-17 RX ADMIN — ENOXAPARIN SODIUM 30 MG: 30 INJECTION SUBCUTANEOUS at 08:57

## 2025-01-17 RX ADMIN — Medication: at 08:55

## 2025-01-17 SDOH — ECONOMIC STABILITY: FOOD INSECURITY: WITHIN THE PAST 12 MONTHS, THE FOOD YOU BOUGHT JUST DIDN'T LAST AND YOU DIDN'T HAVE MONEY TO GET MORE.: NEVER TRUE

## 2025-01-17 SDOH — SOCIAL STABILITY: SOCIAL NETWORK: HOW OFTEN DO YOU ATTEND MEETINGS OF THE CLUBS OR ORGANIZATIONS YOU BELONG TO?: NEVER

## 2025-01-17 SDOH — SOCIAL STABILITY: SOCIAL NETWORK: HOW OFTEN DO YOU ATTEND CHURCH OR RELIGIOUS SERVICES?: NEVER

## 2025-01-17 SDOH — ECONOMIC STABILITY: HOUSING INSECURITY: IN THE LAST 12 MONTHS, WAS THERE A TIME WHEN YOU WERE NOT ABLE TO PAY THE MORTGAGE OR RENT ON TIME?: NO

## 2025-01-17 SDOH — ECONOMIC STABILITY: INCOME INSECURITY: IN THE PAST 12 MONTHS HAS THE ELECTRIC, GAS, OIL, OR WATER COMPANY THREATENED TO SHUT OFF SERVICES IN YOUR HOME?: NO

## 2025-01-17 SDOH — ECONOMIC STABILITY: HOUSING INSECURITY: AT ANY TIME IN THE PAST 12 MONTHS, WERE YOU HOMELESS OR LIVING IN A SHELTER (INCLUDING NOW)?: NO

## 2025-01-17 SDOH — SOCIAL STABILITY: SOCIAL INSECURITY
WITHIN THE LAST YEAR, HAVE YOU BEEN KICKED, HIT, SLAPPED, OR OTHERWISE PHYSICALLY HURT BY YOUR PARTNER OR EX-PARTNER?: NO

## 2025-01-17 SDOH — SOCIAL STABILITY: SOCIAL INSECURITY: ARE YOU MARRIED, WIDOWED, DIVORCED, SEPARATED, NEVER MARRIED, OR LIVING WITH A PARTNER?: MARRIED

## 2025-01-17 SDOH — SOCIAL STABILITY: SOCIAL INSECURITY: WITHIN THE LAST YEAR, HAVE YOU BEEN AFRAID OF YOUR PARTNER OR EX-PARTNER?: NO

## 2025-01-17 SDOH — ECONOMIC STABILITY: FOOD INSECURITY: WITHIN THE PAST 12 MONTHS, YOU WORRIED THAT YOUR FOOD WOULD RUN OUT BEFORE YOU GOT THE MONEY TO BUY MORE.: NEVER TRUE

## 2025-01-17 SDOH — HEALTH STABILITY: MENTAL HEALTH
DO YOU FEEL STRESS - TENSE, RESTLESS, NERVOUS, OR ANXIOUS, OR UNABLE TO SLEEP AT NIGHT BECAUSE YOUR MIND IS TROUBLED ALL THE TIME - THESE DAYS?: NOT AT ALL

## 2025-01-17 SDOH — ECONOMIC STABILITY: HOUSING INSECURITY: IN THE PAST 12 MONTHS, HOW MANY TIMES HAVE YOU MOVED WHERE YOU WERE LIVING?: 1

## 2025-01-17 SDOH — SOCIAL STABILITY: SOCIAL INSECURITY: WITHIN THE LAST YEAR, HAVE YOU BEEN HUMILIATED OR EMOTIONALLY ABUSED IN OTHER WAYS BY YOUR PARTNER OR EX-PARTNER?: NO

## 2025-01-17 SDOH — SOCIAL STABILITY: SOCIAL INSECURITY
WITHIN THE LAST YEAR, HAVE YOU BEEN RAPED OR FORCED TO HAVE ANY KIND OF SEXUAL ACTIVITY BY YOUR PARTNER OR EX-PARTNER?: NO

## 2025-01-17 SDOH — HEALTH STABILITY: PHYSICAL HEALTH: ON AVERAGE, HOW MANY DAYS PER WEEK DO YOU ENGAGE IN MODERATE TO STRENUOUS EXERCISE (LIKE A BRISK WALK)?: 0 DAYS

## 2025-01-17 SDOH — SOCIAL STABILITY: SOCIAL NETWORK
DO YOU BELONG TO ANY CLUBS OR ORGANIZATIONS SUCH AS CHURCH GROUPS, UNIONS, FRATERNAL OR ATHLETIC GROUPS, OR SCHOOL GROUPS?: NO

## 2025-01-17 SDOH — ECONOMIC STABILITY: FOOD INSECURITY: HOW HARD IS IT FOR YOU TO PAY FOR THE VERY BASICS LIKE FOOD, HOUSING, MEDICAL CARE, AND HEATING?: NOT HARD AT ALL

## 2025-01-17 SDOH — HEALTH STABILITY: PHYSICAL HEALTH: ON AVERAGE, HOW MANY MINUTES DO YOU ENGAGE IN EXERCISE AT THIS LEVEL?: 0 MIN

## 2025-01-17 SDOH — SOCIAL STABILITY: SOCIAL NETWORK: HOW OFTEN DO YOU GET TOGETHER WITH FRIENDS OR RELATIVES?: NEVER

## 2025-01-17 SDOH — ECONOMIC STABILITY: TRANSPORTATION INSECURITY: IN THE PAST 12 MONTHS, HAS LACK OF TRANSPORTATION KEPT YOU FROM MEDICAL APPOINTMENTS OR FROM GETTING MEDICATIONS?: NO

## 2025-01-17 SDOH — SOCIAL STABILITY: SOCIAL NETWORK: IN A TYPICAL WEEK, HOW MANY TIMES DO YOU TALK ON THE PHONE WITH FAMILY, FRIENDS, OR NEIGHBORS?: NEVER

## 2025-01-17 ASSESSMENT — PAIN SCALES - PAIN ASSESSMENT IN ADVANCED DEMENTIA (PAINAD)
BREATHING: NORMAL
FACIALEXPRESSION: SMILING OR INEXPRESSIVE
CONSOLABILITY: NO NEED TO CONSOLE
TOTALSCORE: 0
BODYLANGUAGE: RELAXED

## 2025-01-17 ASSESSMENT — PAIN SCALES - WONG BAKER
WONGBAKER_NUMERICALRESPONSE: NO HURT
WONGBAKER_NUMERICALRESPONSE: NO HURT

## 2025-01-17 ASSESSMENT — COGNITIVE AND FUNCTIONAL STATUS - GENERAL
CLIMB 3 TO 5 STEPS WITH RAILING: A LOT
MOBILITY SCORE: 21
CLIMB 3 TO 5 STEPS WITH RAILING: A LOT
MOBILITY SCORE: 21
WALKING IN HOSPITAL ROOM: A LITTLE
WALKING IN HOSPITAL ROOM: A LITTLE
DAILY ACTIVITIY SCORE: 24
DAILY ACTIVITIY SCORE: 24

## 2025-01-17 ASSESSMENT — PAIN SCALES - GENERAL
PAINLEVEL_OUTOF10: 0 - NO PAIN
PAINLEVEL_OUTOF10: 0 - NO PAIN

## 2025-01-17 ASSESSMENT — ACTIVITIES OF DAILY LIVING (ADL): LACK_OF_TRANSPORTATION: NO

## 2025-01-17 NOTE — PROGRESS NOTES
"Nutrition Follow up Note    Nutrition Assessment      Pt will be discharged today to hospice. Pt eating well.     Nutrition History:     Energy Intake: Good > 75 %  Food Allergies/Intolerances:  None    Anthropometrics:  Ht: 167.6 cm (5' 6\"), Wt: 67.1 kg (147 lb 14.9 oz), BMI: 23.89             Weight Change:  Daily Weight  01/10/25 : 67.1 kg (147 lb 14.9 oz)  12/09/24 : 67.1 kg (147 lb 14.9 oz)  11/21/24 : 68 kg (150 lb)  07/29/24 : 69.5 kg (153 lb 3.5 oz)  11/06/23 : 71.5 kg (157 lb 10.1 oz)  03/08/23 : 72.2 kg (159 lb 4 oz)  08/30/22 : 72.2 kg (159 lb 4 oz)  10/07/21 : 73.1 kg (161 lb 4 oz)  04/12/21 : 75.8 kg (167 lb)     Weight History / % Weight Change: family reported UBW to be 147#. Records show a nonsignificant 10# (6.4%) weight loss over 1 year.             Nutrition Focused Physical Exam Findings:                       Nutrition Significant Labs:  Lab Results   Component Value Date    WBC 4.9 01/13/2025    HGB 10.3 (L) 01/13/2025    HCT 31.2 (L) 01/13/2025     01/13/2025    CHOL 164 03/30/2023    TRIG 67 03/30/2023    HDL 59.3 03/30/2023    ALT 6 (L) 01/10/2025    AST 12 01/10/2025     01/13/2025    K 4.1 01/13/2025     01/13/2025    CREATININE 1.72 (H) 01/13/2025    BUN 39 (H) 01/13/2025    CO2 23 01/13/2025    TSH 5.32 (H) 03/04/2020    INR 1.0 01/10/2025    HGBA1C 5.0 11/29/2018     Nutrition Specific Medications:  ammonium lactate, , Topical, BID  aspirin, 81 mg, oral, Daily  enoxaparin, 30 mg, subcutaneous, Daily  furosemide, 20 mg, oral, Daily  metoprolol succinate XL, 50 mg, oral, Daily  nortriptyline, 10 mg, oral, Nightly  oxygen, , inhalation, Continuous - Inhalation  pantoprazole, 40 mg, oral, Daily before breakfast   Or  pantoprazole, 40 mg, intravenous, Daily before breakfast  polyethylene glycol, 17 g, oral, Daily  sennosides, 8.6 mg, oral, Nightly  sertraline, 50 mg, oral, Nightly        Dietary Orders (From admission, onward)       Start     Ordered    01/13/25 1143  " Adult diet Regular, Cardiac; 70 gm fat; 2 - 3 grams Sodium; 1600 mL fluid  Diet effective now        Question Answer Comment   Diet type Regular    Diet type Cardiac    Fat restriction: 70 gm fat    Sodium restriction: 2 - 3 grams Sodium    Dietary fluid restriction / 24h: 1600 mL fluid        01/13/25 1142    01/12/25 2113  May Participate in Room Service  ( ROOM SERVICE MAY PARTICIPATE)  Once        Question:  .  Answer:  Yes    01/12/25 2112                     Estimated Needs:   Estimated Energy Needs  Total Energy Estimated Needs (kCal): 1678 kCal  Total Estimated Energy Need per Day (kCal/kg): 30 kCal/kg  Method for Estimating Needs: actual wt    Estimated Protein Needs  Total Protein Estimated Needs (g): 81 g  Total Protein Estimated Needs (g/kg): 1.2 g/kg  Method for Estimating Needs: actual wt    Estimated Fluid Needs  Total Fluid Estimated Needs (mL): 1678 mL  Method for Estimating Needs: 1 mL/kcal        Nutrition Diagnosis   Nutrition Diagnosis:       Nutrition Diagnosis  Patient has Nutrition Diagnosis: Yes  Diagnosis Status (1): Ongoing  Nutrition Diagnosis 1: Increased nutrient needs  Related to (1): increased metabolic demand  As Evidenced by (1): CHF       Nutrition Interventions/Recommendations   Nutrition Interventions and Recommendations:    Nutrition Prescription:  Individualized Nutrition Prescription Provided for : 1678 kcals, 81 g protein via diet    Nutrition Interventions:   Food and/or Nutrient Delivery Interventions  Interventions: Meals and snacks  Meals and Snacks: Fat-modified diet, Mineral-modified diet  Goal: provide diet as ordered    Education Documentation  No documentation found.           Nutrition Monitoring and Evaluation   Monitoring/Evaluation:   Food/Nutrient Related History Monitoring  Monitoring and Evaluation Plan: Energy intake  Energy Intake: Estimated energy intake  Criteria: pt to consume >/= 75% estimated needs    Body Composition/Growth/Weight History  Monitoring  and Evaluation Plan: Weight  Weight: Measured weight  Criteria: pt will maintain wt at this time         Time Spent/Follow-up:   Follow Up  Time Spent (min): 30 minutes  Last Date of Nutrition Visit: 01/17/25  Nutrition Follow-Up Needed?: 7-10 days  Follow up Comment: 1/24/25

## 2025-01-17 NOTE — CARE PLAN
The clinical goals for the shift include Patient remain HDS with no falls throughout shift      Problem: Pain - Adult  Goal: Verbalizes/displays adequate comfort level or baseline comfort level  Outcome: Progressing     Problem: Safety - Adult  Goal: Free from fall injury  Outcome: Progressing     Problem: Discharge Planning  Goal: Discharge to home or other facility with appropriate resources  Outcome: Progressing     Problem: Fall/Injury  Goal: Not fall by end of shift  Outcome: Progressing  Goal: Be free from injury by end of the shift  Outcome: Progressing     
  The clinical goals for the shift include pt will remain HDS      Problem: Pain - Adult  Goal: Verbalizes/displays adequate comfort level or baseline comfort level  Outcome: Progressing     Problem: Safety - Adult  Goal: Free from fall injury  Outcome: Progressing     Problem: Discharge Planning  Goal: Discharge to home or other facility with appropriate resources  Outcome: Progressing     Problem: Chronic Conditions and Co-morbidities  Goal: Patient's chronic conditions and co-morbidity symptoms are monitored and maintained or improved  Outcome: Progressing     Problem: Fall/Injury  Goal: Not fall by end of shift  Outcome: Progressing  Goal: Be free from injury by end of the shift  Outcome: Progressing     
  The clinical goals for the shift include pt will remain safe and free from injury, wean O2      Problem: Pain - Adult  Goal: Verbalizes/displays adequate comfort level or baseline comfort level  Outcome: Progressing     Problem: Safety - Adult  Goal: Free from fall injury  Outcome: Progressing     Problem: Discharge Planning  Goal: Discharge to home or other facility with appropriate resources  Outcome: Progressing     Problem: Chronic Conditions and Co-morbidities  Goal: Patient's chronic conditions and co-morbidity symptoms are monitored and maintained or improved  Outcome: Progressing     Problem: Fall/Injury  Goal: Not fall by end of shift  Outcome: Progressing  Goal: Be free from injury by end of the shift  Outcome: Progressing     
The patient's goals for the shift include      The clinical goals for the shift include monitor fall risk    Problem: Pain - Adult  Goal: Verbalizes/displays adequate comfort level or baseline comfort level  Outcome: Progressing     Problem: Safety - Adult  Goal: Free from fall injury  Outcome: Progressing     Problem: Discharge Planning  Goal: Discharge to home or other facility with appropriate resources  Outcome: Progressing     Problem: Chronic Conditions and Co-morbidities  Goal: Patient's chronic conditions and co-morbidity symptoms are monitored and maintained or improved  Outcome: Progressing     Problem: Fall/Injury  Goal: Not fall by end of shift  Outcome: Progressing  Goal: Be free from injury by end of the shift  Outcome: Progressing     Problem: Nutrition  Goal: Oral intake greater 75%  Outcome: Progressing  Goal: Maintain stable weight  Outcome: Progressing     
The patient's goals for the shift include      The clinical goals for the shift include patient remain safe without falls throughout shift    Problem: Pain - Adult  Goal: Verbalizes/displays adequate comfort level or baseline comfort level  Outcome: Progressing     Problem: Safety - Adult  Goal: Free from fall injury  Outcome: Progressing     Problem: Discharge Planning  Goal: Discharge to home or other facility with appropriate resources  Outcome: Progressing     Problem: Chronic Conditions and Co-morbidities  Goal: Patient's chronic conditions and co-morbidity symptoms are monitored and maintained or improved  Outcome: Progressing     Problem: Fall/Injury  Goal: Not fall by end of shift  Outcome: Progressing  Goal: Be free from injury by end of the shift  Outcome: Progressing     Problem: Nutrition  Goal: Oral intake greater 75%  Outcome: Progressing  Goal: Maintain stable weight  Outcome: Progressing     
The patient's goals for the shift include      The clinical goals for the shift include pt will remain HDS      
The patient's goals for the shift include      The clinical goals for the shift include pt will remain safe and free from injury, wean O2        
The patient's goals for the shift include      The clinical goals for the shift include safety    Over the shift, the patient did not make progress toward the following goals.         Problem: Pain - Adult  Goal: Verbalizes/displays adequate comfort level or baseline comfort level  Outcome: Progressing     Problem: Safety - Adult  Goal: Free from fall injury  Outcome: Progressing     Problem: Discharge Planning  Goal: Discharge to home or other facility with appropriate resources  Outcome: Progressing     Problem: Chronic Conditions and Co-morbidities  Goal: Patient's chronic conditions and co-morbidity symptoms are monitored and maintained or improved  Outcome: Progressing     Problem: Fall/Injury  Goal: Not fall by end of shift  Outcome: Progressing  Goal: Be free from injury by end of the shift  Outcome: Progressing     Problem: Nutrition  Goal: Oral intake greater 75%  Outcome: Progressing  Goal: Maintain stable weight  Outcome: Progressing     Problem: Heart Failure  Goal: Improved gas exchange this shift  Outcome: Progressing  Goal: Improved urinary output this shift  Outcome: Progressing  Goal: Reduction in peripheral edema within 24 hours  Outcome: Progressing  Goal: Report improvement of dyspnea/breathlessness this shift  Outcome: Progressing  Goal: Weight from fluid excess reduced over 2-3 days, then stabilize  Outcome: Progressing  Goal: Increase self care and/or family involvement in 24 hours  Outcome: Progressing     
The patient's goals for the shift include      The clinical goals for the shift include safey        
The patient's goals for the shift include      The clinical goals for the shift include safey    Over the shift, the patient did not make progress toward the following goals.         Problem: Pain - Adult  Goal: Verbalizes/displays adequate comfort level or baseline comfort level  Outcome: Progressing     Problem: Safety - Adult  Goal: Free from fall injury  Outcome: Progressing     Problem: Discharge Planning  Goal: Discharge to home or other facility with appropriate resources  Outcome: Progressing     Problem: Chronic Conditions and Co-morbidities  Goal: Patient's chronic conditions and co-morbidity symptoms are monitored and maintained or improved  Outcome: Progressing     Problem: Fall/Injury  Goal: Not fall by end of shift  Outcome: Progressing  Goal: Be free from injury by end of the shift  Outcome: Progressing     Problem: Nutrition  Goal: Oral intake greater 75%  Outcome: Progressing  Goal: Maintain stable weight  Outcome: Progressing     Problem: Heart Failure  Goal: Improved gas exchange this shift  Outcome: Progressing  Goal: Improved urinary output this shift  Outcome: Progressing  Goal: Reduction in peripheral edema within 24 hours  Outcome: Progressing  Goal: Report improvement of dyspnea/breathlessness this shift  Outcome: Progressing  Goal: Weight from fluid excess reduced over 2-3 days, then stabilize  Outcome: Progressing  Goal: Increase self care and/or family involvement in 24 hours  Outcome: Progressing     
The patient's goals for the shift include  rest    The clinical goals for the shift include safety        
electronic

## 2025-01-17 NOTE — DISCHARGE SUMMARY
"Discharge Diagnosis  Acute on chronic diastolic heart failure    Issues Requiring Follow-Up  Hospice    Discharge Meds     Medication List      START taking these medications     acetaminophen 325 mg tablet; Commonly known as: Tylenol; Take 2 tablets   (650 mg) by mouth every 4 hours if needed for moderate pain (4 - 6).   ammonium lactate 12 % lotion; Commonly known as: Lac-Hydrin; Apply   topically 2 times a day.   furosemide 20 mg tablet; Commonly known as: Lasix; Take 1 tablet (20 mg)   by mouth once daily.   pantoprazole 40 mg EC tablet; Commonly known as: ProtoNix; Take 1 tablet   (40 mg) by mouth once daily in the morning. Take before meals. Do not   crush, chew, or split.     CONTINUE taking these medications     aspirin 81 mg EC tablet   cholecalciferol 125 mcg (5000 UT) capsule; Commonly known as: Vitamin   D-3   docusate sodium 100 mg capsule; Commonly known as: Colace   metoprolol succinate XL 50 mg 24 hr tablet; Commonly known as:   Toprol-XL; TAKE 1 TABLET BY MOUTH EVERY DAY   nortriptyline 10 mg capsule; Commonly known as: Pamelor; TAKE 1 CAPSULE   BY MOUTH AT BEDTIME   sennosides 8.6 mg tablet; Commonly known as: Senokot   sertraline 50 mg tablet; Commonly known as: Zoloft; Take 1 tablet (50   mg) by mouth once daily at bedtime.     STOP taking these medications     amLODIPine 2.5 mg tablet; Commonly known as: Norvasc   bacitracin 500 unit/gram ointment   lisinopril 20 mg tablet   potassium chloride CR 20 mEq ER tablet; Commonly known as: Klor-Con M20       Test Results Pending At Discharge  Pending Labs       No current pending labs.            Hospital Course   This is a 89-year-old male with past medical history of HTN, HLD, CKD 3, AS, Kaposi's sarcoma and other comorbidities presented to the ED was found to saturating 82% on 4 L oxygen upon arrival.  Patient does not speak English well, he speaks [place] and [place].  Per EMS his shortness of breath started around 6 PM he had mentioned \" he is " "going to die\" a couple times today.  He also was anxious at that time.  Patient denies chest pain, abdominal pain, fever or chills.     In the ED on admission vitals notable for respiratory rate 31, /73, saturating 100% on BiPAP.  After 2 hours of BiPAP patient was taken down to 4 L and stable at 97%.  He was also given Rocephin and azithromycin in the ED, aspirin 324, Lasix 40 IV.     Labs notable for glucose 150, sodium 134, bicarb 20, BUN 25, CR 1.52, EGFR 42, BNP 3620, troponin 170->195->255.  CBC notable for WBC 12.1, hemoglobin 11.8, HCT 35.8,    Patient was seen and evaluated by cardiology, patient with severe aortic valve stenosis needing TAVR, patient and family declined and opted for Hospice. Patient will discharge to Assisted living with hospice.     Case discussed with SW    DNR CCA/DNI    I spent over 35 minutes in professional and overall care of this patient.     Pertinent Physical Exam At Time of Discharge  Physical Exam  Vitals reviewed.   Constitutional:       Appearance: He is ill-appearing.   HENT:      Head: Normocephalic.      Nose: Nose normal.   Eyes:      Extraocular Movements: Extraocular movements intact.   Cardiovascular:      Rate and Rhythm: Rhythm irregular.      Heart sounds: Murmur heard.   Pulmonary:      Effort: Pulmonary effort is normal.   Abdominal:      General: Bowel sounds are normal.   Musculoskeletal:         General: Normal range of motion.      Cervical back: Neck supple.   Skin:     General: Skin is warm.   Neurological:      Mental Status: He is alert. Mental status is at baseline.         Outpatient Follow-Up  No future appointments.      Samir Pierre, BLAKE-ROBERT  "

## 2025-01-17 NOTE — PROGRESS NOTES
RN Hospice Note    Mr Cano is a Hospice Patient.   Hospice terminal diagnosis: CHF  Physician: Dr Mehdi Bravo  Visit type: DC visit     Comments/recommendations: All discharge plans are in place for pt to DC today at 11am to [facility].  I called the [facility] several times and had to leave a message regarding pt being DC to them today however, they were notified yesterday of this plan.      Discharge Planning:  Patient to be discharged to [facility]     The following is to be completed:  Discharge order: done  State DNR signed by MD: done  Nursing facility referral/transfer form: na  Medication reconciliation: done  PAS/RR or convalescent stay form:  na  Prescriptions for al narcotics/new medications: done  [facility]  Other: please call with time of death.   [phone]     Plan of care reviewed with patient/ family members   Plan of care reviewed with hospital staff members: ABRAN Weems CM       Please notify [facility] of any changes in condition. Thank you.  Office:  [phone] (8 am-6:30 pm M-F and 8 am-4:30 pm weekends and holidays)              [phone] (6:30 pm-8 am M-F and 4:30 pm-8 am weekends and holidays)     Angelic Aguila RN

## 2025-01-24 NOTE — DOCUMENTATION CLARIFICATION NOTE
"    PATIENT:               JEANNINE GARDNER  ACCT #:                  [id number]  MRN:                       [id number]  :                       1929  ADMIT DATE:       1/10/2025 7:16 PM  DISCH DATE:        2025 12:29 PM  RESPONDING PROVIDER #:        [id number]          PROVIDER RESPONSE TEXT:    Type II MI demand ischemia    CDI QUERY TEXT:    Clarification        Instruction:    Based on your assessment of the patient and the clinical information, please provide the requested documentation by clicking on the appropriate radio button and enter any additional information if prompted.    Question: Please further clarify the diagnosis of Type II MI as    When answering this query, please exercise your independent professional judgment. The fact that a question is being asked, does not imply that any particular answer is desired or expected.    The patient's clinical indicators include:  Clinical Information: 89 y.o. male presenting with shortness of breath.    Documented Diagnosis: Type II MI. 01/10: HP: \"Likely type II MI demand ischemia secondary to above\"    Clinical Indicators and Documentation:  -Vital Signs: 01/10: 36.7, 78, 28, 148/73, 100% pox on cpap  -Troponin trend: 170, 195, 255, 263  -EKG findings: Initial EKG NSR, LBBB  Repeat EKG NSR, nonspecific ST and T wave abnormality    -ECHO findings: : Cardiology: \"Echo from yesterday reveals a ejection fraction of 50-55%, grade 2 pseudonormal pattern of left ventricular diastolic filling with elevated left atrial pressure, moderately thickened mitral valve, severe aortic valve stenosis, moderate aortic regurgitation, aortic stenosis mean gradient 51 mm Hg, peak gradient 84 mm Hg, and dimensionless index 0.15.\"    -Physical Exam or Documented/Presenting symptoms:  -Cardiac Interventions: cardiology consult, 2D echo, repeat EKG  -Cardiac Consult:    Treatment: Cardiology consult, monitor lab values, 2D echo    Risk Factors: Severe aortic valve stenosis, " CHF, HTN, CKD, Pna, Acute respiratory failure  Options provided:  -- Type II MI, Please specify additional information below  -- Acute Myocardial Injury  -- Chronic Myocardial Injury  -- Other - I will add my own diagnosis  -- Refer to Clinical Documentation Reviewer    Query created by: Ace Desai on 1/24/2025 8:50 AM      Electronically signed by:  JURGEN WILLSON 1/24/2025 9:53 AM

## 2025-02-04 ENCOUNTER — APPOINTMENT (OUTPATIENT)
Dept: PRIMARY CARE | Facility: CLINIC | Age: OVER 89
End: 2025-02-04
Payer: MEDICARE

## 2025-03-07 ENCOUNTER — LAB REQUISITION (OUTPATIENT)
Dept: LAB | Facility: HOSPITAL | Age: OVER 89
End: 2025-03-07
Payer: MEDICARE

## 2025-03-07 LAB
APPEARANCE UR: CLEAR
BILIRUB UR STRIP.AUTO-MCNC: NEGATIVE MG/DL
CAOX CRY #/AREA UR COMP ASSIST: ABNORMAL /HPF
COLOR UR: YELLOW
GLUCOSE UR STRIP.AUTO-MCNC: NORMAL MG/DL
KETONES UR STRIP.AUTO-MCNC: NEGATIVE MG/DL
LEUKOCYTE ESTERASE UR QL STRIP.AUTO: NEGATIVE
MUCOUS THREADS #/AREA URNS AUTO: ABNORMAL /LPF
NITRITE UR QL STRIP.AUTO: NEGATIVE
PH UR STRIP.AUTO: 5 [PH]
PROT UR STRIP.AUTO-MCNC: NORMAL MG/DL
RBC # UR STRIP.AUTO: NEGATIVE MG/DL
RBC #/AREA URNS AUTO: ABNORMAL /HPF
SP GR UR STRIP.AUTO: 1.02
UROBILINOGEN UR STRIP.AUTO-MCNC: NORMAL MG/DL
WBC #/AREA URNS AUTO: ABNORMAL /HPF

## 2025-03-07 PROCEDURE — 81001 URINALYSIS AUTO W/SCOPE: CPT | Mod: OUT | Performed by: INTERNAL MEDICINE

## 2025-03-08 LAB — HOLD SPECIMEN: NORMAL

## 2025-03-11 ENCOUNTER — LAB REQUISITION (OUTPATIENT)
Dept: LAB | Facility: HOSPITAL | Age: OVER 89
End: 2025-03-11
Payer: MEDICARE

## 2025-03-11 DIAGNOSIS — I35.0 NONRHEUMATIC AORTIC (VALVE) STENOSIS: ICD-10-CM

## 2025-03-11 LAB
APPEARANCE UR: ABNORMAL
BILIRUB UR STRIP.AUTO-MCNC: NEGATIVE MG/DL
COLOR UR: YELLOW
GLUCOSE UR STRIP.AUTO-MCNC: NORMAL MG/DL
KETONES UR STRIP.AUTO-MCNC: NEGATIVE MG/DL
LEUKOCYTE ESTERASE UR QL STRIP.AUTO: NEGATIVE
NITRITE UR QL STRIP.AUTO: NEGATIVE
PH UR STRIP.AUTO: 5 [PH]
PROT UR STRIP.AUTO-MCNC: ABNORMAL MG/DL
RBC # UR STRIP.AUTO: NEGATIVE MG/DL
RBC #/AREA URNS AUTO: NORMAL /HPF
SP GR UR STRIP.AUTO: 1.02
UROBILINOGEN UR STRIP.AUTO-MCNC: ABNORMAL MG/DL
WBC #/AREA URNS AUTO: NORMAL /HPF

## 2025-03-11 PROCEDURE — 81001 URINALYSIS AUTO W/SCOPE: CPT | Mod: OUT | Performed by: INTERNAL MEDICINE

## 2025-03-12 LAB — HOLD SPECIMEN: NORMAL

## 2025-05-28 ENCOUNTER — APPOINTMENT (OUTPATIENT)
Dept: PRIMARY CARE | Facility: CLINIC | Age: OVER 89
End: 2025-05-28
Payer: MEDICARE